# Patient Record
Sex: FEMALE | Race: BLACK OR AFRICAN AMERICAN | NOT HISPANIC OR LATINO
[De-identification: names, ages, dates, MRNs, and addresses within clinical notes are randomized per-mention and may not be internally consistent; named-entity substitution may affect disease eponyms.]

---

## 2017-01-19 ENCOUNTER — APPOINTMENT (OUTPATIENT)
Dept: GASTROENTEROLOGY | Facility: HOSPITAL | Age: 72
End: 2017-01-19

## 2017-01-24 ENCOUNTER — RESULT REVIEW (OUTPATIENT)
Age: 72
End: 2017-01-24

## 2017-02-16 ENCOUNTER — APPOINTMENT (OUTPATIENT)
Dept: GASTROENTEROLOGY | Facility: HOSPITAL | Age: 72
End: 2017-02-16

## 2018-04-20 ENCOUNTER — INPATIENT (INPATIENT)
Facility: HOSPITAL | Age: 73
LOS: 2 days | Discharge: HOME | DRG: 389 | End: 2018-04-23
Attending: SURGERY | Admitting: SURGERY
Payer: COMMERCIAL

## 2018-04-20 VITALS
RESPIRATION RATE: 18 BRPM | DIASTOLIC BLOOD PRESSURE: 69 MMHG | OXYGEN SATURATION: 96 % | HEIGHT: 62 IN | HEART RATE: 99 BPM | TEMPERATURE: 99 F | SYSTOLIC BLOOD PRESSURE: 110 MMHG | WEIGHT: 153 LBS

## 2018-04-20 DIAGNOSIS — K56.609 UNSPECIFIED INTESTINAL OBSTRUCTION, UNSPECIFIED AS TO PARTIAL VERSUS COMPLETE OBSTRUCTION: ICD-10-CM

## 2018-04-20 LAB
ALBUMIN SERPL ELPH-MCNC: 3.9 G/DL — SIGNIFICANT CHANGE UP (ref 3.5–5)
ALP SERPL-CCNC: 86 U/L — SIGNIFICANT CHANGE UP (ref 40–120)
ALT FLD-CCNC: 24 U/L DA — SIGNIFICANT CHANGE UP (ref 10–60)
ANION GAP SERPL CALC-SCNC: 10 MMOL/L — SIGNIFICANT CHANGE UP (ref 5–17)
APPEARANCE UR: CLEAR — SIGNIFICANT CHANGE UP
APTT BLD: 30.1 SEC — SIGNIFICANT CHANGE UP (ref 27.5–37.4)
AST SERPL-CCNC: 32 U/L — SIGNIFICANT CHANGE UP (ref 10–40)
BACTERIA # UR AUTO: ABNORMAL /HPF
BASOPHILS # BLD AUTO: 0.1 K/UL — SIGNIFICANT CHANGE UP (ref 0–0.2)
BASOPHILS NFR BLD AUTO: 1.9 % — SIGNIFICANT CHANGE UP (ref 0–2)
BILIRUB SERPL-MCNC: 0.6 MG/DL — SIGNIFICANT CHANGE UP (ref 0.2–1.2)
BILIRUB UR-MCNC: NEGATIVE — SIGNIFICANT CHANGE UP
BUN SERPL-MCNC: 37 MG/DL — HIGH (ref 7–18)
CALCIUM SERPL-MCNC: 8.9 MG/DL — SIGNIFICANT CHANGE UP (ref 8.4–10.5)
CHLORIDE SERPL-SCNC: 98 MMOL/L — SIGNIFICANT CHANGE UP (ref 96–108)
CK MB BLD-MCNC: 0.7 % — SIGNIFICANT CHANGE UP (ref 0–3.5)
CK MB CFR SERPL CALC: 1.7 NG/ML — SIGNIFICANT CHANGE UP (ref 0–3.6)
CK SERPL-CCNC: 253 U/L — HIGH (ref 21–215)
CO2 SERPL-SCNC: 29 MMOL/L — SIGNIFICANT CHANGE UP (ref 22–31)
COLOR SPEC: YELLOW — SIGNIFICANT CHANGE UP
CREAT SERPL-MCNC: 2.71 MG/DL — HIGH (ref 0.5–1.3)
DIFF PNL FLD: NEGATIVE — SIGNIFICANT CHANGE UP
EOSINOPHIL # BLD AUTO: 0.1 K/UL — SIGNIFICANT CHANGE UP (ref 0–0.5)
EOSINOPHIL NFR BLD AUTO: 0.9 % — SIGNIFICANT CHANGE UP (ref 0–6)
EPI CELLS # UR: SIGNIFICANT CHANGE UP /HPF
GLUCOSE SERPL-MCNC: 108 MG/DL — HIGH (ref 70–99)
GLUCOSE UR QL: NEGATIVE — SIGNIFICANT CHANGE UP
HCT VFR BLD CALC: 40.5 % — SIGNIFICANT CHANGE UP (ref 34.5–45)
HGB BLD-MCNC: 13.1 G/DL — SIGNIFICANT CHANGE UP (ref 11.5–15.5)
INR BLD: 1.05 RATIO — SIGNIFICANT CHANGE UP (ref 0.88–1.16)
KETONES UR-MCNC: ABNORMAL
LACTATE SERPL-SCNC: 0.7 MMOL/L — SIGNIFICANT CHANGE UP (ref 0.7–2)
LEUKOCYTE ESTERASE UR-ACNC: ABNORMAL
LIDOCAIN IGE QN: 177 U/L — SIGNIFICANT CHANGE UP (ref 73–393)
LYMPHOCYTES # BLD AUTO: 1.5 K/UL — SIGNIFICANT CHANGE UP (ref 1–3.3)
LYMPHOCYTES # BLD AUTO: 22.5 % — SIGNIFICANT CHANGE UP (ref 13–44)
MAGNESIUM SERPL-MCNC: 2.5 MG/DL — SIGNIFICANT CHANGE UP (ref 1.6–2.6)
MCHC RBC-ENTMCNC: 30.2 PG — SIGNIFICANT CHANGE UP (ref 27–34)
MCHC RBC-ENTMCNC: 32.4 GM/DL — SIGNIFICANT CHANGE UP (ref 32–36)
MCV RBC AUTO: 93.2 FL — SIGNIFICANT CHANGE UP (ref 80–100)
MONOCYTES # BLD AUTO: 1 K/UL — HIGH (ref 0–0.9)
MONOCYTES NFR BLD AUTO: 14.2 % — HIGH (ref 2–14)
NEUTROPHILS # BLD AUTO: 4.1 K/UL — SIGNIFICANT CHANGE UP (ref 1.8–7.4)
NEUTROPHILS NFR BLD AUTO: 60.6 % — SIGNIFICANT CHANGE UP (ref 43–77)
NITRITE UR-MCNC: NEGATIVE — SIGNIFICANT CHANGE UP
PH UR: 6 — SIGNIFICANT CHANGE UP (ref 5–8)
PLATELET # BLD AUTO: 254 K/UL — SIGNIFICANT CHANGE UP (ref 150–400)
POTASSIUM SERPL-MCNC: 4.4 MMOL/L — SIGNIFICANT CHANGE UP (ref 3.5–5.3)
POTASSIUM SERPL-SCNC: 4.4 MMOL/L — SIGNIFICANT CHANGE UP (ref 3.5–5.3)
PROT SERPL-MCNC: 8.5 G/DL — HIGH (ref 6–8.3)
PROT UR-MCNC: 15
PROTHROM AB SERPL-ACNC: 11.5 SEC — SIGNIFICANT CHANGE UP (ref 9.8–12.7)
RBC # BLD: 4.34 M/UL — SIGNIFICANT CHANGE UP (ref 3.8–5.2)
RBC # FLD: 13.4 % — SIGNIFICANT CHANGE UP (ref 10.3–14.5)
RBC CASTS # UR COMP ASSIST: SIGNIFICANT CHANGE UP /HPF (ref 0–2)
SODIUM SERPL-SCNC: 137 MMOL/L — SIGNIFICANT CHANGE UP (ref 135–145)
SP GR SPEC: 1.01 — SIGNIFICANT CHANGE UP (ref 1.01–1.02)
TROPONIN I SERPL-MCNC: <0.015 NG/ML — SIGNIFICANT CHANGE UP (ref 0–0.04)
UROBILINOGEN FLD QL: NEGATIVE — SIGNIFICANT CHANGE UP
WBC # BLD: 6.8 K/UL — SIGNIFICANT CHANGE UP (ref 3.8–10.5)
WBC # FLD AUTO: 6.8 K/UL — SIGNIFICANT CHANGE UP (ref 3.8–10.5)
WBC UR QL: SIGNIFICANT CHANGE UP /HPF (ref 0–5)

## 2018-04-20 PROCEDURE — 99222 1ST HOSP IP/OBS MODERATE 55: CPT | Mod: AI

## 2018-04-20 PROCEDURE — 71045 X-RAY EXAM CHEST 1 VIEW: CPT | Mod: 26

## 2018-04-20 PROCEDURE — 99285 EMERGENCY DEPT VISIT HI MDM: CPT

## 2018-04-20 PROCEDURE — 74176 CT ABD & PELVIS W/O CONTRAST: CPT | Mod: 26

## 2018-04-20 RX ORDER — FAMOTIDINE 10 MG/ML
20 INJECTION INTRAVENOUS ONCE
Qty: 0 | Refills: 0 | Status: COMPLETED | OUTPATIENT
Start: 2018-04-20 | End: 2018-04-20

## 2018-04-20 RX ORDER — FAMOTIDINE 10 MG/ML
20 INJECTION INTRAVENOUS ONCE
Qty: 0 | Refills: 0 | Status: DISCONTINUED | OUTPATIENT
Start: 2018-04-20 | End: 2018-04-20

## 2018-04-20 RX ORDER — DILTIAZEM HCL 120 MG
180 CAPSULE, EXT RELEASE 24 HR ORAL DAILY
Qty: 0 | Refills: 0 | Status: DISCONTINUED | OUTPATIENT
Start: 2018-04-20 | End: 2018-04-20

## 2018-04-20 RX ORDER — PANTOPRAZOLE SODIUM 20 MG/1
40 TABLET, DELAYED RELEASE ORAL DAILY
Qty: 0 | Refills: 0 | Status: DISCONTINUED | OUTPATIENT
Start: 2018-04-20 | End: 2018-04-23

## 2018-04-20 RX ORDER — INSULIN LISPRO 100/ML
VIAL (ML) SUBCUTANEOUS
Qty: 0 | Refills: 0 | Status: DISCONTINUED | OUTPATIENT
Start: 2018-04-20 | End: 2018-04-23

## 2018-04-20 RX ORDER — GLUCAGON INJECTION, SOLUTION 0.5 MG/.1ML
1 INJECTION, SOLUTION SUBCUTANEOUS ONCE
Qty: 0 | Refills: 0 | Status: DISCONTINUED | OUTPATIENT
Start: 2018-04-20 | End: 2018-04-23

## 2018-04-20 RX ORDER — HEPARIN SODIUM 5000 [USP'U]/ML
5000 INJECTION INTRAVENOUS; SUBCUTANEOUS EVERY 8 HOURS
Qty: 0 | Refills: 0 | Status: DISCONTINUED | OUTPATIENT
Start: 2018-04-20 | End: 2018-04-23

## 2018-04-20 RX ORDER — METOCLOPRAMIDE HCL 10 MG
10 TABLET ORAL ONCE
Qty: 0 | Refills: 0 | Status: DISCONTINUED | OUTPATIENT
Start: 2018-04-20 | End: 2018-04-20

## 2018-04-20 RX ORDER — CLOPIDOGREL BISULFATE 75 MG/1
75 TABLET, FILM COATED ORAL DAILY
Qty: 0 | Refills: 0 | Status: DISCONTINUED | OUTPATIENT
Start: 2018-04-20 | End: 2018-04-23

## 2018-04-20 RX ORDER — DEXTROSE 50 % IN WATER 50 %
25 SYRINGE (ML) INTRAVENOUS ONCE
Qty: 0 | Refills: 0 | Status: DISCONTINUED | OUTPATIENT
Start: 2018-04-20 | End: 2018-04-23

## 2018-04-20 RX ORDER — DEXTROSE 50 % IN WATER 50 %
1 SYRINGE (ML) INTRAVENOUS ONCE
Qty: 0 | Refills: 0 | Status: DISCONTINUED | OUTPATIENT
Start: 2018-04-20 | End: 2018-04-23

## 2018-04-20 RX ORDER — SODIUM CHLORIDE 9 MG/ML
1000 INJECTION, SOLUTION INTRAVENOUS
Qty: 0 | Refills: 0 | Status: DISCONTINUED | OUTPATIENT
Start: 2018-04-20 | End: 2018-04-23

## 2018-04-20 RX ORDER — SODIUM CHLORIDE 9 MG/ML
1000 INJECTION INTRAMUSCULAR; INTRAVENOUS; SUBCUTANEOUS
Qty: 0 | Refills: 0 | Status: DISCONTINUED | OUTPATIENT
Start: 2018-04-20 | End: 2018-04-21

## 2018-04-20 RX ORDER — DEXTROSE 50 % IN WATER 50 %
12.5 SYRINGE (ML) INTRAVENOUS ONCE
Qty: 0 | Refills: 0 | Status: DISCONTINUED | OUTPATIENT
Start: 2018-04-20 | End: 2018-04-23

## 2018-04-20 RX ORDER — ATORVASTATIN CALCIUM 80 MG/1
80 TABLET, FILM COATED ORAL AT BEDTIME
Qty: 0 | Refills: 0 | Status: DISCONTINUED | OUTPATIENT
Start: 2018-04-20 | End: 2018-04-20

## 2018-04-20 RX ORDER — ASPIRIN/CALCIUM CARB/MAGNESIUM 324 MG
81 TABLET ORAL DAILY
Qty: 0 | Refills: 0 | Status: DISCONTINUED | OUTPATIENT
Start: 2018-04-20 | End: 2018-04-23

## 2018-04-20 RX ORDER — MORPHINE SULFATE 50 MG/1
4 CAPSULE, EXTENDED RELEASE ORAL ONCE
Qty: 0 | Refills: 0 | Status: DISCONTINUED | OUTPATIENT
Start: 2018-04-20 | End: 2018-04-20

## 2018-04-20 RX ORDER — ONDANSETRON 8 MG/1
4 TABLET, FILM COATED ORAL ONCE
Qty: 0 | Refills: 0 | Status: COMPLETED | OUTPATIENT
Start: 2018-04-20 | End: 2018-04-20

## 2018-04-20 RX ORDER — METOCLOPRAMIDE HCL 10 MG
10 TABLET ORAL ONCE
Qty: 0 | Refills: 0 | Status: COMPLETED | OUTPATIENT
Start: 2018-04-20 | End: 2018-04-20

## 2018-04-20 RX ORDER — FLUOXETINE HCL 10 MG
40 CAPSULE ORAL DAILY
Qty: 0 | Refills: 0 | Status: DISCONTINUED | OUTPATIENT
Start: 2018-04-20 | End: 2018-04-23

## 2018-04-20 RX ORDER — SODIUM CHLORIDE 9 MG/ML
3 INJECTION INTRAMUSCULAR; INTRAVENOUS; SUBCUTANEOUS ONCE
Qty: 0 | Refills: 0 | Status: COMPLETED | OUTPATIENT
Start: 2018-04-20 | End: 2018-04-20

## 2018-04-20 RX ORDER — TRAZODONE HCL 50 MG
50 TABLET ORAL DAILY
Qty: 0 | Refills: 0 | Status: DISCONTINUED | OUTPATIENT
Start: 2018-04-20 | End: 2018-04-23

## 2018-04-20 RX ADMIN — Medication 10 MILLIGRAM(S): at 15:39

## 2018-04-20 RX ADMIN — MORPHINE SULFATE 4 MILLIGRAM(S): 50 CAPSULE, EXTENDED RELEASE ORAL at 15:40

## 2018-04-20 RX ADMIN — SODIUM CHLORIDE 125 MILLILITER(S): 9 INJECTION INTRAMUSCULAR; INTRAVENOUS; SUBCUTANEOUS at 12:16

## 2018-04-20 RX ADMIN — SODIUM CHLORIDE 125 MILLILITER(S): 9 INJECTION INTRAMUSCULAR; INTRAVENOUS; SUBCUTANEOUS at 22:47

## 2018-04-20 RX ADMIN — MORPHINE SULFATE 4 MILLIGRAM(S): 50 CAPSULE, EXTENDED RELEASE ORAL at 16:56

## 2018-04-20 RX ADMIN — SODIUM CHLORIDE 3 MILLILITER(S): 9 INJECTION INTRAMUSCULAR; INTRAVENOUS; SUBCUTANEOUS at 12:17

## 2018-04-20 RX ADMIN — FAMOTIDINE 104 MILLIGRAM(S): 10 INJECTION INTRAVENOUS at 12:17

## 2018-04-20 RX ADMIN — ONDANSETRON 4 MILLIGRAM(S): 8 TABLET, FILM COATED ORAL at 12:17

## 2018-04-20 RX ADMIN — HEPARIN SODIUM 5000 UNIT(S): 5000 INJECTION INTRAVENOUS; SUBCUTANEOUS at 22:47

## 2018-04-20 NOTE — H&P ADULT - ASSESSMENT
psbo with transition point LLQ, but with stool in colon and recent BM.  dm  htn  pud        Admit to surgical floors  ivf  i&o's  ngt to lcws  f/u axr in am  f/u labs am  GI/ dvt prophylaxis

## 2018-04-20 NOTE — H&P ADULT - HISTORY OF PRESENT ILLNESS
· HPI Objective Statement: 73 y/o F pt w/ PMhx of renal insuffiencey, DM, MI, PUD, anemia, depression, GERD, OA, HLD, diabetic neuropathy, coronary stent, CAD and PSHx of cataract extraction, stented coronary artery, bowel obstruction, hip replacement c/o emesis, abd pain, decreased PO intake x 5 days.Pt rates pain as 8/10 when present, but improved presently.. Last bowel movement today.  Pt reports a bowel obstruction in the remote past caused by a metal object lodged in the intra-abdomen and caused her to unable to defecate or urinate. Denies bloody stools, fever. Allergic to penicillin (rash).	  · Presenting Symptoms: DECREASED EATING/DRINKING, NAUSEA, VOMITING, cough	  · Negative Findings: no blood in stool, no fever	  · Location: abdomen	  · Timing: sudden onset	  · Duration: day(s)  5 days	    PAST MEDICAL/SURGICAL/FAMILY/SOCIAL HISTORY:    Past Medical History:  Anemia  denies txn, diagnosed 1 year ago with GI w/u showing a stable peptic ulcer  CAD   Coronary Stent  x 2 (1998, 2011)  Depression   Diabetes Mellitus Type II  x 14 years  Diabetic Nephropathy  was told she had "one weak kidney on ultrasound"  Diabetic Neuropathy    GERD (Gastroesophageal Reflux Disease)    MI in 2011  HTN    Hypercholesterolemia    OA (Osteoarthritis) of Knee  bilateral  PUD  Renal insufficiency    Sleep apnea, unspecified type.     Past Surgical History:  Bowel obstruction  surgical intervention  S/P cataract extraction    S/P hip replacement  right  Stented coronary artery.

## 2018-04-20 NOTE — ED PROVIDER NOTE - MEDICAL DECISION MAKING DETAILS
Pt w/ emesis and diffuse abd pain. Concern w total SOB or partial SBO. Will get CT scan and labs. Reassess.

## 2018-04-20 NOTE — H&P ADULT - ATTENDING COMMENTS
Agree w above  SBO, with plan to manage nonoperatively  Abd softly distended, nontender following NG placement  Medicine consult for management of multiple medical issues

## 2018-04-20 NOTE — H&P ADULT - NSHPPHYSICALEXAM_GEN_ALL_CORE
A&Ox3 nad  Vital Signs Last 24 Hrs  T(C): 37.2 (20 Apr 2018 15:21), Max: 37.2 (20 Apr 2018 15:21)  T(F): 99 (20 Apr 2018 15:21), Max: 99 (20 Apr 2018 15:21)  HR: 102 (20 Apr 2018 15:30) (98 - 102)  BP: 110/61 (20 Apr 2018 15:30) (101/71 - 110/69)  BP(mean): --  RR: 20 (20 Apr 2018 15:30) (18 - 20)  SpO2: 99% (20 Apr 2018 15:21) (96% - 99%)    Abd; soft, lower abdominal tenderness with deep palpation L>R  voluntary guarding, no rebound tenderness. no distinct masses present. no hernias palpable. healed midline scar.  lungs: cta bilat  EXT: nop calf swellng or erythema    ngt with 550cc bilious material, sumping well.

## 2018-04-20 NOTE — ED PROVIDER NOTE - PSH
Bowel obstruction  surgical intervention  S/P cataract extraction    S/P hip replacement  right  Stented coronary artery

## 2018-04-20 NOTE — H&P ADULT - NSHPLABSRESULTS_GEN_ALL_CORE
13.1   6.8   )-----------( 254      ( 20 Apr 2018 12:19 )             40.5     04-20    137  |  98  |  37<H>  ----------------------------<  108<H>  4.4   |  29  |  2.71<H>    Ca    8.9      20 Apr 2018 12:19  Mg     2.5     04-20    TPro  8.5<H>  /  Alb  3.9  /  TBili  0.6  /  DBili  x   /  AST  32  /  ALT  24  /  AlkPhos  86  04-20    < from: CT Abdomen and Pelvis w/ Oral Cont (04.20.18 @ 15:17) >    Impression:  Mechanical distal small bowel obstruction as described. Consider serial   plain films to monitor progression of contrast.  Cholelithiasis.  Diverticulosis coli, no diverticulitis.  Additional findings as discussed.    < end of copied text >

## 2018-04-20 NOTE — ED PROVIDER NOTE - OBJECTIVE STATEMENT
73 y/o F pt w/ PMhx of renal insuffiencey, DM, MI, PUD, anemia, depression, GERD, OA, HLD, diabetic neuropathy, coronary stent, CAD and PSHx of cataract extraction, stented coronary artery, bowel obstruction, hip replacement c/o emesis, abd pain, decreased PO intake x 5 days.  Rates pain as 8/10. Last bowel movement today. Pt reports a bowel obstruction in the remote past caused by a metal object lodged in the intra-abdomen and caused her to unable to defecate or urinate. Denies bloody stools, fever. Also reports productive cough x 2 weeks. Allergic to penicillin (rash).

## 2018-04-20 NOTE — ED PROVIDER NOTE - GASTROINTESTINAL, MLM
Abdomen soft, no guarding. Normal bowel sounds. Diffuse tenderness w/ distension. No CVA tenderness.

## 2018-04-20 NOTE — ED PROVIDER NOTE - PMH
Anemia  denies txn, diagnosed 1 year ago with GI w/u showing a stable peptic ulcer  CAD (Coronary Artery Disease)    Coronary Stent  x 2 (1998, 2011)  Depression  denies hospitalization/ therapy  Diabetes Mellitus Type II  x 14 years  Diabetic Nephropathy  was told she had "one weak kidney on ultrasound"  Diabetic Neuropathy    GERD (Gastroesophageal Reflux Disease)    Heart Attack  7/2011  HTN (Hypertension)    Hypercholesterolemia    OA (Osteoarthritis) of Knee  bilateral  PUD (Peptic Ulcer Disease)  denies bleed/ txn  Renal insufficiency    Sleep apnea, unspecified type

## 2018-04-21 DIAGNOSIS — K27.9 PEPTIC ULCER, SITE UNSPECIFIED, UNSPECIFIED AS ACUTE OR CHRONIC, WITHOUT HEMORRHAGE OR PERFORATION: ICD-10-CM

## 2018-04-21 DIAGNOSIS — K56.609 UNSPECIFIED INTESTINAL OBSTRUCTION, UNSPECIFIED AS TO PARTIAL VERSUS COMPLETE OBSTRUCTION: ICD-10-CM

## 2018-04-21 DIAGNOSIS — I10 ESSENTIAL (PRIMARY) HYPERTENSION: ICD-10-CM

## 2018-04-21 DIAGNOSIS — D64.9 ANEMIA, UNSPECIFIED: ICD-10-CM

## 2018-04-21 DIAGNOSIS — N28.9 DISORDER OF KIDNEY AND URETER, UNSPECIFIED: ICD-10-CM

## 2018-04-21 DIAGNOSIS — E11.40 TYPE 2 DIABETES MELLITUS WITH DIABETIC NEUROPATHY, UNSPECIFIED: ICD-10-CM

## 2018-04-21 DIAGNOSIS — I25.10 ATHEROSCLEROTIC HEART DISEASE OF NATIVE CORONARY ARTERY WITHOUT ANGINA PECTORIS: ICD-10-CM

## 2018-04-21 DIAGNOSIS — Z29.9 ENCOUNTER FOR PROPHYLACTIC MEASURES, UNSPECIFIED: ICD-10-CM

## 2018-04-21 LAB
ALBUMIN SERPL ELPH-MCNC: 3.1 G/DL — LOW (ref 3.5–5)
ALP SERPL-CCNC: 68 U/L — SIGNIFICANT CHANGE UP (ref 40–120)
ALT FLD-CCNC: 19 U/L DA — SIGNIFICANT CHANGE UP (ref 10–60)
ANION GAP SERPL CALC-SCNC: 9 MMOL/L — SIGNIFICANT CHANGE UP (ref 5–17)
AST SERPL-CCNC: 34 U/L — SIGNIFICANT CHANGE UP (ref 10–40)
BILIRUB SERPL-MCNC: 0.6 MG/DL — SIGNIFICANT CHANGE UP (ref 0.2–1.2)
BUN SERPL-MCNC: 24 MG/DL — HIGH (ref 7–18)
CALCIUM SERPL-MCNC: 7.8 MG/DL — LOW (ref 8.4–10.5)
CHLORIDE SERPL-SCNC: 110 MMOL/L — HIGH (ref 96–108)
CO2 SERPL-SCNC: 23 MMOL/L — SIGNIFICANT CHANGE UP (ref 22–31)
CREAT SERPL-MCNC: 1.48 MG/DL — HIGH (ref 0.5–1.3)
GLUCOSE SERPL-MCNC: 53 MG/DL — LOW (ref 70–99)
HBA1C BLD-MCNC: 10.9 % — HIGH (ref 4–5.6)
HCT VFR BLD CALC: 32.9 % — LOW (ref 34.5–45)
HCT VFR BLD CALC: 35.2 % — SIGNIFICANT CHANGE UP (ref 34.5–45)
HGB BLD-MCNC: 10.3 G/DL — LOW (ref 11.5–15.5)
HGB BLD-MCNC: 10.8 G/DL — LOW (ref 11.5–15.5)
LACTATE SERPL-SCNC: 1.3 MMOL/L — SIGNIFICANT CHANGE UP (ref 0.7–2)
MCHC RBC-ENTMCNC: 29.1 PG — SIGNIFICANT CHANGE UP (ref 27–34)
MCHC RBC-ENTMCNC: 29.9 PG — SIGNIFICANT CHANGE UP (ref 27–34)
MCHC RBC-ENTMCNC: 30.7 GM/DL — LOW (ref 32–36)
MCHC RBC-ENTMCNC: 31.3 GM/DL — LOW (ref 32–36)
MCV RBC AUTO: 94.8 FL — SIGNIFICANT CHANGE UP (ref 80–100)
MCV RBC AUTO: 95.4 FL — SIGNIFICANT CHANGE UP (ref 80–100)
PLATELET # BLD AUTO: 212 K/UL — SIGNIFICANT CHANGE UP (ref 150–400)
PLATELET # BLD AUTO: 219 K/UL — SIGNIFICANT CHANGE UP (ref 150–400)
POTASSIUM SERPL-MCNC: 4.1 MMOL/L — SIGNIFICANT CHANGE UP (ref 3.5–5.3)
POTASSIUM SERPL-SCNC: 4.1 MMOL/L — SIGNIFICANT CHANGE UP (ref 3.5–5.3)
PROT SERPL-MCNC: 6.9 G/DL — SIGNIFICANT CHANGE UP (ref 6–8.3)
RBC # BLD: 3.45 M/UL — LOW (ref 3.8–5.2)
RBC # BLD: 3.71 M/UL — LOW (ref 3.8–5.2)
RBC # FLD: 13.7 % — SIGNIFICANT CHANGE UP (ref 10.3–14.5)
RBC # FLD: 14.1 % — SIGNIFICANT CHANGE UP (ref 10.3–14.5)
SODIUM SERPL-SCNC: 142 MMOL/L — SIGNIFICANT CHANGE UP (ref 135–145)
WBC # BLD: 5.2 K/UL — SIGNIFICANT CHANGE UP (ref 3.8–10.5)
WBC # BLD: 5.6 K/UL — SIGNIFICANT CHANGE UP (ref 3.8–10.5)
WBC # FLD AUTO: 5.2 K/UL — SIGNIFICANT CHANGE UP (ref 3.8–10.5)
WBC # FLD AUTO: 5.6 K/UL — SIGNIFICANT CHANGE UP (ref 3.8–10.5)

## 2018-04-21 PROCEDURE — 99231 SBSQ HOSP IP/OBS SF/LOW 25: CPT

## 2018-04-21 PROCEDURE — 74018 RADEX ABDOMEN 1 VIEW: CPT | Mod: 26

## 2018-04-21 RX ORDER — CIPROFLOXACIN LACTATE 400MG/40ML
VIAL (ML) INTRAVENOUS
Qty: 0 | Refills: 0 | Status: DISCONTINUED | OUTPATIENT
Start: 2018-04-22 | End: 2018-04-23

## 2018-04-21 RX ORDER — DEXTROSE 50 % IN WATER 50 %
25 SYRINGE (ML) INTRAVENOUS ONCE
Qty: 0 | Refills: 0 | Status: COMPLETED | OUTPATIENT
Start: 2018-04-21 | End: 2018-04-21

## 2018-04-21 RX ORDER — ACETAMINOPHEN 500 MG
1000 TABLET ORAL ONCE
Qty: 0 | Refills: 0 | Status: COMPLETED | OUTPATIENT
Start: 2018-04-21 | End: 2018-04-21

## 2018-04-21 RX ORDER — CIPROFLOXACIN LACTATE 400MG/40ML
400 VIAL (ML) INTRAVENOUS ONCE
Qty: 0 | Refills: 0 | Status: COMPLETED | OUTPATIENT
Start: 2018-04-21 | End: 2018-04-22

## 2018-04-21 RX ORDER — ACETAMINOPHEN 500 MG
650 TABLET ORAL EVERY 6 HOURS
Qty: 0 | Refills: 0 | Status: DISCONTINUED | OUTPATIENT
Start: 2018-04-21 | End: 2018-04-23

## 2018-04-21 RX ORDER — SODIUM CHLORIDE 9 MG/ML
1000 INJECTION, SOLUTION INTRAVENOUS
Qty: 0 | Refills: 0 | Status: COMPLETED | OUTPATIENT
Start: 2018-04-21 | End: 2018-04-21

## 2018-04-21 RX ORDER — SODIUM CHLORIDE 9 MG/ML
1000 INJECTION, SOLUTION INTRAVENOUS
Qty: 0 | Refills: 0 | Status: DISCONTINUED | OUTPATIENT
Start: 2018-04-21 | End: 2018-04-23

## 2018-04-21 RX ORDER — CIPROFLOXACIN LACTATE 400MG/40ML
400 VIAL (ML) INTRAVENOUS EVERY 12 HOURS
Qty: 0 | Refills: 0 | Status: DISCONTINUED | OUTPATIENT
Start: 2018-04-22 | End: 2018-04-23

## 2018-04-21 RX ADMIN — SODIUM CHLORIDE 50 MILLILITER(S): 9 INJECTION, SOLUTION INTRAVENOUS at 08:39

## 2018-04-21 RX ADMIN — PANTOPRAZOLE SODIUM 40 MILLIGRAM(S): 20 TABLET, DELAYED RELEASE ORAL at 12:44

## 2018-04-21 RX ADMIN — SODIUM CHLORIDE 100 MILLILITER(S): 9 INJECTION, SOLUTION INTRAVENOUS at 22:37

## 2018-04-21 RX ADMIN — Medication 25 GRAM(S): at 08:39

## 2018-04-21 RX ADMIN — Medication 15 MILLIGRAM(S): at 21:25

## 2018-04-21 RX ADMIN — SODIUM CHLORIDE 50 MILLILITER(S): 9 INJECTION, SOLUTION INTRAVENOUS at 10:26

## 2018-04-21 RX ADMIN — SODIUM CHLORIDE 125 MILLILITER(S): 9 INJECTION INTRAMUSCULAR; INTRAVENOUS; SUBCUTANEOUS at 06:03

## 2018-04-21 RX ADMIN — HEPARIN SODIUM 5000 UNIT(S): 5000 INJECTION INTRAVENOUS; SUBCUTANEOUS at 21:25

## 2018-04-21 RX ADMIN — HEPARIN SODIUM 5000 UNIT(S): 5000 INJECTION INTRAVENOUS; SUBCUTANEOUS at 14:15

## 2018-04-21 RX ADMIN — Medication 15 MILLIGRAM(S): at 14:15

## 2018-04-21 RX ADMIN — Medication 650 MILLIGRAM(S): at 14:15

## 2018-04-21 RX ADMIN — HEPARIN SODIUM 5000 UNIT(S): 5000 INJECTION INTRAVENOUS; SUBCUTANEOUS at 06:00

## 2018-04-21 RX ADMIN — Medication 400 MILLIGRAM(S): at 00:35

## 2018-04-21 RX ADMIN — Medication 1000 MILLIGRAM(S): at 01:13

## 2018-04-21 NOTE — PROGRESS NOTE ADULT - ASSESSMENT
72 female admitted with SBO that is improving with medical management  - F/u AM labs and AXR  - Likely to discontinue NGT today  - DVT prophylaxis  - Ambulate 72 female admitted with SBO that is improving with medical management  - F/u AM labs and AXR  -  NGT to suction  -  DVT prophylaxis  - Ambulate

## 2018-04-21 NOTE — CONSULT NOTE ADULT - PROBLEM SELECTOR RECOMMENDATION 8
- Improve VTE score:  [] Previous VTE                                                3  [] Thrombophilia                                             2  [] Lower limb paralysis                                   2    [] Current Cancer                                             2   [x] Immobilization > 24 hrs                              1  [] ICU/CCU stay > 24 hours                             1  [x] Age > 60                                                         1    continue lovenox for DVT and protonix for GI ppx - Improve VTE score:  [] Previous VTE                                                3  [] Thrombophilia                                             2  [] Lower limb paralysis                                   2    [] Current Cancer                                             2   [x] Immobilization > 24 hrs                              1  [] ICU/CCU stay > 24 hours                             1  [x] Age > 60                                                         1    continue sc heparin for DVT and protonix for GI ppx

## 2018-04-21 NOTE — PROGRESS NOTE ADULT - SUBJECTIVE AND OBJECTIVE BOX
Patient seen and examined at bedside. Patient states she is feeling well. Denies any abdominal pain, nausea or vomiting. Had a bowel movement yesterday and is passing flatus. Remains afebrile and hemodynamically stable. No acute events overnight.    Vital Signs Last 24 Hrs  T(C): 37.3 (2018 06:58), Max: 37.7 (2018 00:02)  T(F): 99.2 (2018 06:58), Max: 99.8 (2018 00:02)  HR: 93 (2018 06:58) (92 - 106)  BP: 103/58 (2018 06:58) (91/59 - 117/64)  BP(mean): --  RR: 18 (2018 06:58) (18 - 20)  SpO2: 96% (2018 06:58) (94% - 99%)      Pain: [ ] YES [X ] NO  Pain (0-10):              Pain Control Adequate: [X ] YES [ ] NO  SOB: [ ]YES [X ] NO  Chest Discomfort: [ ] YES [X ] NO    Nausea: [ ] YES [ X] NO           Vomiting: [ ] YES [X ] NO  Flatus: [X ] YES [ ] NO             Bowel Movement: [X ] YES [ ] NO     Void: [X ]YES [ ]No      NGT: minimal    Physical exam  General Appearance: Appears well, NAD  Neck: Supple  Chest: Equal expansion bilaterally, equal breath sounds  CV: Pulse regular presently  Abdomen: Soft, NT, ND, + BD  Extremities: Grossly symmetric      MEDICATIONS  (STANDING):  aspirin enteric coated 81 milliGRAM(s) Oral daily  busPIRone 15 milliGRAM(s) Oral three times a day  clopidogrel Tablet 75 milliGRAM(s) Oral daily  dextrose 5%. 1000 milliLiter(s) (50 mL/Hr) IV Continuous <Continuous>  dextrose 50% Injectable 12.5 Gram(s) IV Push once  dextrose 50% Injectable 25 Gram(s) IV Push once  dextrose 50% Injectable 25 Gram(s) IV Push once  FLUoxetine 40 milliGRAM(s) Oral daily  heparin  Injectable 5000 Unit(s) SubCutaneous every 8 hours  insulin lispro (HumaLOG) corrective regimen sliding scale   SubCutaneous three times a day before meals  pantoprazole  Injectable 40 milliGRAM(s) IV Push daily  sodium chloride 0.9%. 1000 milliLiter(s) (125 mL/Hr) IV Continuous <Continuous>  traZODone 50 milliGRAM(s) Oral daily    MEDICATIONS  (PRN):  dextrose Gel 1 Dose(s) Oral once PRN Blood Glucose LESS THAN 70 milliGRAM(s)/deciliter  glucagon  Injectable 1 milliGRAM(s) IntraMuscular once PRN Glucose LESS THAN 70 milligrams/deciliter      LABS:                        13.1   6.8   )-----------( 254      ( 2018 12:19 )             40.5     04-    137  |  98  |  37<H>  ----------------------------<  108<H>  4.4   |  29  |  2.71<H>    Ca    8.9      2018 12:19  Mg     2.5         TPro  8.5<H>  /  Alb  3.9  /  TBili  0.6  /  DBili  x   /  AST  32  /  ALT  24  /  AlkPhos  86  -20    PT/INR - ( 2018 12:19 )   PT: 11.5 sec;   INR: 1.05 ratio         PTT - ( 2018 12:19 )  PTT:30.1 sec  Urinalysis Basic - ( 2018 17:37 )    Color: Yellow / Appearance: Clear / S.015 / pH: x  Gluc: x / Ketone: Trace  / Bili: Negative / Urobili: Negative   Blood: x / Protein: 15 / Nitrite: Negative   Leuk Esterase: Trace / RBC: 0-2 /HPF / WBC 3-5 /HPF   Sq Epi: x / Non Sq Epi: Few /HPF / Bacteria: Few /HPF        RADIOLOGY & ADDITIONAL STUDIES:

## 2018-04-21 NOTE — CONSULT NOTE ADULT - ASSESSMENT
Patient is a 72 year old female from home medical history significant for bowel obstruction, recent cardiac catheterisation that showed 3 patent stents (8/23/16), remote history of MI (1983),  HTN, HLD, DM, renal insufficieny, anemia and GERD/PUD. She has been admitted to [facility] earlier in 2016 for SOB when she required cardiac cath. During the same time patient had ECHO and NST which were negative for acute disease. This time patient presents with emesis, abd pain and decreased PO intake found to have SBO, admitted to surgery service. Patient improved significantly with NGT and did not require surgery. Medicine consulted for medical management of SBO. Patient is a 72 year old female from home medical history significant for bowel obstruction, cardiac catheterisation that showed 3 patent stents (8/23/16), remote history of MI (1983),  HTN, HLD, DM, renal insufficieny, anemia and GERD/PUD. She has been admitted to [facility] earlier in 2016 for SOB when she required cardiac cath. During the same time patient had ECHO and NST which were negative for acute disease. This time patient presents with emesis, abd pain and decreased PO intake found to have SBO, admitted to surgery service. Patient improved significantly with NGT and did not require surgery. Medicine consulted for medical management of SBO.   Patient states that she is 'miserable with the NGT' that is causing headache but otherwise is comfortable. She had mild epigastric pain that self resolved. Throbbing pain spontaneous in origin that she usually gets. No tightness, diaphoresis, palpitations or jaw/shoulder pain.  Patient able to pas gas. No abdominal pain, nausea, vomiting or urinary complaints. Patient reports right hip replacement for pain that now she is experiencing in the left hip. She wouldn't want another hip replacement hence asked for pain medications.   Former smoker, quit 2 years ago.   Lives with son but doesn't have help at home. Says she often eats at outside and gets indigestion. Would want a HHA or assistance.

## 2018-04-21 NOTE — PATIENT PROFILE ADULT. - PRO PAIN LIFE ADAPT
inability or reluctance to perform ADLs/inability to enjoy life/inability to sleep/decreased appetite

## 2018-04-21 NOTE — CONSULT NOTE ADULT - PROBLEM SELECTOR RECOMMENDATION 3
- if patient is cleared to swallow pills please resume high intensity statin (patient was on atorvastatin 80 OD). Continue aspirin and plavix.

## 2018-04-21 NOTE — CONSULT NOTE ADULT - ATTENDING COMMENTS
Agree with all the above   patient seen and examined at bedside. she is a 72 year old female from home with PMHs of CAD s/p 3 stents, remote history of MI (1983),  HTN, HLD, DM, renal insufficieny, anemia and GERD/PUD. presented with emesis, abdominal  pain and decreased PO intake. Found to have SBO.  Medical consul was called for management of other comorbidities.   During examination patient is comfortably laying in bed  with NG tube, only had complains of headache.   Labs and VS reviewed     ROS and PE as above  Vital Signs Last 24 Hrs  T(C): 37.3 (21 Apr 2018 06:58), Max: 37.7 (21 Apr 2018 00:02)  T(F): 99.2 (21 Apr 2018 06:58), Max: 99.8 (21 Apr 2018 00:02)  HR: 93 (21 Apr 2018 06:58) (92 - 106)  BP: 103/58 (21 Apr 2018 06:58) (91/59 - 117/64)  BP(mean): --  RR: 18 (21 Apr 2018 06:58) (18 - 20)  SpO2: 96% (21 Apr 2018 06:58) (94% - 99%)    1. SBO: management as per surgical team  2. DENIS on CKD: improving on IV fluid   Avoid nephrotoxic drugs     3. Uncontrolled DM: HbA1c 10.6  On Lantus and Humalog at home   Since patient currently NPO and had an episode of hypoglycemia, hold Lantus for now   C/w NS and D5% and Humalog per sliding scale.   4. CAD: Aspirin and plavix on hold     The rest as above   Will follow

## 2018-04-21 NOTE — CONSULT NOTE ADULT - PROBLEM SELECTOR RECOMMENDATION 2
- patient had DENIS on CKD on admission   - Cr baseline is 1.4  - BUN/Cr ratio <20, renal vs post-renal etiology though cannot rule out pre-renal since urine studies not done yet to calculate FeNa.   - Cr is trending down  - specific gravity of urine is high normal, mild dehydration  likely from SBO   - recommend gentle hydration if patient unable to tolerate po diet. - patient had DENIS on CKD on admission   - Cr baseline is 1.4  - BUN/Cr ratio <20, renal vs post-renal etiology though cannot rule out pre-renal since urine studies not done yet to calculate FeNa.   - Cr is trending down  - specific gravity of urine is high normal, mild dehydration  likely from SBO   - recommend to continue iv hydration if patient unable to tolerate po diet.

## 2018-04-21 NOTE — PATIENT PROFILE ADULT. - VISION (WITH CORRECTIVE LENSES IF THE PATIENT USUALLY WEARS THEM):
B/L Cataract Sx 2013/Normal vision: sees adequately in most situations; can see medication labels, newsprint

## 2018-04-21 NOTE — CONSULT NOTE ADULT - PROBLEM SELECTOR RECOMMENDATION 9
- secondary to adhesions from previous surgeries (patient had bowel obstruction requiring surgical intervention in ---)  - Per surgery may remove NGT   - patient passing flatus and having BMs  - may start diet   - close monitoring for signs of perforation   - patient is tachycardic and there is mild elevation of temperature. Lactate is normal. Please send blood cultures if patient spikes fever >100.3.   - eventual colonoscopy to r/o other etiologies including IBD, colonic diverticulosis or ileus.   - GI evaluation  - serial abdominal X rays - secondary to adhesions from previous surgeries (patient had bowel obstruction requiring surgical intervention   - Per surgery may remove NGT later today or tomorrow; resume diet per surgery   - patient passing flatus and having BMs  - close monitoring for signs of perforation   - patient is tachycardic and there is mild elevation of temperature. Lactate is normal. Please send blood cultures if patient spikes fever >100.3. X ray of the sinus since patient had NGT and now reports headache.   - eventual colonoscopy to r/o other etiologies including IBD, colonic diverticulosis or ileus.   - serial abdominal X rays  - continue Iv fluids, monitor for electrolyte imbalances, Finger sticks and replace as indicated   - consider GI evaluation

## 2018-04-21 NOTE — CONSULT NOTE ADULT - SUBJECTIVE AND OBJECTIVE BOX
Patient is a 72 year old female from home medical history significant for bowel obstruction, recent cardiac catheterisation that showed 3 patent stents (16), remote history of MI (),  HTN, HLD, DM, renal insufficieny, anemia and GERD/PUD. She has been admitted to [facility] earlier in 2016 for SOB when she required cardiac cath. During the same time patient had ECHO and NST which were negative for acute disease. This time patient presents with emesis, abd pain and decreased PO intake found to have SBO, admitted to surgery service. Patient improved significantly with NGT and did not require surgery. Medicine consulted for medical management of SBO.         PMH/ PSH: Sleep apnea, unspecified type  Renal insufficiency  Heart Attack  PUD (Peptic Ulcer Disease)  Depression  Pudendal Ulcer  Anemia  GERD (Gastroesophageal Reflux Disease)  OA (Osteoarthritis) of Knee  Hypercholesterolemia  Diabetic Nephropathy  Diabetic Neuropathy  Diabetes Mellitus Type II  HTN (Hypertension)  Coronary Stent  CAD (Coronary Artery Disease)  S/P cataract extraction  Stented coronary artery  Bowel obstruction  S/P hip replacement  ,   Allergy: penicillin (Swelling)  ,   Social history: ,   Family History: Family history of diabetes mellitus  Family history of essential hypertension    Anesthesia reaction (prior) -    ECHO/ stress test: .   Colonoscopy/EGD- .   Code status:     Meds at home:  Meds in hospital:  aspirin enteric coated 81 milliGRAM(s) Oral daily  busPIRone 15 milliGRAM(s) Oral three times a day  clopidogrel Tablet 75 milliGRAM(s) Oral daily  dextrose 5%. 1000 milliLiter(s) IV Continuous <Continuous>  dextrose 5%. 1000 milliLiter(s) IV Continuous <Continuous>  dextrose 50% Injectable 12.5 Gram(s) IV Push once  dextrose 50% Injectable 25 Gram(s) IV Push once  dextrose 50% Injectable 25 Gram(s) IV Push once  dextrose Gel 1 Dose(s) Oral once PRN  FLUoxetine 40 milliGRAM(s) Oral daily  glucagon  Injectable 1 milliGRAM(s) IntraMuscular once PRN  heparin  Injectable 5000 Unit(s) SubCutaneous every 8 hours  insulin lispro (HumaLOG) corrective regimen sliding scale   SubCutaneous three times a day before meals  pantoprazole  Injectable 40 milliGRAM(s) IV Push daily  sodium chloride 0.9%. 1000 milliLiter(s) IV Continuous <Continuous>  traZODone 50 milliGRAM(s) Oral daily      VITALS:  T(F): 99.2 (18 @ 06:58), Max: 99.8 (18 @ 00:02)  HR: 93 (18 @ 06:58)  BP: 103/58 (18 @ 06:58)  RR: 18 (18 @ 06:58)  SpO2: 96% (18 @ 06:58)  Wt(kg): --    Height (cm): 157.48 ( @ 10:45)  Weight (kg): 69.4 ( @ 10:45)  BMI (kg/m2): 28 ( @ 10:45)  BSA (m2): 1.71 ( @ 10:45)    LABS:                        10.3   5.2   )-----------( 212      ( 2018 07:31 )             32.9         142  |  110<H>  |  24<H>  ----------------------------<  53<L>  4.1   |  23  |  1.48<H>    Ca    7.8<L>      2018 07:31  Mg     2.5         TPro  6.9  /  Alb  3.1<L>  /  TBili  0.6  /  DBili  x   /  AST  34  /  ALT  19  /  AlkPhos  68      PT/INR - ( 2018 12:19 )   PT: 11.5 sec;   INR: 1.05 ratio         PTT - ( 2018 12:19 )  PTT:30.1 sec  Lactate, Blood: 1.3 mmol/L ( @ 07:31)  Lactate, Blood: 0.7 mmol/L ( @ 20:07)  Lipase, Serum: 177 U/L ( @ 12:19)  Creatine Kinase, Serum: 253 U/L ( @ 12:19)    CAPILLARY BLOOD GLUCOSE    Urinalysis Basic - ( 2018 17:37 )    Color: Yellow / Appearance: Clear / S.015 / pH: x  Gluc: x / Ketone: Trace  / Bili: Negative / Urobili: Negative   Blood: x / Protein: 15 / Nitrite: Negative   Leuk Esterase: Trace / RBC: 0-2 /HPF / WBC 3-5 /HPF   Sq Epi: x / Non Sq Epi: Few /HPF / Bacteria: Few /HPF          REVIEW OF SYSTEMS:  CONSTITUTIONAL: No fever, weight loss, or fatigue  EYES: No eye pain, visual disturbances, or discharge  ENMT:  No difficulty hearing, tinnitus, vertigo; No sinus or throat pain  NECK: No pain or stiffness  BREASTS: No pain, masses, or nipple discharge  RESPIRATORY: No cough, wheezing, chills or hemoptysis; No shortness of breath  CARDIOVASCULAR: No chest pain, palpitations, dizziness, or leg swelling  GASTROINTESTINAL: No abdominal or epigastric pain. No nausea, vomiting, or hematemesis; No diarrhea or constipation. No melena or hematochezia.  GENITOURINARY: No dysuria, frequency, hematuria, or incontinence  NEUROLOGICAL: No headaches, memory loss, loss of strength, numbness, or tremors  SKIN: No itching, burning, rashes, or lesions   LYMPH NODES: No enlarged glands  ENDOCRINE: No heat or cold intolerance; No hair loss  MUSCULOSKELETAL: No joint pain or swelling; No muscle, back, or extremity pain  PSYCHIATRIC: No depression, anxiety, mood swings, or difficulty sleeping  HEME/LYMPH: No easy bruising, or bleeding gums  ALLERY AND IMMUNOLOGIC: No hives or eczema    RADIOLOGY & ADDITIONAL TESTS:    1. CXR:   2. USG liver:  3. CT abdomen:     Imaging Personally Reviewed:  [+ ] YES  [ ] NO    Consultant(s) Notes Reviewed:  [+ ] YES  [ ] NO    PHYSICAL EXAM:  GENERAL: NAD, well-groomed, well-developed  HEAD:  Atraumatic, Normocephalic  EYES: EOMI, PERRLA, conjunctiva and sclera clear  ENMT: No tonsillar erythema, exudates, or enlargement; Moist mucous membranes, Good dentition, No lesions  NECK: Supple, No JVD, Normal thyroid  NERVOUS SYSTEM:  Alert & Oriented X3, Good concentration; Motor Strength 5/5 B/L upper and lower extremities; DTRs 2+ intact and symmetric  CHEST/LUNG: Clear to percussion bilaterally; No rales, rhonchi, wheezing, or rubs  HEART: Regular rate and rhythm; No murmurs, rubs, or gallops  ABDOMEN: Soft, Nontender, Nondistended; Bowel sounds present  EXTREMITIES:  2+ Peripheral Pulses, No clubbing, cyanosis, or edema  LYMPH: No lymphadenopathy noted  SKIN: No rashes or lesions    Care Discussed with Consultants/Other Providers [ ] YES  [ ] NO Patient is a 72 year old pleasant lady, from home medical history significant for bowel obstruction sec to "a little metal object that got loose", requiring surgery about 5 yeara ago, recent cardiac catheterisation that showed 3 patent stents (16), remote history of MI (),  HTN, HLD, DM, renal insufficieny, anemia and GERD/PUD. She has been admitted to [facility] earlier in 2016 for SOB when she required cardiac cath. During the same time patient had ECHO and NST which were negative for acute disease. This time patient presents with emesis, abd pain and decreased PO intake found to have SBO, admitted to surgery service. Patient improved significantly with NGT and did not require surgery. Medicine consulted for medical management of SBO.         PMH/ PSH: Sleep apnea, unspecified type  Renal insufficiency  Heart Attack  PUD (Peptic Ulcer Disease)  Depression  Pudendal Ulcer  Anemia  GERD (Gastroesophageal Reflux Disease)  OA (Osteoarthritis) of Knee  Hypercholesterolemia  Diabetic Nephropathy  Diabetic Neuropathy  Diabetes Mellitus Type II  HTN (Hypertension)  Coronary Stent  CAD (Coronary Artery Disease)  S/P cataract extraction  Stented coronary artery  Bowel obstruction  S/P hip replacement  ,   Allergy: penicillin (Swelling)  ,   Social history: ,   Family History: Family history of diabetes mellitus  Family history of essential hypertension    Anesthesia reaction (prior) -    ECHO/ stress test: .   Colonoscopy/EGD- .   Code status:     Meds at home:  Meds in hospital:  aspirin enteric coated 81 milliGRAM(s) Oral daily  busPIRone 15 milliGRAM(s) Oral three times a day  clopidogrel Tablet 75 milliGRAM(s) Oral daily  dextrose 5%. 1000 milliLiter(s) IV Continuous <Continuous>  dextrose 5%. 1000 milliLiter(s) IV Continuous <Continuous>  dextrose 50% Injectable 12.5 Gram(s) IV Push once  dextrose 50% Injectable 25 Gram(s) IV Push once  dextrose 50% Injectable 25 Gram(s) IV Push once  dextrose Gel 1 Dose(s) Oral once PRN  FLUoxetine 40 milliGRAM(s) Oral daily  glucagon  Injectable 1 milliGRAM(s) IntraMuscular once PRN  heparin  Injectable 5000 Unit(s) SubCutaneous every 8 hours  insulin lispro (HumaLOG) corrective regimen sliding scale   SubCutaneous three times a day before meals  pantoprazole  Injectable 40 milliGRAM(s) IV Push daily  sodium chloride 0.9%. 1000 milliLiter(s) IV Continuous <Continuous>  traZODone 50 milliGRAM(s) Oral daily      VITALS:  T(F): 99.2 (18 @ 06:58), Max: 99.8 (18 @ 00:02)  HR: 93 (18 @ 06:58)  BP: 103/58 (18 @ 06:58)  RR: 18 (18 @ 06:58)  SpO2: 96% (18 @ 06:58)  Wt(kg): --    Height (cm): 157.48 ( @ 10:45)  Weight (kg): 69.4 ( @ 10:45)  BMI (kg/m2): 28 ( @ 10:45)  BSA (m2): 1.71 ( @ 10:45)    LABS:                        10.3   5.2   )-----------( 212      ( 2018 07:31 )             32.9         142  |  110<H>  |  24<H>  ----------------------------<  53<L>  4.1   |  23  |  1.48<H>    Ca    7.8<L>      2018 07:31  Mg     2.5         TPro  6.9  /  Alb  3.1<L>  /  TBili  0.6  /  DBili  x   /  AST  34  /  ALT  19  /  AlkPhos  68      PT/INR - ( 2018 12:19 )   PT: 11.5 sec;   INR: 1.05 ratio         PTT - ( 2018 12:19 )  PTT:30.1 sec  Lactate, Blood: 1.3 mmol/L ( @ 07:31)  Lactate, Blood: 0.7 mmol/L ( @ 20:07)  Lipase, Serum: 177 U/L ( @ 12:19)  Creatine Kinase, Serum: 253 U/L ( @ 12:19)    CAPILLARY BLOOD GLUCOSE    Urinalysis Basic - ( 2018 17:37 )    Color: Yellow / Appearance: Clear / S.015 / pH: x  Gluc: x / Ketone: Trace  / Bili: Negative / Urobili: Negative   Blood: x / Protein: 15 / Nitrite: Negative   Leuk Esterase: Trace / RBC: 0-2 /HPF / WBC 3-5 /HPF   Sq Epi: x / Non Sq Epi: Few /HPF / Bacteria: Few /HPF          REVIEW OF SYSTEMS:  CONSTITUTIONAL: patient has headache   EYES: No eye pain, visual disturbances, or discharge  ENMT:  discomfort from NGT   NECK: No pain or stiffness  RESPIRATORY: No cough, wheezing, chills or hemoptysis; No shortness of breath  CARDIOVASCULAR: epigastric chest pain yesterday.  No palpitations, dizziness, or leg swelling  GASTROINTESTINAL: + occasional epigastric pain. No nausea, vomiting, or hematemesis;   GENITOURINARY: No dysuria, frequency, hematuria, or incontinence  NEUROLOGICAL: + headaches, no memory loss, loss of strength, numbness, or tremors  SKIN: No itching, burning, rashes, or lesions   LYMPH NODES: No enlarged glands  ENDOCRINE: No heat or cold intolerance; No hair loss  MUSCULOSKELETAL: + Left hip pain  PSYCHIATRIC: + anxiety, no mood swings, or difficulty sleeping  HEME/LYMPH: No easy bruising, or bleeding gums  ALLERY AND IMMUNOLOGIC: No hives or eczema    RADIOLOGY & ADDITIONAL TESTS:    1. CXR: No active pleural-parenchymal process. Stable exam.    2. CT abdomen:  Mechanical distal small bowel obstruction as described. Consider serial   plain films to monitor progression of contrast.  Cholelithiasis.  Diverticulosis coli, no diverticulitis.      Imaging Personally Reviewed:  [+ ] YES  [ ] NO    Consultant(s) Notes Reviewed:  [+ ] YES  [ ] NO    PHYSICAL EXAM:  GENERAL: NAD, well-groomed, well-developed  HEAD:  Atraumatic, Normocephalic  EYES: PERRL, conjunctiva and sclera clear  ENMT: Patient has NGT with secretions. No tonsillar erythema, exudates, or enlargement; Moist mucous membranes,   NECK: Supple, No JVD, Normal thyroid  NERVOUS SYSTEM:  Alert & Oriented X3, Good concentration;   CHEST/LUNG: Clear to percussion bilaterally; No rales, rhonchi, wheezing, or rubs  HEART: +tachycardia.  Regular rhythm; No murmurs, rubs, or gallops  ABDOMEN: Soft, Nontender, Nondistended; Feeble Bowel sounds present x 4   EXTREMITIES:  2+ Peripheral Pulses, No clubbing, cyanosis, or edema  LYMPH: No lymphadenopathy noted  SKIN: No rashes or lesions    Care Discussed with Consultants/Other Providers [ ] YES  [ ] NO

## 2018-04-21 NOTE — CONSULT NOTE ADULT - PROBLEM SELECTOR RECOMMENDATION 6
- patient on glimepiride at home had hypoglycemia today with FS of 54 only   - dextrose push x 1 ordered, continue HSS for now   - while patient is NPO please continue d5+NS, add K in the fluids if low   - A1c is testing - patient on glimepiride at home had hypoglycemia today with FS of 54 only   - dextrose push x 1 ordered, continue HSS for now   - while patient is NPO please continue d5+NS, add K in the fluids if low   - A1c is 10.9 - 2/2 poorly controlled DM with A1c of 10.9   - patient on glimepiride at home had hypoglycemia today with FS of 54 only   - dextrose push x 1 ordered, continue HSS for now   - while patient is NPO please continue d5+NS, add K in the fluids if low   - patient might need lantus once on a po diet for better control of DM

## 2018-04-22 LAB
-  AMIKACIN: SIGNIFICANT CHANGE UP
-  AMOXICILLIN/CLAVULANIC ACID: SIGNIFICANT CHANGE UP
-  AMPICILLIN/SULBACTAM: SIGNIFICANT CHANGE UP
-  AMPICILLIN: SIGNIFICANT CHANGE UP
-  AZTREONAM: SIGNIFICANT CHANGE UP
-  CEFAZOLIN: SIGNIFICANT CHANGE UP
-  CEFEPIME: SIGNIFICANT CHANGE UP
-  CEFOXITIN: SIGNIFICANT CHANGE UP
-  CEFTRIAXONE: SIGNIFICANT CHANGE UP
-  CIPROFLOXACIN: SIGNIFICANT CHANGE UP
-  ERTAPENEM: SIGNIFICANT CHANGE UP
-  GENTAMICIN: SIGNIFICANT CHANGE UP
-  IMIPENEM: SIGNIFICANT CHANGE UP
-  LEVOFLOXACIN: SIGNIFICANT CHANGE UP
-  MEROPENEM: SIGNIFICANT CHANGE UP
-  NITROFURANTOIN: SIGNIFICANT CHANGE UP
-  PIPERACILLIN/TAZOBACTAM: SIGNIFICANT CHANGE UP
-  TIGECYCLINE: SIGNIFICANT CHANGE UP
-  TOBRAMYCIN: SIGNIFICANT CHANGE UP
-  TRIMETHOPRIM/SULFAMETHOXAZOLE: SIGNIFICANT CHANGE UP
CULTURE RESULTS: SIGNIFICANT CHANGE UP
HCT VFR BLD CALC: 36.4 % — SIGNIFICANT CHANGE UP (ref 34.5–45)
HGB BLD-MCNC: 11.5 G/DL — SIGNIFICANT CHANGE UP (ref 11.5–15.5)
MCHC RBC-ENTMCNC: 29.8 PG — SIGNIFICANT CHANGE UP (ref 27–34)
MCHC RBC-ENTMCNC: 31.5 GM/DL — LOW (ref 32–36)
MCV RBC AUTO: 94.4 FL — SIGNIFICANT CHANGE UP (ref 80–100)
METHOD TYPE: SIGNIFICANT CHANGE UP
ORGANISM # SPEC MICROSCOPIC CNT: SIGNIFICANT CHANGE UP
ORGANISM # SPEC MICROSCOPIC CNT: SIGNIFICANT CHANGE UP
PLATELET # BLD AUTO: 230 K/UL — SIGNIFICANT CHANGE UP (ref 150–400)
RBC # BLD: 3.86 M/UL — SIGNIFICANT CHANGE UP (ref 3.8–5.2)
RBC # FLD: 13.9 % — SIGNIFICANT CHANGE UP (ref 10.3–14.5)
SPECIMEN SOURCE: SIGNIFICANT CHANGE UP
WBC # BLD: 8.2 K/UL — SIGNIFICANT CHANGE UP (ref 3.8–10.5)
WBC # FLD AUTO: 8.2 K/UL — SIGNIFICANT CHANGE UP (ref 3.8–10.5)

## 2018-04-22 PROCEDURE — 99231 SBSQ HOSP IP/OBS SF/LOW 25: CPT

## 2018-04-22 PROCEDURE — 74018 RADEX ABDOMEN 1 VIEW: CPT | Mod: 26

## 2018-04-22 RX ADMIN — Medication 200 MILLIGRAM(S): at 01:18

## 2018-04-22 RX ADMIN — Medication 40 MILLIGRAM(S): at 12:10

## 2018-04-22 RX ADMIN — Medication 200 MILLIGRAM(S): at 05:37

## 2018-04-22 RX ADMIN — Medication 15 MILLIGRAM(S): at 13:26

## 2018-04-22 RX ADMIN — PANTOPRAZOLE SODIUM 40 MILLIGRAM(S): 20 TABLET, DELAYED RELEASE ORAL at 12:09

## 2018-04-22 RX ADMIN — Medication 200 MILLIGRAM(S): at 17:16

## 2018-04-22 RX ADMIN — Medication 15 MILLIGRAM(S): at 22:07

## 2018-04-22 RX ADMIN — HEPARIN SODIUM 5000 UNIT(S): 5000 INJECTION INTRAVENOUS; SUBCUTANEOUS at 05:37

## 2018-04-22 RX ADMIN — Medication 15 MILLIGRAM(S): at 05:37

## 2018-04-22 RX ADMIN — HEPARIN SODIUM 5000 UNIT(S): 5000 INJECTION INTRAVENOUS; SUBCUTANEOUS at 22:07

## 2018-04-22 RX ADMIN — Medication 81 MILLIGRAM(S): at 12:10

## 2018-04-22 RX ADMIN — Medication 50 MILLIGRAM(S): at 12:10

## 2018-04-22 RX ADMIN — CLOPIDOGREL BISULFATE 75 MILLIGRAM(S): 75 TABLET, FILM COATED ORAL at 12:10

## 2018-04-22 RX ADMIN — HEPARIN SODIUM 5000 UNIT(S): 5000 INJECTION INTRAVENOUS; SUBCUTANEOUS at 13:26

## 2018-04-22 RX ADMIN — Medication 650 MILLIGRAM(S): at 05:36

## 2018-04-22 NOTE — PROGRESS NOTE ADULT - ASSESSMENT
72 female admitted with SBO on  medical management, resolving clinically and on imagin  -  Advance diet as tolerated  -  DVT prophylaxis  -  Ambulate 72 female admitted with SBO on  medical management, resolving clinically and on imaging  UTI    -  Advance diet as tolerated  -  DVT prophylaxis  -  Ambulate  - cipro

## 2018-04-22 NOTE — PROGRESS NOTE ADULT - SUBJECTIVE AND OBJECTIVE BOX
Patient seen and examined at bedside. Patient states she is feeling well. Complains of mild upper abdominal pain, denies any nausea or vomiting. Had a bowel movement and is passing flatus. NGT was removed yesterday and patient is tolerating liquid diet. Had one episode of fever yesterday of 101.5 and low grade temp 100.8 this AM. Remains hemodynamically stable.       Vital Signs Last 24 Hrs  T(C): 38.2 (2018 05:33), Max: 38.6 (2018 13:32)  T(F): 100.8 (2018 05:33), Max: 101.5 (2018 13:32)  HR: 92 (2018 05:33) (82 - 107)  BP: 104/54 (2018 05:33) (104/54 - 137/76)  BP(mean): --  RR: 18 (2018 05:33) (17 - 18)  SpO2: 100% (2018 05:33) (95% - 100%)      Pain: [ ] YES [X ] NO  Pain (0-10):              Pain Control Adequate: [X ] YES [ ] NO  SOB: [ ]YES [ X] NO  Chest Discomfort: [ ] YES [X ] NO    Nausea: [ ] YES [X ] NO           Vomiting: [ ] YES [X ] NO  Flatus: [ ] YES [X ] NO             Bowel Movement: [ ] YES [X ] NO     Void: [ X]YES [ ]No      General Appearance: Appears well, NAD  Neck: Supple  Chest: Equal expansion bilaterally, equal breath sounds  CV: Regular rate and rhythm   Abdomen: Soft, mildly tender in upper abdomen, + bowel sounds  Extremities: Grossly symmetric    I&O's Summary    2018 07:  -  2018 07:00  --------------------------------------------------------  IN: 0 mL / OUT: 500 mL / NET: -500 mL      I&O's Detail    2018 07:  -  2018 07:00  --------------------------------------------------------  IN:  Total IN: 0 mL    OUT:    Voided: 500 mL  Total OUT: 500 mL    Total NET: -500 mL          MEDICATIONS  (STANDING):  aspirin enteric coated 81 milliGRAM(s) Oral daily  busPIRone 15 milliGRAM(s) Oral three times a day  ciprofloxacin   IVPB 400 milliGRAM(s) IV Intermittent every 12 hours  ciprofloxacin   IVPB      clopidogrel Tablet 75 milliGRAM(s) Oral daily  dextrose 5% + sodium chloride 0.9%. 1000 milliLiter(s) (100 mL/Hr) IV Continuous <Continuous>  dextrose 5%. 1000 milliLiter(s) (50 mL/Hr) IV Continuous <Continuous>  dextrose 5%. 1000 milliLiter(s) (50 mL/Hr) IV Continuous <Continuous>  dextrose 50% Injectable 12.5 Gram(s) IV Push once  dextrose 50% Injectable 25 Gram(s) IV Push once  dextrose 50% Injectable 25 Gram(s) IV Push once  FLUoxetine 40 milliGRAM(s) Oral daily  heparin  Injectable 5000 Unit(s) SubCutaneous every 8 hours  insulin lispro (HumaLOG) corrective regimen sliding scale   SubCutaneous three times a day before meals  pantoprazole  Injectable 40 milliGRAM(s) IV Push daily  traZODone 50 milliGRAM(s) Oral daily    MEDICATIONS  (PRN):  acetaminophen   Tablet 650 milliGRAM(s) Oral every 6 hours PRN For Temp greater than 38 C (100.4 F)  dextrose Gel 1 Dose(s) Oral once PRN Blood Glucose LESS THAN 70 milliGRAM(s)/deciliter  glucagon  Injectable 1 milliGRAM(s) IntraMuscular once PRN Glucose LESS THAN 70 milligrams/deciliter      LABS:                        10.8   5.6   )-----------( 219      ( 2018 14:16 )             35.2     04-21    142  |  110<H>  |  24<H>  ----------------------------<  53<L>  4.1   |  23  |  1.48<H>    Ca    7.8<L>      2018 07:31  Mg     2.5     04-20    TPro  6.9  /  Alb  3.1<L>  /  TBili  0.6  /  DBili  x   /  AST  34  /  ALT  19  /  AlkPhos  68  04-21    PT/INR - ( 2018 12:19 )   PT: 11.5 sec;   INR: 1.05 ratio         PTT - ( 2018 12:19 )  PTT:30.1 sec  Urinalysis Basic - ( 2018 17:37 )    Color: Yellow / Appearance: Clear / S.015 / pH: x  Gluc: x / Ketone: Trace  / Bili: Negative / Urobili: Negative   Blood: x / Protein: 15 / Nitrite: Negative   Leuk Esterase: Trace / RBC: 0-2 /HPF / WBC 3-5 /HPF   Sq Epi: x / Non Sq Epi: Few /HPF / Bacteria: Few /HPF        RADIOLOGY & ADDITIONAL STUDIES: Patient seen and examined at bedside. Patient states she is feeling well. Complains of mild upper abdominal pain, denies any nausea or vomiting. Had a bowel movement and is passing flatus. NGT was removed yesterday and patient is tolerating liquid diet. Had one episode of fever yesterday of 101.5 and low grade temp 100.8 this AM. Remains hemodynamically stable.  UA positive for UTI - started on cipro.       Vital Signs Last 24 Hrs  T(C): 38.2 (2018 05:33), Max: 38.6 (2018 13:32)  T(F): 100.8 (2018 05:33), Max: 101.5 (2018 13:32)  HR: 92 (2018 05:33) (82 - 107)  BP: 104/54 (2018 05:33) (104/54 - 137/76)  BP(mean): --  RR: 18 (2018 05:33) (17 - 18)  SpO2: 100% (2018 05:33) (95% - 100%)      Pain: [ ] YES [X ] NO  Pain (0-10):              Pain Control Adequate: [X ] YES [ ] NO  SOB: [ ]YES [ X] NO  Chest Discomfort: [ ] YES [X ] NO    Nausea: [ ] YES [X ] NO           Vomiting: [ ] YES [X ] NO  Flatus: [ ] YES [X ] NO             Bowel Movement: [ ] YES [X ] NO     Void: [ X]YES [ ]No      General Appearance: Appears well, NAD  Neck: Supple  Chest: Equal expansion bilaterally, equal breath sounds  CV: Regular rate and rhythm   Abdomen: Soft, mildly tender in upper abdomen, + bowel sounds  Extremities: Grossly symmetric      I&O's Summary    2018 07:  -  2018 07:00  --------------------------------------------------------  IN: 0 mL / OUT: 500 mL / NET: -500 mL      I&O's Detail    2018 07:  -  2018 07:00  --------------------------------------------------------  IN:  Total IN: 0 mL    OUT:    Voided: 500 mL  Total OUT: 500 mL    Total NET: -500 mL          MEDICATIONS  (STANDING):  aspirin enteric coated 81 milliGRAM(s) Oral daily  busPIRone 15 milliGRAM(s) Oral three times a day  ciprofloxacin   IVPB 400 milliGRAM(s) IV Intermittent every 12 hours  ciprofloxacin   IVPB      clopidogrel Tablet 75 milliGRAM(s) Oral daily  dextrose 5% + sodium chloride 0.9%. 1000 milliLiter(s) (100 mL/Hr) IV Continuous <Continuous>  dextrose 5%. 1000 milliLiter(s) (50 mL/Hr) IV Continuous <Continuous>  dextrose 5%. 1000 milliLiter(s) (50 mL/Hr) IV Continuous <Continuous>  dextrose 50% Injectable 12.5 Gram(s) IV Push once  dextrose 50% Injectable 25 Gram(s) IV Push once  dextrose 50% Injectable 25 Gram(s) IV Push once  FLUoxetine 40 milliGRAM(s) Oral daily  heparin  Injectable 5000 Unit(s) SubCutaneous every 8 hours  insulin lispro (HumaLOG) corrective regimen sliding scale   SubCutaneous three times a day before meals  pantoprazole  Injectable 40 milliGRAM(s) IV Push daily  traZODone 50 milliGRAM(s) Oral daily    MEDICATIONS  (PRN):  acetaminophen   Tablet 650 milliGRAM(s) Oral every 6 hours PRN For Temp greater than 38 C (100.4 F)  dextrose Gel 1 Dose(s) Oral once PRN Blood Glucose LESS THAN 70 milliGRAM(s)/deciliter  glucagon  Injectable 1 milliGRAM(s) IntraMuscular once PRN Glucose LESS THAN 70 milligrams/deciliter      LABS:                        10.8   5.6   )-----------( 219      ( 2018 14:16 )             35.2     04-21    142  |  110<H>  |  24<H>  ----------------------------<  53<L>  4.1   |  23  |  1.48<H>    Ca    7.8<L>      2018 07:31  Mg     2.5     04-20    TPro  6.9  /  Alb  3.1<L>  /  TBili  0.6  /  DBili  x   /  AST  34  /  ALT  19  /  AlkPhos  68  04-21    PT/INR - ( 2018 12:19 )   PT: 11.5 sec;   INR: 1.05 ratio         PTT - ( 2018 12:19 )  PTT:30.1 sec  Urinalysis Basic - ( 2018 17:37 )    Color: Yellow / Appearance: Clear / S.015 / pH: x  Gluc: x / Ketone: Trace  / Bili: Negative / Urobili: Negative   Blood: x / Protein: 15 / Nitrite: Negative   Leuk Esterase: Trace / RBC: 0-2 /HPF / WBC 3-5 /HPF   Sq Epi: x / Non Sq Epi: Few /HPF / Bacteria: Few /HPF        RADIOLOGY & ADDITIONAL STUDIES:

## 2018-04-23 ENCOUNTER — TRANSCRIPTION ENCOUNTER (OUTPATIENT)
Age: 73
End: 2018-04-23

## 2018-04-23 VITALS
DIASTOLIC BLOOD PRESSURE: 67 MMHG | HEART RATE: 88 BPM | SYSTOLIC BLOOD PRESSURE: 125 MMHG | OXYGEN SATURATION: 100 % | RESPIRATION RATE: 17 BRPM | TEMPERATURE: 99 F

## 2018-04-23 LAB
-  AMIKACIN: SIGNIFICANT CHANGE UP
-  AMOXICILLIN/CLAVULANIC ACID: SIGNIFICANT CHANGE UP
-  AMPICILLIN/SULBACTAM: SIGNIFICANT CHANGE UP
-  AMPICILLIN: SIGNIFICANT CHANGE UP
-  AZTREONAM: SIGNIFICANT CHANGE UP
-  CEFAZOLIN: SIGNIFICANT CHANGE UP
-  CEFEPIME: SIGNIFICANT CHANGE UP
-  CEFOXITIN: SIGNIFICANT CHANGE UP
-  CEFTRIAXONE: SIGNIFICANT CHANGE UP
-  CIPROFLOXACIN: SIGNIFICANT CHANGE UP
-  ERTAPENEM: SIGNIFICANT CHANGE UP
-  GENTAMICIN: SIGNIFICANT CHANGE UP
-  IMIPENEM: SIGNIFICANT CHANGE UP
-  LEVOFLOXACIN: SIGNIFICANT CHANGE UP
-  MEROPENEM: SIGNIFICANT CHANGE UP
-  NITROFURANTOIN: SIGNIFICANT CHANGE UP
-  PIPERACILLIN/TAZOBACTAM: SIGNIFICANT CHANGE UP
-  TIGECYCLINE: SIGNIFICANT CHANGE UP
-  TOBRAMYCIN: SIGNIFICANT CHANGE UP
-  TRIMETHOPRIM/SULFAMETHOXAZOLE: SIGNIFICANT CHANGE UP
CULTURE RESULTS: SIGNIFICANT CHANGE UP
HCT VFR BLD CALC: 33.4 % — LOW (ref 34.5–45)
HGB BLD-MCNC: 10.8 G/DL — LOW (ref 11.5–15.5)
MCHC RBC-ENTMCNC: 30.5 PG — SIGNIFICANT CHANGE UP (ref 27–34)
MCHC RBC-ENTMCNC: 32.4 GM/DL — SIGNIFICANT CHANGE UP (ref 32–36)
MCV RBC AUTO: 94.2 FL — SIGNIFICANT CHANGE UP (ref 80–100)
METHOD TYPE: SIGNIFICANT CHANGE UP
ORGANISM # SPEC MICROSCOPIC CNT: SIGNIFICANT CHANGE UP
ORGANISM # SPEC MICROSCOPIC CNT: SIGNIFICANT CHANGE UP
PLATELET # BLD AUTO: 224 K/UL — SIGNIFICANT CHANGE UP (ref 150–400)
RBC # BLD: 3.54 M/UL — LOW (ref 3.8–5.2)
RBC # FLD: 13.8 % — SIGNIFICANT CHANGE UP (ref 10.3–14.5)
SPECIMEN SOURCE: SIGNIFICANT CHANGE UP
WBC # BLD: 7.6 K/UL — SIGNIFICANT CHANGE UP (ref 3.8–10.5)
WBC # FLD AUTO: 7.6 K/UL — SIGNIFICANT CHANGE UP (ref 3.8–10.5)

## 2018-04-23 PROCEDURE — 85610 PROTHROMBIN TIME: CPT

## 2018-04-23 PROCEDURE — 99238 HOSP IP/OBS DSCHRG MGMT 30/<: CPT

## 2018-04-23 PROCEDURE — 84484 ASSAY OF TROPONIN QUANT: CPT

## 2018-04-23 PROCEDURE — 87040 BLOOD CULTURE FOR BACTERIA: CPT

## 2018-04-23 PROCEDURE — 82962 GLUCOSE BLOOD TEST: CPT

## 2018-04-23 PROCEDURE — 83735 ASSAY OF MAGNESIUM: CPT

## 2018-04-23 PROCEDURE — 80053 COMPREHEN METABOLIC PANEL: CPT

## 2018-04-23 PROCEDURE — 99285 EMERGENCY DEPT VISIT HI MDM: CPT | Mod: 25

## 2018-04-23 PROCEDURE — 82553 CREATINE MB FRACTION: CPT

## 2018-04-23 PROCEDURE — 87186 SC STD MICRODIL/AGAR DIL: CPT

## 2018-04-23 PROCEDURE — 81001 URINALYSIS AUTO W/SCOPE: CPT

## 2018-04-23 PROCEDURE — 83036 HEMOGLOBIN GLYCOSYLATED A1C: CPT

## 2018-04-23 PROCEDURE — 93005 ELECTROCARDIOGRAM TRACING: CPT

## 2018-04-23 PROCEDURE — 85730 THROMBOPLASTIN TIME PARTIAL: CPT

## 2018-04-23 PROCEDURE — 74176 CT ABD & PELVIS W/O CONTRAST: CPT

## 2018-04-23 PROCEDURE — 96374 THER/PROPH/DIAG INJ IV PUSH: CPT

## 2018-04-23 PROCEDURE — 82550 ASSAY OF CK (CPK): CPT

## 2018-04-23 PROCEDURE — 74018 RADEX ABDOMEN 1 VIEW: CPT

## 2018-04-23 PROCEDURE — 83605 ASSAY OF LACTIC ACID: CPT

## 2018-04-23 PROCEDURE — 96375 TX/PRO/DX INJ NEW DRUG ADDON: CPT | Mod: 76

## 2018-04-23 PROCEDURE — 85027 COMPLETE CBC AUTOMATED: CPT

## 2018-04-23 PROCEDURE — 87086 URINE CULTURE/COLONY COUNT: CPT

## 2018-04-23 PROCEDURE — 71045 X-RAY EXAM CHEST 1 VIEW: CPT

## 2018-04-23 PROCEDURE — 83690 ASSAY OF LIPASE: CPT

## 2018-04-23 RX ORDER — CIPROFLOXACIN LACTATE 400MG/40ML
1 VIAL (ML) INTRAVENOUS
Qty: 10 | Refills: 0
Start: 2018-04-23 | End: 2018-04-27

## 2018-04-23 RX ADMIN — Medication 200 MILLIGRAM(S): at 05:23

## 2018-04-23 RX ADMIN — Medication 2: at 07:57

## 2018-04-23 RX ADMIN — Medication 15 MILLIGRAM(S): at 05:23

## 2018-04-23 RX ADMIN — HEPARIN SODIUM 5000 UNIT(S): 5000 INJECTION INTRAVENOUS; SUBCUTANEOUS at 05:24

## 2018-04-23 NOTE — DISCHARGE NOTE ADULT - PATIENT PORTAL LINK FT
You can access the [email or web address] Patient Portal, offered by [facility], by registering with the following website: [email or web address]

## 2018-04-23 NOTE — DISCHARGE NOTE ADULT - SECONDARY DIAGNOSIS.
CAD (Coronary Artery Disease) Diabetes mellitus type II, controlled GERD (gastroesophageal reflux disease) HTN (Hypertension)

## 2018-04-23 NOTE — PROGRESS NOTE ADULT - SUBJECTIVE AND OBJECTIVE BOX
/68 // HR 92 // RR 17 // O2 99 // T 98.8 (Tmax @ 5am yesterday 100.8)  Gen:  Cor:  Pulm:  Abd:    A+P:  73 yo with resolving SBO (tolerating diet, having BM, passing flatus) and UTI on Ciprofloxacin (E.Coli+; Sensitive)  1) Continue diet  2) Continue Abx 3 days of treatment (started 4/22... last dose 25) Patient seen and evaluated at bedside.  No nausea or vomiting.  Last BM yesterday.  Passing flatus.  Denies abdominal pain.  Tolerating diet.    /68 // HR 92 // RR 17 // O2 99 // T 98.8 (Tmax @ 5am yesterday 100.8)  Gen: NAD  Cor: RRR  Pulm: Nonlabored  Abd: soft, nontender, nondistended    A+P:  73 yo with resolving SBO (tolerating diet, having BM, passing flatus) and UTI on Ciprofloxacin (E.Coli+; Sensitive)  1) Continue diet  2) Continue Abx 3 days of treatment (started 4/22 at 01:18... last dose 4/25)  3) Likely D/C today Patient seen and evaluated at bedside.  No nausea or vomiting.  Last BM yesterday.  Passing flatus.  Denies abdominal pain.  Tolerating diet. On abx for UTI.    /68 // HR 92 // RR 17 // O2 99 // T 98.8 (Tmax @ 5am yesterday 100.8)  Gen: NAD  Cor: RRR  Pulm: Nonlabored  Abd: soft, nontender, nondistended    A+P:  71 yo with resolving SBO (tolerating diet, having BM, passing flatus) and UTI on Ciprofloxacin (E.Coli+; Sensitive)  1) Continue diet  2) Continue Abx 3 days of treatment (started 4/22 at 01:18... last dose 4/25)  3) Likely D/C today

## 2018-04-23 NOTE — DISCHARGE NOTE ADULT - VISION (WITH CORRECTIVE LENSES IF THE PATIENT USUALLY WEARS THEM):
Normal vision: sees adequately in most situations; can see medication labels, newsprint/B/L Cataract Sx 2013

## 2018-04-23 NOTE — DISCHARGE NOTE ADULT - MEDICATION SUMMARY - MEDICATIONS TO TAKE
I will START or STAY ON the medications listed below when I get home from the hospital:    aspirin 81 mg oral delayed release tablet  -- 1 tab(s) by mouth once a day  -- Indication: For CAD (Coronary Artery Disease)    diltiazem 180 mg/24 hours oral capsule, extended release  -- 1 cap(s) by mouth once a day  -- Indication: For HTN (Hypertension)    FLUoxetine 40 mg oral capsule  -- 1 cap(s) by mouth once a day  -- Indication: For Depression     traZODone 50 mg oral tablet  -- 1 tab(s) by mouth once a day  -- Indication: For Depression     insulin glargine  -- 10 unit(s) subcutaneous once a day (at bedtime)  -- Indication: For DMII    glimepiride 4 mg oral tablet  -- 1 tab(s) by mouth once a day  -- Indication: For DMII     atorvastatin 80 mg oral tablet  -- 1 tab(s) by mouth once a day (at bedtime)  -- Indication: For HLD     Plavix 75 mg oral tablet  -- 1 tab(s) by mouth once a day  -- Indication: For CAD (Coronary Artery Disease)    busPIRone 15 mg oral tablet  -- 1 tab(s) by mouth 3 times a day  -- Indication: For Depression     docusate sodium 100 mg oral capsule  -- 1 cap(s) by mouth 2 times a day  -- Indication: For Constipation     polyethylene glycol 3350 oral powder for reconstitution  -- 17 gram(s) by mouth once a day  -- Indication: For Constipation     pantoprazole 40 mg oral delayed release tablet  -- 1 tab(s) by mouth once a day (before a meal)  -- Indication: For gerd     Cipro 500 mg oral tablet  -- 1 tab(s) by mouth every 12 hours   -- Avoid prolonged or excessive exposure to direct and/or artificial sunlight while taking this medication.  Check with your doctor before becoming pregnant.  Do not take dairy products, antacids, or iron preparations within one hour of this medication.  Finish all this medication unless otherwise directed by prescriber.  Medication should be taken with plenty of water.    -- Indication: For UTI

## 2018-04-23 NOTE — DISCHARGE NOTE ADULT - PLAN OF CARE
resolved diet as tolerated   follow up with Dr. Hanna as needed continue current regimen and follow up with PCp

## 2018-04-23 NOTE — DISCHARGE NOTE ADULT - CARE PLAN
Principal Discharge DX:	SBO (small bowel obstruction)  Goal:	resolved  Assessment and plan of treatment:	diet as tolerated   follow up with Dr. Hanna as needed  Secondary Diagnosis:	CAD (Coronary Artery Disease)  Goal:	continue current regimen and follow up with PCp  Secondary Diagnosis:	Diabetes mellitus type II, controlled  Goal:	continue current regimen and follow up with PCp  Secondary Diagnosis:	GERD (gastroesophageal reflux disease)  Goal:	continue current regimen and follow up with PCp  Secondary Diagnosis:	HTN (Hypertension)  Goal:	continue current regimen and follow up with PCp

## 2018-04-23 NOTE — PROGRESS NOTE ADULT - ATTENDING COMMENTS
Agree with above  Clinically looks well. Tolerated diet. Abd benign.  UTI on cipro, ecoli    Ok for dc home today  Cont abx x 3 days  Fu with PCP after discharge
Agree with above  SBO, being managed with nonoperative mgmt. Likely adhesive in nature.  Abd exam benign    Maintain NPO with NGT today  Likely can dc NGT tomorrow  Med consult re medical mgmt of comorbidities including DM and CAD
Agree with above  Clinically looks well   UA positive for UTI - started on cipro (sensitivites pending)  Abd exam benign  Tolerated clear liquid diet    - advance diet to low residue  - continue cipro - fu cultures  - possible dc home tomorrow

## 2018-04-23 NOTE — DISCHARGE NOTE ADULT - HOSPITAL COURSE
71 y/o F pt w/ PMhx of renal insuffiencey, DM, MI, PUD, anemia, depression, GERD, OA, HLD, diabetic neuropathy, coronary stent, CAD and PSHx of cataract extraction, stented coronary artery, bowel obstruction, hip replacement c/o emesis, abd pain, decreased PO intake x 5 days.Pt rates pain as 8/10 when present, but improved presently.. Last bowel movement today.   Pt reports a bowel obstruction in the remote past caused by a metal object lodged in the intra-abdomen and caused her to unable to defecate or urinate. Denies bloody stools, fever.  Patient clinically improved. Had return of flatus and BM. Patient diet was advanced and tolerated. Patient found to have UTI. Patient for d/c home on oral antibiotics 71 y/o F pt w/ PMhx of renal insuffiencey, DM, MI, PUD, anemia, depression, GERD, OA, HLD, diabetic neuropathy, coronary stent, CAD and PSHx of cataract extraction, stented coronary artery, bowel obstruction, hip replacement c/o emesis, abd pain, decreased PO intake x 5 days.Pt rates pain as 8/10 when present, but improved presently.. Last bowel movement today.   Pt reports a bowel obstruction in the remote past caused by a metal object lodged in the intra-abdomen and caused her to unable to defecate or urinate. Denies bloody stools, fever. Diagnosed with SBO.  Patient clinically improved. Had return of flatus and BM. Patient diet was advanced and tolerated. Patient found to have UTI. Patient for d/c home on oral antibiotics

## 2018-04-27 LAB
CULTURE RESULTS: SIGNIFICANT CHANGE UP
SPECIMEN SOURCE: SIGNIFICANT CHANGE UP

## 2018-08-31 ENCOUNTER — APPOINTMENT (OUTPATIENT)
Dept: ORTHOPEDIC SURGERY | Facility: CLINIC | Age: 73
End: 2018-08-31

## 2018-09-07 ENCOUNTER — APPOINTMENT (OUTPATIENT)
Dept: ORTHOPEDIC SURGERY | Facility: CLINIC | Age: 73
End: 2018-09-07

## 2019-04-26 ENCOUNTER — APPOINTMENT (OUTPATIENT)
Dept: ORTHOPEDIC SURGERY | Facility: CLINIC | Age: 74
End: 2019-04-26

## 2019-09-03 ENCOUNTER — APPOINTMENT (OUTPATIENT)
Dept: VASCULAR SURGERY | Facility: CLINIC | Age: 74
End: 2019-09-03

## 2019-10-01 ENCOUNTER — APPOINTMENT (OUTPATIENT)
Dept: VASCULAR SURGERY | Facility: CLINIC | Age: 74
End: 2019-10-01
Payer: MEDICARE

## 2019-10-01 DIAGNOSIS — I65.29 OCCLUSION AND STENOSIS OF UNSPECIFIED CAROTID ARTERY: ICD-10-CM

## 2019-10-01 PROCEDURE — 99204 OFFICE O/P NEW MOD 45 MIN: CPT

## 2019-10-01 PROCEDURE — 93880 EXTRACRANIAL BILAT STUDY: CPT

## 2019-10-07 NOTE — PROCEDURE
[FreeTextEntry1] : Carotid duplex: <50% bilaterally. Slightly abnormal waveform noted in R vert. artery suggestive of prox. stenosis. R subclavian artery velocity measures 281 cm/s.

## 2019-10-07 NOTE — ASSESSMENT
[FreeTextEntry1] : 72 yo F with PMHx of HTN, T2DM, HLD, CAD, s/p PCI/stents, CKD  who was referred by her primary care provider to be evaluated for carotid artery disease.\par Patient with no prior history of CVA/TIA, no neurologic deficits.\par Physical exam id unremarkable, no carotid bruit was appreciated.\par Carotid duplex was performed in the office demonstrating  <50% bilaterally. Slightly abnormal waveform noted in R vert. artery suggestive of prox. stenosis. R subclavian artery velocity measures 281 cm/s.\par Patient was recommended to stay on ASA and statin.\par F/u in 6 months.\par \par \par I personally discussed this patient with the Physician Assistant at the time of the visit. I agree with the assessment and plan as written\par

## 2019-10-07 NOTE — REVIEW OF SYSTEMS
[Negative] : Heme/Lymph [Limb Pain] : no limb pain [Limb Swelling] : no limb swelling [Dizziness] : no dizziness [Fainting] : no fainting

## 2019-10-07 NOTE — PHYSICAL EXAM
[Normal Breath Sounds] : Normal breath sounds [Normal Heart Sounds] : normal heart sounds [2+] : left 2+ [No Rash or Lesion] : No rash or lesion [Alert] : alert [Calm] : calm [JVD] : no jugular venous distention  [Carotid Bruits] : no carotid bruits [Right Carotid Bruit] : no bruit heard over the right carotid [Left Carotid Bruit] : no bruit heard over the left carotid [Ankle Swelling (On Exam)] : not present [Varicose Veins Of Lower Extremities] : not present [Abdomen Tenderness] : ~T ~M No abdominal tenderness [Abdominal Bruit] : no abdominal bruit  [de-identified] : GREY, NAD, pleasant [de-identified] : NC/AT [de-identified] : supple [de-identified] : BS+, NT/ND [de-identified] : + FROM all 4 extremities [de-identified] : grossly intact

## 2019-10-07 NOTE — HISTORY OF PRESENT ILLNESS
[FreeTextEntry1] : 74 yo F with PMHx of HTN, T2DM, HLD, CAD, s/p PCI/stents, CKD  who was referred by her primary care provider to be evaluated for carotid artery disease. Patient states that she had multiple tests, not sure exactly what they are. She denies dizziness, lightheadedness, hx of TIA/CVA. No neurologic deficits, no vision changes. She ambulates without difficulties, denies claudication, LE edema, skin changes, hx of DVT.

## 2019-12-04 ENCOUNTER — APPOINTMENT (OUTPATIENT)
Dept: ORTHOPEDIC SURGERY | Facility: CLINIC | Age: 74
End: 2019-12-04
Payer: MEDICARE

## 2019-12-04 VITALS
SYSTOLIC BLOOD PRESSURE: 125 MMHG | DIASTOLIC BLOOD PRESSURE: 72 MMHG | WEIGHT: 144 LBS | HEIGHT: 61 IN | HEART RATE: 87 BPM | BODY MASS INDEX: 27.19 KG/M2

## 2019-12-04 DIAGNOSIS — M17.0 BILATERAL PRIMARY OSTEOARTHRITIS OF KNEE: ICD-10-CM

## 2019-12-04 PROCEDURE — 20611 DRAIN/INJ JOINT/BURSA W/US: CPT | Mod: LT

## 2019-12-04 PROCEDURE — 99204 OFFICE O/P NEW MOD 45 MIN: CPT | Mod: 25

## 2019-12-04 PROCEDURE — 73564 X-RAY EXAM KNEE 4 OR MORE: CPT | Mod: LT

## 2019-12-04 RX ORDER — DICLOFENAC SODIUM 20 MG/G
2 SOLUTION TOPICAL
Qty: 1 | Refills: 0 | Status: ACTIVE | COMMUNITY
Start: 2019-12-04 | End: 1900-01-01

## 2019-12-05 PROBLEM — M17.0 PRIMARY OSTEOARTHRITIS OF BOTH KNEES: Status: ACTIVE | Noted: 2018-08-29

## 2019-12-05 NOTE — HISTORY OF PRESENT ILLNESS
[de-identified] : CC BILATERAL knee\par \par HP DANETTE , 74 year old F RHD presents with chronic onset of 3 years of Activity Related pain in the medial LEFT>RIGHT knee without injury. The pain is worse, and rated a 9# out of 10, described as Sharp, without radiation. Rest makes the pain better and sit-stand/stairs makes the pain worse. The patient reports associated symptoms of Stiffness. The patient Reports pain at night affecting sleep, and denies similar pain previously.\par \par The patient has tried the following treatments:\par Activity modification	+\par Ice/Compression  	               + \par Braces    		               - \par Nsaids    		                +\par Physical Therapy  	               -\par Cortisone Injection	               + 3 years ago 60% relief for 5 months\par Visco Injection		-\par Arthroscopy		-\par \par Review of Systems is positive for the above musculoskeletal symptoms and is otherwise non-contributory for general, constitutional, psychiatric, neurologic, HEENT, cardiac, respiratory, gastrointestinal, reproductive, lymphatic, and dermatologic complaints.\par \par Consult by DEEPAK Acosta\par \par

## 2019-12-05 NOTE — DISCUSSION/SUMMARY
[de-identified] : Bilateral knee osteoarthritis, greater in medial compartment with Left more symptomatic than Right\par \par The patient and I discussed the causes and progression of degenerative joint disease of the knee. Models, diagrams and drawings were used in the discussion. Treatment can include conservative non-operative management and surgical options. Conservative management includes weight loss, activity modification, physical therapy to improve motion and strength in the muscles around the knee and the body's core, PO and topical NSAIDs, corticosteroid and/or viscosupplementation intra-articular injections. If the patient fails to improve with non-operative management, surgical management is possible. Depending upon the patient's age, BMI, activity level, degree and location of arthrosis different surgical options are possible including arthroscopic debridement with chondroplasty, high-tibial osteotomy, unicondylar/partial arthroplasty, and total joint arthroplasty.\par \par Physical therapy was prescribed for knee ROM exercises, strengthening exercises, deep tissue massage, core strengthening, hip abductor strengthening, VMO strengthening, modalities PRN, and home exercises.\par \par Procedure Note:\par \par Verbal consent was obtained for an arthrocentesis and intra-articular ketorolac injection of the RIGHT and LEFT knee, after the risks and benefits were discussed with the patient. Potential adverse effects were discussed including but not limited to bleeding, skin/joint infection, local skin reactions including bleaching, bruising, stiffness, soreness, vasovagal episodes, transient hyperglycemia, avascular necrosis, pseudo-septic type reactions, post injection joint pain, allergic reaction to product or anesthetic and other rare but potential adverse effects along with benefits including decreased pain and improved stability prior to obtaining verbal informed consent. It was also discussed that for some patients the treatment is ineffective and there are no guarantees that the patient will experience improvement as the result of the injection. In rare occasions the injection can cause worsening of pain.\par \par The use of a Sonosite 15-6 MHz linear transducer with live ultrasound guidance of the knee was necessary given the patient's BMI and local body habitus overlying and obscuring the accurate identification of normal body bony anatomy used to identify the injection site and the depth of soft tissue envelope necessitating a longer than normal needle to reach the joint space, and to confirm the location of the needle tip and intra-articular delivery of the medication. Without the use of live ultrasound guidance the injection would have been more difficult and place the patient's neurovascular structures at risk from the longer needle needed to traverse the soft tissue envelope.\par \par A 6 inch bolster was placed underneath the RIGHT knee to place the knee in a slightly flexed position. The superolateral injection site was identified using the ultrasound probe to identify the suprapatellar space and superior boarder of the patella first longitudinally and then transversely. The injection site was marked and prepped with a ChloraPrep swab and anesthetized with ethylchloride skin anesthesia. Using sterile technique a 20g 3 1/2 in spinal needle with 4 cc total of 1cc 1% lidocaine without epinephrine, 1cc 0.25% Marcaine without epinephrine and 2cc of 60mg/mL Ketorolac was passed through the injection site towards the suprapatellar space under live ultrasound guidance and noted to penetrate the joint capsule. The medication was injected without resistance under live ultrasound visualization and noted to flow into the suprapatellar joint space. The injection site was sterilely dressed, there was minimal blood loss.\par \par A 6 inch bolster was placed underneath the LEFT knee to place the knee in a slightly flexed position. The superolateral injection site was identified using the ultrasound probe to identify the suprapatellar space and superior boarder of the patella first longitudinally and then transversely. The injection site was marked and prepped with a ChloraPrep swab and anesthetized with ethylchloride skin anesthesia. Using sterile technique a 20g 3 1/2 in spinal needle with 4 cc total of 1cc 1% lidocaine without epinephrine, 1cc 0.25% Marcaine without epinephrine and 2cc of 60mg/mL Ketorolac was passed through the injection site towards the suprapatellar space under live ultrasound guidance and noted to penetrate the joint capsule. The medication was injected without resistance under live ultrasound visualization and noted to flow into the suprapatellar joint space. The injection site was sterilely dressed, there was minimal blood loss.\par \par The patient tolerated this procedure without any complications done by myself. Images were recorded and saved.\par \par The patient was prescribed Diclofenac topical liquid/creme non-steroidal anti-inflammatory medication. 1-2 pumps twice daily and apply to area with pain. There is low systemic absorption of the medication but risks while reduced remain were discussed and include but not limited to renal damage and GI ulceration and bleeding. They were warned to stop the medication if worsening buring skin or gastric pain or dizziness or other side effects. Also to immediately stop the medication and seek appropriate medical attention if any severe stomach ache, gastritis, black/red vomit, black/red stools or any other medical concern.\par \par patient wants to hold on visco injection\par \par The patient verifies their understanding the the visit, diagnosis and plan. They agree with the treatment plan and will contact the office with any questions or problems.\par Follow up\par PRN\par

## 2020-02-14 ENCOUNTER — EMERGENCY (EMERGENCY)
Facility: HOSPITAL | Age: 75
LOS: 1 days | Discharge: HOME | End: 2020-02-14
Attending: EMERGENCY MEDICINE
Payer: COMMERCIAL

## 2020-02-14 VITALS
SYSTOLIC BLOOD PRESSURE: 130 MMHG | HEIGHT: 61 IN | DIASTOLIC BLOOD PRESSURE: 76 MMHG | OXYGEN SATURATION: 100 % | WEIGHT: 143.96 LBS | RESPIRATION RATE: 20 BRPM | HEART RATE: 100 BPM | TEMPERATURE: 98 F

## 2020-02-14 PROCEDURE — 71046 X-RAY EXAM CHEST 2 VIEWS: CPT | Mod: 26

## 2020-02-14 PROCEDURE — 82962 GLUCOSE BLOOD TEST: CPT

## 2020-02-14 PROCEDURE — 99283 EMERGENCY DEPT VISIT LOW MDM: CPT

## 2020-02-14 PROCEDURE — 99283 EMERGENCY DEPT VISIT LOW MDM: CPT | Mod: 25

## 2020-02-14 PROCEDURE — 10060 I&D ABSCESS SIMPLE/SINGLE: CPT | Mod: RT

## 2020-02-14 PROCEDURE — 10060 I&D ABSCESS SIMPLE/SINGLE: CPT

## 2020-02-14 PROCEDURE — 71046 X-RAY EXAM CHEST 2 VIEWS: CPT

## 2020-02-14 NOTE — ED PROVIDER NOTE - OBJECTIVE STATEMENT
73 y/o F with a significant PMHx of anemia, CAD, stents, DM, HTN, HLD, OA, PUD, presents to the ER for left middle finger pain x5 days. Patient noticed swelling and went to doctor on Tuesday. Patient states she was given bactrim and did not take it consistently because of rashes. Patient states she went to doctor today and was told to go to the ER for further evaluation and drainage. Patient denies any numbness, weakness, fever, chills, use of blood thinners, or any other complaints.

## 2020-02-14 NOTE — ED PROCEDURE NOTE - PROCEDURE ADDITIONAL DETAILS
Advised patient of need of deeper I&D for additional infection along finger. Patient declined and requested outpatient hand follow up. Will DC with hand follow up.

## 2020-02-14 NOTE — ED PROVIDER NOTE - PATIENT PORTAL LINK FT
You can access the FollowMyHealth Patient Portal offered by [facility] by registering at the following website: [email or web address]. By joining [employer]’s FollowMyHealth portal, you will also be able to view your health information using other applications (apps) compatible with our system.

## 2020-02-14 NOTE — ED ADULT NURSE NOTE - OBJECTIVE STATEMENT
States she pulled  a part of skin from left middle finger near nail a week ago since then with pain ,swelling to left middle finger . States she pulled  a part of skin from left middle finger near nail a week ago since then with pain ,swelling to left middle finger .I&D of left middle finger done by Samantha SANTANA ,DSD applied by Samantha SANTANA .

## 2020-02-14 NOTE — ED PROVIDER NOTE - PROGRESS NOTE DETAILS
Infection drained. Patient declined additional I&D of deeper tissue. She requested outpatient follow up with hand surgery. Patient expressed understanding that she could have severe complications if we were unable to completely drain her infection. She understood and states she will follow up ASAP with hand surgery. CXR shows PNA. Will DC with additional Abx.

## 2020-02-14 NOTE — ED PROVIDER NOTE - UPPER EXTREMITY EXAM, LEFT
TENDERNESS/with palpation of fluctuance,  swollen from PIP joint distally, good peripheral pulses otherwise

## 2020-02-14 NOTE — ED ADULT TRIAGE NOTE - CHIEF COMPLAINT QUOTE
Patient states she peeled the skin from around affected finger more than a week ago and swelling and pain started

## 2020-09-15 ENCOUNTER — EMERGENCY (EMERGENCY)
Facility: HOSPITAL | Age: 75
LOS: 1 days | Discharge: HOME | End: 2020-09-15
Attending: STUDENT IN AN ORGANIZED HEALTH CARE EDUCATION/TRAINING PROGRAM
Payer: COMMERCIAL

## 2020-09-15 VITALS
WEIGHT: 138.89 LBS | HEIGHT: 61 IN | HEART RATE: 89 BPM | OXYGEN SATURATION: 93 % | RESPIRATION RATE: 20 BRPM | TEMPERATURE: 99 F | SYSTOLIC BLOOD PRESSURE: 159 MMHG | DIASTOLIC BLOOD PRESSURE: 72 MMHG

## 2020-09-15 VITALS
OXYGEN SATURATION: 99 % | HEART RATE: 68 BPM | TEMPERATURE: 98 F | RESPIRATION RATE: 20 BRPM | DIASTOLIC BLOOD PRESSURE: 87 MMHG | SYSTOLIC BLOOD PRESSURE: 152 MMHG

## 2020-09-15 LAB
ALBUMIN SERPL ELPH-MCNC: 3.6 G/DL — SIGNIFICANT CHANGE UP (ref 3.5–5)
ALP SERPL-CCNC: 69 U/L — SIGNIFICANT CHANGE UP (ref 40–120)
ALT FLD-CCNC: 20 U/L DA — SIGNIFICANT CHANGE UP (ref 10–60)
ANION GAP SERPL CALC-SCNC: 1 MMOL/L — LOW (ref 5–17)
ANION GAP SERPL CALC-SCNC: 4 MMOL/L — LOW (ref 5–17)
APPEARANCE UR: CLEAR — SIGNIFICANT CHANGE UP
AST SERPL-CCNC: 39 U/L — SIGNIFICANT CHANGE UP (ref 10–40)
BACTERIA # UR AUTO: ABNORMAL /HPF
BASOPHILS # BLD AUTO: 0.06 K/UL — SIGNIFICANT CHANGE UP (ref 0–0.2)
BASOPHILS NFR BLD AUTO: 0.9 % — SIGNIFICANT CHANGE UP (ref 0–2)
BILIRUB SERPL-MCNC: 0.4 MG/DL — SIGNIFICANT CHANGE UP (ref 0.2–1.2)
BILIRUB UR-MCNC: NEGATIVE — SIGNIFICANT CHANGE UP
BUN SERPL-MCNC: 10 MG/DL — SIGNIFICANT CHANGE UP (ref 7–18)
BUN SERPL-MCNC: 11 MG/DL — SIGNIFICANT CHANGE UP (ref 7–18)
CALCIUM SERPL-MCNC: 8.8 MG/DL — SIGNIFICANT CHANGE UP (ref 8.4–10.5)
CALCIUM SERPL-MCNC: 9.1 MG/DL — SIGNIFICANT CHANGE UP (ref 8.4–10.5)
CHLORIDE SERPL-SCNC: 108 MMOL/L — SIGNIFICANT CHANGE UP (ref 96–108)
CHLORIDE SERPL-SCNC: 109 MMOL/L — HIGH (ref 96–108)
CO2 SERPL-SCNC: 29 MMOL/L — SIGNIFICANT CHANGE UP (ref 22–31)
CO2 SERPL-SCNC: 31 MMOL/L — SIGNIFICANT CHANGE UP (ref 22–31)
COLOR SPEC: YELLOW — SIGNIFICANT CHANGE UP
CREAT SERPL-MCNC: 1.34 MG/DL — HIGH (ref 0.5–1.3)
CREAT SERPL-MCNC: 1.5 MG/DL — HIGH (ref 0.5–1.3)
D DIMER BLD IA.RAPID-MCNC: 426 NG/ML DDU — HIGH
DIFF PNL FLD: NEGATIVE — SIGNIFICANT CHANGE UP
EOSINOPHIL # BLD AUTO: 0.32 K/UL — SIGNIFICANT CHANGE UP (ref 0–0.5)
EOSINOPHIL NFR BLD AUTO: 4.9 % — SIGNIFICANT CHANGE UP (ref 0–6)
EPI CELLS # UR: SIGNIFICANT CHANGE UP /HPF
GLUCOSE SERPL-MCNC: 104 MG/DL — HIGH (ref 70–99)
GLUCOSE SERPL-MCNC: 80 MG/DL — SIGNIFICANT CHANGE UP (ref 70–99)
GLUCOSE UR QL: NEGATIVE — SIGNIFICANT CHANGE UP
HCT VFR BLD CALC: 34.9 % — SIGNIFICANT CHANGE UP (ref 34.5–45)
HGB BLD-MCNC: 11.7 G/DL — SIGNIFICANT CHANGE UP (ref 11.5–15.5)
IMM GRANULOCYTES NFR BLD AUTO: 0.2 % — SIGNIFICANT CHANGE UP (ref 0–1.5)
KETONES UR-MCNC: NEGATIVE — SIGNIFICANT CHANGE UP
LEUKOCYTE ESTERASE UR-ACNC: ABNORMAL
LIDOCAIN IGE QN: 274 U/L — SIGNIFICANT CHANGE UP (ref 73–393)
LYMPHOCYTES # BLD AUTO: 2.25 K/UL — SIGNIFICANT CHANGE UP (ref 1–3.3)
LYMPHOCYTES # BLD AUTO: 34.5 % — SIGNIFICANT CHANGE UP (ref 13–44)
MAGNESIUM SERPL-MCNC: 2.8 MG/DL — HIGH (ref 1.6–2.6)
MCHC RBC-ENTMCNC: 31.4 PG — SIGNIFICANT CHANGE UP (ref 27–34)
MCHC RBC-ENTMCNC: 33.5 GM/DL — SIGNIFICANT CHANGE UP (ref 32–36)
MCV RBC AUTO: 93.6 FL — SIGNIFICANT CHANGE UP (ref 80–100)
MONOCYTES # BLD AUTO: 0.64 K/UL — SIGNIFICANT CHANGE UP (ref 0–0.9)
MONOCYTES NFR BLD AUTO: 9.8 % — SIGNIFICANT CHANGE UP (ref 2–14)
NEUTROPHILS # BLD AUTO: 3.24 K/UL — SIGNIFICANT CHANGE UP (ref 1.8–7.4)
NEUTROPHILS NFR BLD AUTO: 49.7 % — SIGNIFICANT CHANGE UP (ref 43–77)
NITRITE UR-MCNC: NEGATIVE — SIGNIFICANT CHANGE UP
NRBC # BLD: 0 /100 WBCS — SIGNIFICANT CHANGE UP (ref 0–0)
NT-PROBNP SERPL-SCNC: 109 PG/ML — SIGNIFICANT CHANGE UP (ref 0–125)
PH UR: 7 — SIGNIFICANT CHANGE UP (ref 5–8)
PLATELET # BLD AUTO: 236 K/UL — SIGNIFICANT CHANGE UP (ref 150–400)
POTASSIUM SERPL-MCNC: 4.6 MMOL/L — SIGNIFICANT CHANGE UP (ref 3.5–5.3)
POTASSIUM SERPL-MCNC: 6 MMOL/L — HIGH (ref 3.5–5.3)
POTASSIUM SERPL-SCNC: 4.6 MMOL/L — SIGNIFICANT CHANGE UP (ref 3.5–5.3)
POTASSIUM SERPL-SCNC: 6 MMOL/L — HIGH (ref 3.5–5.3)
PROT SERPL-MCNC: 7.8 G/DL — SIGNIFICANT CHANGE UP (ref 6–8.3)
PROT UR-MCNC: NEGATIVE — SIGNIFICANT CHANGE UP
RBC # BLD: 3.73 M/UL — LOW (ref 3.8–5.2)
RBC # FLD: 14.6 % — HIGH (ref 10.3–14.5)
RBC CASTS # UR COMP ASSIST: SIGNIFICANT CHANGE UP /HPF (ref 0–2)
SODIUM SERPL-SCNC: 141 MMOL/L — SIGNIFICANT CHANGE UP (ref 135–145)
SODIUM SERPL-SCNC: 141 MMOL/L — SIGNIFICANT CHANGE UP (ref 135–145)
SP GR SPEC: 1 — LOW (ref 1.01–1.02)
TROPONIN I SERPL-MCNC: <0.015 NG/ML — SIGNIFICANT CHANGE UP (ref 0–0.04)
UROBILINOGEN FLD QL: NEGATIVE — SIGNIFICANT CHANGE UP
WBC # BLD: 6.52 K/UL — SIGNIFICANT CHANGE UP (ref 3.8–10.5)
WBC # FLD AUTO: 6.52 K/UL — SIGNIFICANT CHANGE UP (ref 3.8–10.5)
WBC UR QL: SIGNIFICANT CHANGE UP /HPF (ref 0–5)

## 2020-09-15 PROCEDURE — 80053 COMPREHEN METABOLIC PANEL: CPT

## 2020-09-15 PROCEDURE — 85379 FIBRIN DEGRADATION QUANT: CPT

## 2020-09-15 PROCEDURE — 99284 EMERGENCY DEPT VISIT MOD MDM: CPT

## 2020-09-15 PROCEDURE — 71046 X-RAY EXAM CHEST 2 VIEWS: CPT | Mod: 26

## 2020-09-15 PROCEDURE — 71275 CT ANGIOGRAPHY CHEST: CPT | Mod: 26

## 2020-09-15 PROCEDURE — 81001 URINALYSIS AUTO W/SCOPE: CPT

## 2020-09-15 PROCEDURE — 83880 ASSAY OF NATRIURETIC PEPTIDE: CPT

## 2020-09-15 PROCEDURE — 83735 ASSAY OF MAGNESIUM: CPT

## 2020-09-15 PROCEDURE — 71275 CT ANGIOGRAPHY CHEST: CPT

## 2020-09-15 PROCEDURE — 74177 CT ABD & PELVIS W/CONTRAST: CPT

## 2020-09-15 PROCEDURE — 85025 COMPLETE CBC W/AUTO DIFF WBC: CPT

## 2020-09-15 PROCEDURE — 99284 EMERGENCY DEPT VISIT MOD MDM: CPT | Mod: 25

## 2020-09-15 PROCEDURE — 71046 X-RAY EXAM CHEST 2 VIEWS: CPT

## 2020-09-15 PROCEDURE — 84484 ASSAY OF TROPONIN QUANT: CPT

## 2020-09-15 PROCEDURE — 87086 URINE CULTURE/COLONY COUNT: CPT

## 2020-09-15 PROCEDURE — 83690 ASSAY OF LIPASE: CPT

## 2020-09-15 PROCEDURE — 93005 ELECTROCARDIOGRAM TRACING: CPT

## 2020-09-15 PROCEDURE — 36415 COLL VENOUS BLD VENIPUNCTURE: CPT

## 2020-09-15 PROCEDURE — 80048 BASIC METABOLIC PNL TOTAL CA: CPT

## 2020-09-15 PROCEDURE — 74177 CT ABD & PELVIS W/CONTRAST: CPT | Mod: 26

## 2020-09-15 RX ORDER — IBUPROFEN 200 MG
400 TABLET ORAL ONCE
Refills: 0 | Status: COMPLETED | OUTPATIENT
Start: 2020-09-15 | End: 2020-09-15

## 2020-09-15 RX ORDER — SODIUM CHLORIDE 9 MG/ML
500 INJECTION INTRAMUSCULAR; INTRAVENOUS; SUBCUTANEOUS ONCE
Refills: 0 | Status: COMPLETED | OUTPATIENT
Start: 2020-09-15 | End: 2020-09-15

## 2020-09-15 RX ADMIN — Medication 400 MILLIGRAM(S): at 18:49

## 2020-09-15 RX ADMIN — SODIUM CHLORIDE 500 MILLILITER(S): 9 INJECTION INTRAMUSCULAR; INTRAVENOUS; SUBCUTANEOUS at 12:35

## 2020-09-15 NOTE — ED PROVIDER NOTE - NSFOLLOWUPINSTRUCTIONS_ED_ALL_ED_FT
You were seen in the emergency department for shortness of breath.     Please follow-up with your primary care doctor in the next 24-48 hours.     Please follow-up with your nephrologist as previously scheduled.     Please follow-up with a cardiologist in the next 1-2 weeks.     If you have any worsening symptoms, severe chest pain, shortness of breath, nausea or vomiting, please return to the emergency department.

## 2020-09-15 NOTE — ED ADULT NURSE NOTE - NS TRANSFER PATIENT BELONGINGS
To see uro tomorrow. Had question about US. Had CT last yr which showed nl pelvis., still needs US   Clothing

## 2020-09-15 NOTE — ED PROVIDER NOTE - PATIENT PORTAL LINK FT
You can access the FollowMyHealth Patient Portal offered by [facility] by registering at the following website: [email or web address]. By joining [employer]’s FollowMyHealth portal, you will also be able to view your health information using other applications (apps) compatible with our system.

## 2020-09-15 NOTE — ED PROVIDER NOTE - CLINICAL SUMMARY MEDICAL DECISION MAKING FREE TEXT BOX
74M presenting with worsening sob. symptoms reportedly began after episode of pneumonia. no recent weight loss and non-toxic appearing. concern for ACS, PE, CHF. will get labs, ekg, cxr. will reassess.

## 2020-09-15 NOTE — ED PROVIDER NOTE - OBJECTIVE STATEMENT
74M, pmh of anemia, CAD, stents, DM, HTN, HLD, OA, PUD, presenting with two weeks of worsening shortness of breath. patient reports worsening shortness of breath since February when she had pneumonia but it became significantly worse over the past two weeks. now reports she can no longer walk down the block without becoming short of breath but has no chest pain. approximately two weeks ago she had abdominal pain and vomiting for several days. still has some abdominal pain and urinary frequency but no pain with urination. denies fever, chest pain, diarrhea, pain or swelling of lower extremities.

## 2020-09-15 NOTE — ED PROVIDER NOTE - PROGRESS NOTE DETAILS
d-dimer elevated. will get CTA to r/o PE. Lee Hill patient updated on lab and CT results. has follow-up with nephrology already scheduled. will follow-up with pcp. signed out to Dr. Finnegan pending repeat BMP. latisha Hill Finnegan:  Pt received from Dr. Hill in signout--I followed up on repeat serum chemistry per signout plan and it is now no longer hemolyzed and is unremarkable.  Will discharge per signout plan.

## 2020-09-16 LAB
CULTURE RESULTS: SIGNIFICANT CHANGE UP
SPECIMEN SOURCE: SIGNIFICANT CHANGE UP

## 2020-10-16 ENCOUNTER — RX RENEWAL (OUTPATIENT)
Age: 75
End: 2020-10-16

## 2020-11-03 ENCOUNTER — APPOINTMENT (OUTPATIENT)
Dept: VASCULAR SURGERY | Facility: CLINIC | Age: 75
End: 2020-11-03

## 2020-11-10 ENCOUNTER — RX RENEWAL (OUTPATIENT)
Age: 75
End: 2020-11-10

## 2021-05-11 ENCOUNTER — APPOINTMENT (OUTPATIENT)
Dept: VASCULAR SURGERY | Facility: CLINIC | Age: 76
End: 2021-05-11
Payer: MEDICARE

## 2021-05-11 PROCEDURE — 99213 OFFICE O/P EST LOW 20 MIN: CPT

## 2021-05-11 PROCEDURE — 99072 ADDL SUPL MATRL&STAF TM PHE: CPT

## 2021-05-11 PROCEDURE — 93880 EXTRACRANIAL BILAT STUDY: CPT

## 2021-05-12 NOTE — PROCEDURE
[FreeTextEntry1] : Carotid duplex: <50% bilaterally, diminished velocity in R vertebral artery, duplex stable from previous visit

## 2021-05-12 NOTE — HISTORY OF PRESENT ILLNESS
[FreeTextEntry1] : 75yoF w/PMHx of HTN, T2DM, HLD, CAD, s/p PCI/stents, CKD, returns for surveillance of her mild ICA stenosis and R SCA stenosis based on elevated SCA velocities and abnormal R vertebral artery waveforms, but pt reports no unilateral RUE symptoms such as fatigue/pain/weakness. Pt denies dizziness, lightheadedness, hx of TIA/CVA. No neurologic deficits, no vision changes.

## 2021-05-12 NOTE — ASSESSMENT
[FreeTextEntry1] : 75yoF w/PMHx of HTN, T2DM, HLD, CAD, s/p PCI/stents, CKD, returns for surveillance of her mild ICA stenosis and R SCA stenosis based on elevated SCA velocities and abnormal R vertebral artery waveforms. Pt denies dizziness, lightheadedness, hx of TIA/CVA. No neurologic deficits, no vision changes.\par \par Pt still asymptomatic, duplex performed today to assess for any progression of her ICA/SCA stenosis.  Duplex today reveals <50% bilaterally, diminished velocity in R vertebral artery, duplex stable from previous visit. Recommend pt continue all medication and return in 1y for surveillance duplex, or sooner if symptoms related to her ICA/SCAs develop.\par \par I, Dr. Forrester, personally performed the evaluation and management (E/M) services for this established patient who presents today with (a) new problem(s)/exacerbation of (an) existing condition(s).  That E/M includes conducting the examination, assessing all new/exacerbated conditions, and establishing a new plan of care.  Today, ACP, Hai Sauceda, was here to observe my evaluation and management services for this new problem/exacerbated condition to be followed going forward.

## 2021-05-12 NOTE — PHYSICAL EXAM
[Normal Breath Sounds] : Normal breath sounds [Normal Heart Sounds] : normal heart sounds [2+] : left 2+ [No Rash or Lesion] : No rash or lesion [Alert] : alert [Calm] : calm [JVD] : no jugular venous distention  [Carotid Bruits] : no carotid bruits [Right Carotid Bruit] : no bruit heard over the right carotid [Left Carotid Bruit] : no bruit heard over the left carotid [Ankle Swelling (On Exam)] : not present [Varicose Veins Of Lower Extremities] : not present [Abdomen Tenderness] : ~T ~M No abdominal tenderness [Abdominal Bruit] : no abdominal bruit  [de-identified] : GREY, NAD, pleasant [de-identified] : NC/AT [de-identified] : supple [FreeTextEntry1] : equal radial/brachial pulses b/l [de-identified] : BS+, NT/ND [de-identified] : + FROM all 4 extremities [de-identified] : grossly intact

## 2021-05-12 NOTE — ADDENDUM
[FreeTextEntry1] : This note was written by Hai Finley, acting as a scribe for Dr. Rose Forrester.  I, Dr. Rose Forrester, have read and attest that all the information, medical decision-making, and discharge instructions within are true and accurate.\par \par I, Dr. Rose Forrester, personally performed the evaluation and management (E/M) services for this established patient who presents today with (an) existing condition(s).  That E/M includes conducting the examination, assessing all conditions, and (re)establishing/reinforcing a plan of care.  Today, my ACP, Hai Finley, was here to observe my evaluation and management services for this condition to be followed going forward.\par

## 2021-07-15 NOTE — ED PROVIDER NOTE - CPE EDP RESP NORM
Impression: Dermatochalasis of left upper eyelid: H02.834. Left. Condition: self limited, minor problem. Symptoms: will continue to monitor. Vision: vision not threatened.  Plan: see plan #1 normal...

## 2021-12-14 ENCOUNTER — RESULT REVIEW (OUTPATIENT)
Age: 76
End: 2021-12-14

## 2023-08-28 ENCOUNTER — INPATIENT (INPATIENT)
Facility: HOSPITAL | Age: 78
LOS: 3 days | Discharge: HOME | DRG: 639 | End: 2023-09-01
Attending: STUDENT IN AN ORGANIZED HEALTH CARE EDUCATION/TRAINING PROGRAM | Admitting: STUDENT IN AN ORGANIZED HEALTH CARE EDUCATION/TRAINING PROGRAM
Payer: COMMERCIAL

## 2023-08-28 VITALS
RESPIRATION RATE: 17 BRPM | SYSTOLIC BLOOD PRESSURE: 179 MMHG | DIASTOLIC BLOOD PRESSURE: 102 MMHG | WEIGHT: 139.99 LBS | HEART RATE: 115 BPM | HEIGHT: 67 IN | OXYGEN SATURATION: 99 % | TEMPERATURE: 99 F

## 2023-08-28 DIAGNOSIS — R55 SYNCOPE AND COLLAPSE: ICD-10-CM

## 2023-08-28 LAB
ALBUMIN SERPL ELPH-MCNC: 3.9 G/DL — SIGNIFICANT CHANGE UP (ref 3.5–5)
ALP SERPL-CCNC: 57 U/L — SIGNIFICANT CHANGE UP (ref 40–120)
ALT FLD-CCNC: 24 U/L DA — SIGNIFICANT CHANGE UP (ref 10–60)
ANION GAP SERPL CALC-SCNC: 4 MMOL/L — LOW (ref 5–17)
APPEARANCE UR: CLEAR — SIGNIFICANT CHANGE UP
APTT BLD: 35.3 SEC — SIGNIFICANT CHANGE UP (ref 24.5–35.6)
AST SERPL-CCNC: 75 U/L — HIGH (ref 10–40)
BACTERIA # UR AUTO: ABNORMAL /HPF
BASOPHILS # BLD AUTO: 0.06 K/UL — SIGNIFICANT CHANGE UP (ref 0–0.2)
BASOPHILS NFR BLD AUTO: 0.6 % — SIGNIFICANT CHANGE UP (ref 0–2)
BILIRUB SERPL-MCNC: 0.4 MG/DL — SIGNIFICANT CHANGE UP (ref 0.2–1.2)
BILIRUB UR-MCNC: NEGATIVE — SIGNIFICANT CHANGE UP
BUN SERPL-MCNC: 25 MG/DL — HIGH (ref 7–18)
CALCIUM SERPL-MCNC: 10 MG/DL — SIGNIFICANT CHANGE UP (ref 8.4–10.5)
CHLORIDE SERPL-SCNC: 107 MMOL/L — SIGNIFICANT CHANGE UP (ref 96–108)
CK SERPL-CCNC: 747 U/L — HIGH (ref 21–215)
CO2 SERPL-SCNC: 25 MMOL/L — SIGNIFICANT CHANGE UP (ref 22–31)
COLOR SPEC: YELLOW — SIGNIFICANT CHANGE UP
CREAT SERPL-MCNC: 1.78 MG/DL — HIGH (ref 0.5–1.3)
DIFF PNL FLD: NEGATIVE — SIGNIFICANT CHANGE UP
EGFR: 29 ML/MIN/1.73M2 — LOW
EOSINOPHIL # BLD AUTO: 0.11 K/UL — SIGNIFICANT CHANGE UP (ref 0–0.5)
EOSINOPHIL NFR BLD AUTO: 1 % — SIGNIFICANT CHANGE UP (ref 0–6)
EPI CELLS # UR: PRESENT
GLUCOSE SERPL-MCNC: 52 MG/DL — CRITICAL LOW (ref 70–99)
GLUCOSE UR QL: NEGATIVE MG/DL — SIGNIFICANT CHANGE UP
HCT VFR BLD CALC: 37.2 % — SIGNIFICANT CHANGE UP (ref 34.5–45)
HGB BLD-MCNC: 12.1 G/DL — SIGNIFICANT CHANGE UP (ref 11.5–15.5)
IMM GRANULOCYTES NFR BLD AUTO: 0.4 % — SIGNIFICANT CHANGE UP (ref 0–0.9)
INR BLD: 0.94 RATIO — SIGNIFICANT CHANGE UP (ref 0.85–1.18)
KETONES UR-MCNC: NEGATIVE MG/DL — SIGNIFICANT CHANGE UP
LACTATE SERPL-SCNC: 0.9 MMOL/L — SIGNIFICANT CHANGE UP (ref 0.7–2)
LEUKOCYTE ESTERASE UR-ACNC: NEGATIVE — SIGNIFICANT CHANGE UP
LYMPHOCYTES # BLD AUTO: 2.15 K/UL — SIGNIFICANT CHANGE UP (ref 1–3.3)
LYMPHOCYTES # BLD AUTO: 20 % — SIGNIFICANT CHANGE UP (ref 13–44)
MCHC RBC-ENTMCNC: 31.8 PG — SIGNIFICANT CHANGE UP (ref 27–34)
MCHC RBC-ENTMCNC: 32.5 GM/DL — SIGNIFICANT CHANGE UP (ref 32–36)
MCV RBC AUTO: 97.6 FL — SIGNIFICANT CHANGE UP (ref 80–100)
MONOCYTES # BLD AUTO: 0.75 K/UL — SIGNIFICANT CHANGE UP (ref 0–0.9)
MONOCYTES NFR BLD AUTO: 7 % — SIGNIFICANT CHANGE UP (ref 2–14)
NEUTROPHILS # BLD AUTO: 7.63 K/UL — HIGH (ref 1.8–7.4)
NEUTROPHILS NFR BLD AUTO: 71 % — SIGNIFICANT CHANGE UP (ref 43–77)
NITRITE UR-MCNC: NEGATIVE — SIGNIFICANT CHANGE UP
NRBC # BLD: 0 /100 WBCS — SIGNIFICANT CHANGE UP (ref 0–0)
PH UR: 5.5 — SIGNIFICANT CHANGE UP (ref 5–8)
PLATELET # BLD AUTO: 257 K/UL — SIGNIFICANT CHANGE UP (ref 150–400)
POTASSIUM SERPL-MCNC: 5.3 MMOL/L — SIGNIFICANT CHANGE UP (ref 3.5–5.3)
POTASSIUM SERPL-SCNC: 5.3 MMOL/L — SIGNIFICANT CHANGE UP (ref 3.5–5.3)
PROT SERPL-MCNC: 9.2 G/DL — HIGH (ref 6–8.3)
PROT UR-MCNC: 100 MG/DL
PROTHROM AB SERPL-ACNC: 10.7 SEC — SIGNIFICANT CHANGE UP (ref 9.5–13)
RAPID RVP RESULT: SIGNIFICANT CHANGE UP
RBC # BLD: 3.81 M/UL — SIGNIFICANT CHANGE UP (ref 3.8–5.2)
RBC # FLD: 15.6 % — HIGH (ref 10.3–14.5)
RBC CASTS # UR COMP ASSIST: 2 /HPF — SIGNIFICANT CHANGE UP (ref 0–4)
SARS-COV-2 RNA SPEC QL NAA+PROBE: SIGNIFICANT CHANGE UP
SODIUM SERPL-SCNC: 136 MMOL/L — SIGNIFICANT CHANGE UP (ref 135–145)
SP GR SPEC: 1.01 — SIGNIFICANT CHANGE UP (ref 1–1.03)
TROPONIN I, HIGH SENSITIVITY RESULT: 36.5 NG/L — SIGNIFICANT CHANGE UP
UROBILINOGEN FLD QL: 0.2 MG/DL — SIGNIFICANT CHANGE UP (ref 0.2–1)
WBC # BLD: 10.74 K/UL — HIGH (ref 3.8–10.5)
WBC # FLD AUTO: 10.74 K/UL — HIGH (ref 3.8–10.5)
WBC UR QL: 2 /HPF — SIGNIFICANT CHANGE UP (ref 0–5)

## 2023-08-28 PROCEDURE — 93010 ELECTROCARDIOGRAM REPORT: CPT

## 2023-08-28 PROCEDURE — 72170 X-RAY EXAM OF PELVIS: CPT | Mod: 26

## 2023-08-28 PROCEDURE — 71045 X-RAY EXAM CHEST 1 VIEW: CPT | Mod: 26

## 2023-08-28 PROCEDURE — 72125 CT NECK SPINE W/O DYE: CPT | Mod: 26,MA

## 2023-08-28 PROCEDURE — 99285 EMERGENCY DEPT VISIT HI MDM: CPT

## 2023-08-28 PROCEDURE — 99222 1ST HOSP IP/OBS MODERATE 55: CPT

## 2023-08-28 PROCEDURE — 70450 CT HEAD/BRAIN W/O DYE: CPT | Mod: 26,MA

## 2023-08-28 PROCEDURE — 74176 CT ABD & PELVIS W/O CONTRAST: CPT | Mod: 26,MA

## 2023-08-28 PROCEDURE — 71250 CT THORAX DX C-: CPT | Mod: 26,MA

## 2023-08-28 RX ORDER — HEPARIN SODIUM 5000 [USP'U]/ML
5000 INJECTION INTRAVENOUS; SUBCUTANEOUS EVERY 12 HOURS
Refills: 0 | Status: DISCONTINUED | OUTPATIENT
Start: 2023-08-28 | End: 2023-09-01

## 2023-08-28 RX ORDER — VALSARTAN 80 MG/1
160 TABLET ORAL DAILY
Refills: 0 | Status: DISCONTINUED | OUTPATIENT
Start: 2023-08-28 | End: 2023-08-28

## 2023-08-28 RX ORDER — HYDRALAZINE HCL 50 MG
10 TABLET ORAL ONCE
Refills: 0 | Status: COMPLETED | OUTPATIENT
Start: 2023-08-28 | End: 2023-08-28

## 2023-08-28 RX ORDER — HYDRALAZINE HCL 50 MG
10 TABLET ORAL THREE TIMES A DAY
Refills: 0 | Status: DISCONTINUED | OUTPATIENT
Start: 2023-08-28 | End: 2023-09-01

## 2023-08-28 RX ORDER — TRAZODONE HCL 50 MG
1 TABLET ORAL
Refills: 0 | DISCHARGE

## 2023-08-28 RX ORDER — LIDOCAINE 4 G/100G
1 CREAM TOPICAL ONCE
Refills: 0 | Status: COMPLETED | OUTPATIENT
Start: 2023-08-28 | End: 2023-08-28

## 2023-08-28 RX ORDER — ISOSORBIDE MONONITRATE 60 MG/1
30 TABLET, EXTENDED RELEASE ORAL DAILY
Refills: 0 | Status: DISCONTINUED | OUTPATIENT
Start: 2023-08-28 | End: 2023-09-01

## 2023-08-28 RX ORDER — ACETAMINOPHEN 500 MG
650 TABLET ORAL EVERY 6 HOURS
Refills: 0 | Status: DISCONTINUED | OUTPATIENT
Start: 2023-08-28 | End: 2023-09-01

## 2023-08-28 RX ORDER — ATORVASTATIN CALCIUM 80 MG/1
40 TABLET, FILM COATED ORAL AT BEDTIME
Refills: 0 | Status: DISCONTINUED | OUTPATIENT
Start: 2023-08-28 | End: 2023-09-01

## 2023-08-28 RX ORDER — ACETAMINOPHEN 500 MG
1000 TABLET ORAL ONCE
Refills: 0 | Status: COMPLETED | OUTPATIENT
Start: 2023-08-28 | End: 2023-08-28

## 2023-08-28 RX ORDER — METOCLOPRAMIDE HCL 10 MG
10 TABLET ORAL ONCE
Refills: 0 | Status: COMPLETED | OUTPATIENT
Start: 2023-08-28 | End: 2023-08-28

## 2023-08-28 RX ORDER — TRAZODONE HCL 50 MG
50 TABLET ORAL DAILY
Refills: 0 | Status: DISCONTINUED | OUTPATIENT
Start: 2023-08-28 | End: 2023-08-30

## 2023-08-28 RX ORDER — SODIUM CHLORIDE 9 MG/ML
1000 INJECTION INTRAMUSCULAR; INTRAVENOUS; SUBCUTANEOUS ONCE
Refills: 0 | Status: COMPLETED | OUTPATIENT
Start: 2023-08-28 | End: 2023-08-28

## 2023-08-28 RX ORDER — ALLOPURINOL 300 MG
200 TABLET ORAL DAILY
Refills: 0 | Status: DISCONTINUED | OUTPATIENT
Start: 2023-08-28 | End: 2023-08-31

## 2023-08-28 RX ADMIN — SODIUM CHLORIDE 1000 MILLILITER(S): 9 INJECTION INTRAMUSCULAR; INTRAVENOUS; SUBCUTANEOUS at 18:32

## 2023-08-28 RX ADMIN — Medication 1000 MILLIGRAM(S): at 18:32

## 2023-08-28 RX ADMIN — Medication 400 MILLIGRAM(S): at 17:27

## 2023-08-28 RX ADMIN — Medication 10 MILLIGRAM(S): at 19:48

## 2023-08-28 RX ADMIN — Medication 10 MILLIGRAM(S): at 23:14

## 2023-08-28 RX ADMIN — LIDOCAINE 1 PATCH: 4 CREAM TOPICAL at 19:48

## 2023-08-28 RX ADMIN — SODIUM CHLORIDE 1000 MILLILITER(S): 9 INJECTION INTRAMUSCULAR; INTRAVENOUS; SUBCUTANEOUS at 17:30

## 2023-08-28 NOTE — H&P ADULT - PROBLEM SELECTOR PLAN 1
- presents s/p syncopal episode at home 2 days ago, generalized body aches, and headache for the past 5 days. Pt. admits that her PO intake decreased.   - Vitals: BP 170s-158/80s > 130s after resuming her BP meds.  - Labs significant ofr a POCT 45 > , , Cr 1.78 (baseline 1.3-1.5).   - Trop neg, UA neg, RVP neg.   - CTH/C-spine/C/Ab/P wnl. CXR and Pelvis Xray wnl.   - Admitted to telemetry for syncope w/u.   - f/u TTE   - tele monitoring  - f/u orthostatic BPs   - cardio consult  - presents s/p questionable syncopal episode at home 2 days ago, generalized body aches, and headache for the past 5 days. Pt. admits that her PO intake decreased.   - Vitals: BP 170s-158/80s > 130s systolic after resuming her BP meds.  - Labs significant for a POCT 45 > , , Cr 1.78 (baseline 1.3-1.5).   - Trop neg, UA neg, RVP neg.   - CTH/C-spine/C/Ab/P wnl. CXR and Pelvis Xray wnl.   - Admitted to telemetry for syncope w/u.   - f/u TTE   - tele monitoring  - f/u orthostatic BPs

## 2023-08-28 NOTE — ED PROVIDER NOTE - NSICDXPASTMEDICALHX_GEN_ALL_CORE_FT
PAST MEDICAL HISTORY:  Anemia denies txn, diagnosed 1 year ago with GI w/u showing a stable peptic ulcer    CAD (Coronary Artery Disease)     Coronary Stent x 2 (1998, 2011)    Depression denies hospitalization/ therapy    Diabetes Mellitus Type II x 14 years    Diabetic Nephropathy was told she had "one weak kidney on ultrasound"    Diabetic Neuropathy     GERD (Gastroesophageal Reflux Disease)     Heart Attack 7/2011    HTN (Hypertension)     Hypercholesterolemia     OA (Osteoarthritis) of Knee bilateral    PUD (Peptic Ulcer Disease) denies bleed/ txn    Renal insufficiency     Sleep apnea, unspecified type

## 2023-08-28 NOTE — H&P ADULT - PROBLEM SELECTOR PLAN 4
- hx of HTN on chlorthalidone, ISMN, valsartan, hydralazine  - continued on ISMN and hydralazine, help ACE/thiazide in setting of DENIS   - f/u orthostatics

## 2023-08-28 NOTE — H&P ADULT - NSHPPHYSICALEXAM_GEN_ALL_CORE
T(C): 37.4 (08-29-23 @ 00:45), Max: 37.6 (08-28-23 @ 23:05)  HR: 99 (08-29-23 @ 00:45) (99 - 117)  BP: 135/70 (08-29-23 @ 00:45) (135/70 - 179/102)  RR: 18 (08-29-23 @ 00:45) (17 - 18)  SpO2: 98% (08-29-23 @ 00:45) (96% - 99%)    CONSTITUTIONAL: Well groomed, no apparent distress, pleasant elderly female   EYES: PERRLA and symmetric, EOMI, No conjunctival or scleral injection, non-icteric  ENMT: Oral mucosa with dry membranes.   RESP: No respiratory distress, no use of accessory muscles; CTA b/l, no WRR  CV: RRR, +S1S2, no MRG; no JVD; no peripheral edema  GI: Soft, NT, ND, no rebound, no guarding; no palpable masses; no hepatosplenomegaly; no hernia palpated  LYMPH: No cervical LAD or tenderness; no axillary LAD or tenderness; no inguinal LAD or tenderness  MSK:  Normal ROM without pain, no spinal tenderness, normal muscle strength/tone  SKIN: No rashes or ulcers noted; no subcutaneous nodules or induration palpable  NEURO: CN II-XII intact; normal reflexes in upper and lower extremities, sensation intact in upper and lower extremities b/l to light touch   PSYCH: Appropriate insight/judgment; A+O x 3, mood and affect appropriate, recent/remote memory intact

## 2023-08-28 NOTE — H&P ADULT - PROBLEM SELECTOR PLAN 8
- hx of lung disease (likely pulm fibrosis) on ofev  - not in formulary, pt not in exacerbation  - will continue to monitior

## 2023-08-28 NOTE — H&P ADULT - ATTENDING COMMENTS
74 year old woman with multiple medical problems including CAD s/p PCI, CKD, DM2, HTN, HLD brought to the ED on account of inability to get up from the floor.   She woke up to find herself on the floor 2 days ago and was on the floor for about a whole day unable to get up.  Associated viral like symptoms.    Vital Signs Last 24 Hrs  T(C): 37.4 (28 Aug 2023 19:36), Max: 37.4 (28 Aug 2023 19:36)  T(F): 99.3 (28 Aug 2023 19:36), Max: 99.3 (28 Aug 2023 19:36)  HR: 101 (28 Aug 2023 19:36) (101 - 117)  BP: 158/82 (28 Aug 2023 19:36) (158/82 - 179/102)  RR: 17 (28 Aug 2023 19:36) (17 - 17)  SpO2: 96% (28 Aug 2023 19:36) (96% - 99%)    Parameters below as of 28 Aug 2023 19:36  Patient On (Oxygen Delivery Method): room air    Labs reviewed  hypoglycemia wit glucose low 50s and 40s in the ED.  BUN/Cr - 25/ 1.78  High TP /Alb ratio - 9.2 /3.9  ALP - 75  CK - 747    CT imaging - head c-spine, chest, abdomen and pelvis  unremarkable findings    Impression   - Acute failure to thrive likely related to viral illness   - Hypoglycemia from anorexia  - DENIS   - Elevated CK  - Dehydration     A/P   - Admit to Medicine   - IVF hydration   - Hold DM medications for now  - Correct electrolyte deficits  - Repeat chemistry 74 year old woman with multiple medical problems including CAD s/p PCI, CKD, DM2, HTN, HLD brought to the ED on account of inability to get up from the floor.   She woke up to find herself on the floor 2 days ago and was on the floor for about a whole day unable to get up.  Associated viral like symptoms.    Vital Signs Last 24 Hrs  T(C): 37.4 (28 Aug 2023 19:36), Max: 37.4 (28 Aug 2023 19:36)  T(F): 99.3 (28 Aug 2023 19:36), Max: 99.3 (28 Aug 2023 19:36)  HR: 101 (28 Aug 2023 19:36) (101 - 117)  BP: 158/82 (28 Aug 2023 19:36) (158/82 - 179/102)  RR: 17 (28 Aug 2023 19:36) (17 - 17)  SpO2: 96% (28 Aug 2023 19:36) (96% - 99%)    Parameters below as of 28 Aug 2023 19:36  Patient On (Oxygen Delivery Method): room air    Labs reviewed  hypoglycemia wit glucose low 50s and 40s in the ED.  BUN/Cr - 25/ 1.78  High TP /Alb ratio - 9.2 /3.9  ALP - 75  CK - 747    CT imaging - head c-spine, chest, abdomen and pelvis  unremarkable findings    Impression   - Acute failure to thrive likely related to viral illness   - Hypoglycemia from anorexia  - DENIS   - Elevated CK  - Dehydration   - Physical debility    A/P   - Admit to Medicine   - IVF hydration   - Hold DM medications for now  - Correct electrolyte deficits  - Repeat chemistry  - PT consult 74 year old woman with multiple medical problems including CAD s/p PCI, CKD, DM2, HTN, HLD brought to the ED on account of inability to get up from the floor.   She woke up to find herself on the floor 2 days ago and was on the floor for about a whole day unable to get up.  Associated viral like symptoms.    Vital Signs Last 24 Hrs  T(C): 37.4 (28 Aug 2023 19:36), Max: 37.4 (28 Aug 2023 19:36)  T(F): 99.3 (28 Aug 2023 19:36), Max: 99.3 (28 Aug 2023 19:36)  HR: 101 (28 Aug 2023 19:36) (101 - 117)  BP: 158/82 (28 Aug 2023 19:36) (158/82 - 179/102)  RR: 17 (28 Aug 2023 19:36) (17 - 17)  SpO2: 96% (28 Aug 2023 19:36) (96% - 99%)    Parameters below as of 28 Aug 2023 19:36  Patient On (Oxygen Delivery Method): room air    Labs reviewed  hypoglycemia wit glucose low 50s and 40s in the ED.  BUN/Cr - 25/ 1.78  High TP /Alb ratio - 9.2 /3.9  ALP - 75  CK - 747    CT imaging - head c-spine, chest, abdomen and pelvis  unremarkable findings    Impression   - Acute failure to thrive likely related to viral illness   - Hypoglycemia from anorexia  - DENIS   - Elevated CK  - Dehydration   - Physical debility with generalized weakness    A/P   - Admit to Medicine   - IVF hydration   - Hold DM medications for now  - Correct electrolyte deficits  - Repeat chemistry  - PT consult

## 2023-08-28 NOTE — H&P ADULT - HISTORY OF PRESENT ILLNESS
77M PMhx HTN, CAD, Depression, Type 2 DM, anemia, lung disease, GERD presents s/p syncopal episode. at home. States that she has subjective fevers, cough, body aches, and headache for the past 5 days. She got her shingles and pneumonia vaccines 5 days ago.   Vitals: BP 170s-158/80s, 99.3 F, 96 RA  EKG: NSR   Labs: WC 10, POCT 45 > , , Cr 1.78 (baseline 1.3-1.5), trop neg, UA neg, RVP neg   CT H/C/Ab/P- No acute traumatic injury in the chest, abdomen or pelvis. CT Brain/C-spine- No acute intracranial hemorrhage, brain edema, or mass effect.No displaced calvarial fracture. No acute fracture or traumatic subluxation.No prevertebral soft tissue swelling.Degenerative changes.  CXR- no focal consolidations, effusions   Pelvis Xray grossly normal    P:    HTN diltiazem  Dep buspirone, fluoxetine, trazodone   CAD asa, statin 80, plavix  DM glimiperide, glargine 10 u bedtime   Lung disease  GERD PPI   Anemia  77M PMhx HTN, CAD, Depression, Type 2 DM, anemia, lung disease, GERD presents s/p syncopal episode at home 2 days ago, she went to bed Saturday night, and woke up on the floor the next morning (she didn't know how she got there). It took her some time to regain the strength to get up. Today, he weakness persisted, so she called her sister. States that she has subjective fevers, cough, body aches, and headache for the past 5 days. She got her shingles and pneumonia vaccines 5 days ago, and had b/l arm soreness after. Pt. admits that her PO intake decreased. No recent travel or sick contacts. Denies chest pain, sob, chills,   Vitals: BP 170s-158/80s, 99.3 F, 96 RA  EKG: NSR   Labs: WC 10, POCT 45 > , , Cr 1.78 (baseline 1.3-1.5), trop neg, UA neg, RVP neg   CT H/C/Ab/P- No acute traumatic injury in the chest, abdomen or pelvis. CT Brain/C-spine- No acute intracranial hemorrhage, brain edema, or mass effect.No displaced calvarial fracture. No acute fracture or traumatic subluxation.No prevertebral soft tissue swelling.Degenerative changes.  CXR- no focal consolidations, effusions   Pelvis Xray grossly normal    P:    HTN diltiazem  Dep buspirone, fluoxetine, trazodone   CAD asa, statin 80, plavix  DM glimiperide, glargine 10 u bedtime   Lung disease  GERD PPI   Anemia  77M PMhx HTN, CAD, Depression, Type 2 DM, CKD, pulmonary fibrosis presents s/p syncopal episode at home 2 days ago, she went to bed Saturday night, and woke up on the floor the next morning (she didn't know how she got there). It took her some time to regain the strength to get up. Today, he weakness persisted, so she called her sister. States that she has subjective fevers, cough, generalized body aches, and headache for the past 5 days. She got her shingles and pneumonia vaccines 5 days ago, and had b/l arm soreness after. Pt. admits that her PO intake decreased. No recent travel or sick contacts. Denies chest pain, sob, chills, palpitations, headache, n/v/d, abdominal pain.     Vitals: BP 170s-158/80s, 99.3 F, 96 RA  EKG: NSR   Labs: WC 10, POCT 45 > , , Cr 1.78 (baseline 1.3-1.5), trop neg, UA neg, RVP neg   CT H/C/Ab/P- No acute traumatic injury in the chest, abdomen or pelvis. CT Brain/C-spine- No acute intracranial hemorrhage, brain edema, or mass effect. No displaced calvarial fracture. No acute fracture or traumatic subluxation. No prevertebral soft tissue swelling. Degenerative changes.  CXR- no focal consolidations, effusions   Pelvis Xray grossly normal

## 2023-08-28 NOTE — H&P ADULT - ASSESSMENT
77M PMhx HTN, CAD, Depression, Type 2 DM, CKD, pulmonary fibrosis presents s/p syncopal episode at home 2 days ago, generalized body aches, and headache for the past 5 days. Pt. admits that her PO intake decreased. No recent travel or sick contacts. Vitals: BP 170s-158/80s > 130s after resuming her BP meds.  Labs significant ofr a POCT 45 > , , Cr 1.78 (baseline 1.3-1.5). Trop neg, UA neg, RVP neg. CTH/C-spine/C/Ab/P wnl. CXR and Pelvis Xray wnl. Admitted to telemetry for syncope w/u.      77M PMhx HTN, CAD, Depression, Type 2 DM, CKD, pulmonary fibrosis presents s/p syncopal episode at home 2 days ago, generalized body aches, and headache for the past 5 days. Pt. admits that her PO intake decreased. No recent travel or sick contacts. Vitals: BP 170s-158/80s > 130s after resuming her BP meds.  Labs significant ofr a POCT 45 > , , Cr 1.78 (baseline 1.3-1.5). Trop neg, UA neg, RVP neg. CTH/C-spine/C/Ab/P wnl. CXR and Pelvis Xray wnl. Admitted to telemetry for generalized weakness with questionable syncope.

## 2023-08-28 NOTE — ED PROVIDER NOTE - PROGRESS NOTE DETAILS
Lucks-DO: pt hypoglycemic on CMP, finger stick 45, pt mentating well, PO juice/food provided, pending repeat FS. Lucks-DO: FS stable, pt with DENIS, no significant traumatic injuries on exam. Will admit, discussed with hospitalist re: admission, pt and daughter agreeable with plan.

## 2023-08-28 NOTE — H&P ADULT - PROBLEM SELECTOR PLAN 5
- hx of DM on glimepiride and lantus at home  - c/w ISS, pt has episode of hypoglycemia to 45, corrected with dextrose   - f/u AM A1c

## 2023-08-28 NOTE — ED PROVIDER NOTE - OBJECTIVE STATEMENT
77 year old female with PMH HTN, HLD, anemia, "lung disease" presents with headache x 5 days. Patient reports receiving pneumonia and shingles vaccine 5 days ago, reports gradual onset bilateral headache over the past 5 days. Patient states she woke up on the ground 2 nights ago and was unable to get up, states she was on ground for ~24 hours. Patient reports chills, headache, subjective fevers, cough with productive clear sputum, rhinorrhea, and generalized weakness. Denies any chest pain, shortness of breath, abdominal pain, vomiting, diarrhea, bloody stools, black tarry stools, dysuria, headache, vision change, numbness, weakness, or rash. Patient states she takes aspirin, denies AC use. Patient ambulatory with cane at baseline. Denies any additional complaints.

## 2023-08-28 NOTE — ED PROVIDER NOTE - NSICDXPASTSURGICALHX_GEN_ALL_CORE_FT
PAST SURGICAL HISTORY:  Bowel obstruction surgical intervention    S/P cataract extraction     S/P hip replacement right    Stented coronary artery

## 2023-08-28 NOTE — H&P ADULT - NSHPREVIEWOFSYSTEMS_GEN_ALL_CORE
REVIEW OF SYSTEMS:    CONSTITUTIONAL: (+) weakness, no fevers or chills  EYES/ENT: No visual changes;  No vertigo or throat pain   NECK: No pain or stiffness  RESPIRATORY: No cough, wheezing, hemoptysis; No shortness of breath  CARDIOVASCULAR: No chest pain or palpitations  GASTROINTESTINAL: No abdominal or epigastric pain. No nausea, vomiting, or hematemesis; No diarrhea or constipation. No melena or hematochezia.  GENITOURINARY: No dysuria, frequency or hematuria  NEUROLOGICAL: No numbness or weakness  SKIN: No itching, burning, rashes, or lesions   All other review of systems is negative unless indicated above.

## 2023-08-28 NOTE — H&P ADULT - PROBLEM SELECTOR PLAN 2
- found to have DENIS on CKD Cr 1.78 (baseline 1.3-1.5)  - f/u urine lytes, renal US  - IVF and re-check in AM  - hold pt's ACE/thiazide

## 2023-08-28 NOTE — ED PROVIDER NOTE - PHYSICAL EXAMINATION
VITAL SIGNS: I have reviewed nursing notes and confirm.  CONSTITUTIONAL: non-toxic, well appearing, + airway intact, GCS 15  SKIN: no rash, no petechiae. no ecchymosis, no lacerations  EYES: PERRL, EOMI,  ENT: no hemotympanum, tongue midline,  NECK: Supple; no cervical-thoracic-lumbar spine tenderness, left paravertebral muscle tenderness throughout thoracic and lumbar spine  CARD: RRR, equal radial pulses bilaterally 2+  RESP: CTAB, no respiratory distress, no crepitus over chest wall  ABD: Soft, non-tender, non-distended  PELVIS: stable  EXT: Normal ROM x4. no bony tenderness, equal strength bilaterally  NEURO: Alert, oriented. CN2-12 intact, equal strength bilaterally, nl gait.  PSYCH: Cooperative, appropriate.

## 2023-08-28 NOTE — ED PROVIDER NOTE - CLINICAL SUMMARY MEDICAL DECISION MAKING FREE TEXT BOX
Brittany: 77 year old female with PMH HTN, HLD, anemia, "lung disease" presents with headache x 5 days. Patient reports receiving pneumonia and shingles vaccine 5 days ago, reports gradual onset bilateral headache over the past 5 days. Patient states she woke up on the ground 2 nights ago and was unable to get up, states she was on ground for ~24 hours. Patient reports chills, headache, subjective fevers, cough with productive clear sputum, rhinorrhea, and generalized weakness. Denies any chest pain, shortness of breath, abdominal pain, vomiting, diarrhea, bloody stools, black tarry stools, dysuria, headache, vision change, numbness, weakness, or rash. Patient states she takes aspirin, denies AC use. Patient ambulatory with cane at baseline. Physical exam per above. Patient febrile and tachycardic, unclear source. Will obtain labs, imaging, provide supportive treatment with dispo pending workup.

## 2023-08-29 DIAGNOSIS — F32.9 MAJOR DEPRESSIVE DISORDER, SINGLE EPISODE, UNSPECIFIED: ICD-10-CM

## 2023-08-29 DIAGNOSIS — R55 SYNCOPE AND COLLAPSE: ICD-10-CM

## 2023-08-29 DIAGNOSIS — R53.1 WEAKNESS: ICD-10-CM

## 2023-08-29 DIAGNOSIS — E11.9 TYPE 2 DIABETES MELLITUS WITHOUT COMPLICATIONS: ICD-10-CM

## 2023-08-29 DIAGNOSIS — I10 ESSENTIAL (PRIMARY) HYPERTENSION: ICD-10-CM

## 2023-08-29 DIAGNOSIS — Z02.9 ENCOUNTER FOR ADMINISTRATIVE EXAMINATIONS, UNSPECIFIED: ICD-10-CM

## 2023-08-29 DIAGNOSIS — E78.5 HYPERLIPIDEMIA, UNSPECIFIED: ICD-10-CM

## 2023-08-29 DIAGNOSIS — J84.10 PULMONARY FIBROSIS, UNSPECIFIED: ICD-10-CM

## 2023-08-29 DIAGNOSIS — E16.2 HYPOGLYCEMIA, UNSPECIFIED: ICD-10-CM

## 2023-08-29 DIAGNOSIS — Z29.9 ENCOUNTER FOR PROPHYLACTIC MEASURES, UNSPECIFIED: ICD-10-CM

## 2023-08-29 DIAGNOSIS — N17.9 ACUTE KIDNEY FAILURE, UNSPECIFIED: ICD-10-CM

## 2023-08-29 DIAGNOSIS — M10.9 GOUT, UNSPECIFIED: ICD-10-CM

## 2023-08-29 DIAGNOSIS — R56.9 UNSPECIFIED CONVULSIONS: ICD-10-CM

## 2023-08-29 DIAGNOSIS — I25.10 ATHEROSCLEROTIC HEART DISEASE OF NATIVE CORONARY ARTERY WITHOUT ANGINA PECTORIS: ICD-10-CM

## 2023-08-29 DIAGNOSIS — A49.9 BACTERIAL INFECTION, UNSPECIFIED: ICD-10-CM

## 2023-08-29 LAB
-  COAGULASE NEGATIVE STAPHYLOCOCCUS: SIGNIFICANT CHANGE UP
ANION GAP SERPL CALC-SCNC: 8 MMOL/L — SIGNIFICANT CHANGE UP (ref 5–17)
BASOPHILS # BLD AUTO: 0.08 K/UL — SIGNIFICANT CHANGE UP (ref 0–0.2)
BASOPHILS NFR BLD AUTO: 0.9 % — SIGNIFICANT CHANGE UP (ref 0–2)
BUN SERPL-MCNC: 24 MG/DL — HIGH (ref 7–18)
CALCIUM SERPL-MCNC: 9.3 MG/DL — SIGNIFICANT CHANGE UP (ref 8.4–10.5)
CHLORIDE SERPL-SCNC: 106 MMOL/L — SIGNIFICANT CHANGE UP (ref 96–108)
CK SERPL-CCNC: 635 U/L — HIGH (ref 21–215)
CO2 SERPL-SCNC: 26 MMOL/L — SIGNIFICANT CHANGE UP (ref 22–31)
CREAT ?TM UR-MCNC: 89 MG/DL — SIGNIFICANT CHANGE UP
CREAT SERPL-MCNC: 1.78 MG/DL — HIGH (ref 0.5–1.3)
CULTURE RESULTS: SIGNIFICANT CHANGE UP
EGFR: 29 ML/MIN/1.73M2 — LOW
EOSINOPHIL # BLD AUTO: 0.25 K/UL — SIGNIFICANT CHANGE UP (ref 0–0.5)
EOSINOPHIL NFR BLD AUTO: 2.8 % — SIGNIFICANT CHANGE UP (ref 0–6)
GLUCOSE BLDC GLUCOMTR-MCNC: 140 MG/DL — HIGH (ref 70–99)
GLUCOSE BLDC GLUCOMTR-MCNC: 146 MG/DL — HIGH (ref 70–99)
GLUCOSE BLDC GLUCOMTR-MCNC: 214 MG/DL — HIGH (ref 70–99)
GLUCOSE BLDC GLUCOMTR-MCNC: 233 MG/DL — HIGH (ref 70–99)
GLUCOSE BLDC GLUCOMTR-MCNC: 277 MG/DL — HIGH (ref 70–99)
GLUCOSE SERPL-MCNC: 122 MG/DL — HIGH (ref 70–99)
GRAM STN FLD: SIGNIFICANT CHANGE UP
HCT VFR BLD CALC: 34.1 % — LOW (ref 34.5–45)
HCV AB S/CO SERPL IA: 0.1 S/CO — SIGNIFICANT CHANGE UP (ref 0–0.99)
HCV AB SERPL-IMP: SIGNIFICANT CHANGE UP
HGB BLD-MCNC: 11 G/DL — LOW (ref 11.5–15.5)
IMM GRANULOCYTES NFR BLD AUTO: 0.2 % — SIGNIFICANT CHANGE UP (ref 0–0.9)
LYMPHOCYTES # BLD AUTO: 1.53 K/UL — SIGNIFICANT CHANGE UP (ref 1–3.3)
LYMPHOCYTES # BLD AUTO: 17 % — SIGNIFICANT CHANGE UP (ref 13–44)
MAGNESIUM SERPL-MCNC: 2.5 MG/DL — SIGNIFICANT CHANGE UP (ref 1.6–2.6)
MCHC RBC-ENTMCNC: 31.5 PG — SIGNIFICANT CHANGE UP (ref 27–34)
MCHC RBC-ENTMCNC: 32.3 GM/DL — SIGNIFICANT CHANGE UP (ref 32–36)
MCV RBC AUTO: 97.7 FL — SIGNIFICANT CHANGE UP (ref 80–100)
METHOD TYPE: SIGNIFICANT CHANGE UP
MONOCYTES # BLD AUTO: 0.76 K/UL — SIGNIFICANT CHANGE UP (ref 0–0.9)
MONOCYTES NFR BLD AUTO: 8.5 % — SIGNIFICANT CHANGE UP (ref 2–14)
NEUTROPHILS # BLD AUTO: 6.35 K/UL — SIGNIFICANT CHANGE UP (ref 1.8–7.4)
NEUTROPHILS NFR BLD AUTO: 70.6 % — SIGNIFICANT CHANGE UP (ref 43–77)
NRBC # BLD: 0 /100 WBCS — SIGNIFICANT CHANGE UP (ref 0–0)
OSMOLALITY UR: 450 MOS/KG — SIGNIFICANT CHANGE UP (ref 50–1200)
PHOSPHATE SERPL-MCNC: 3.2 MG/DL — SIGNIFICANT CHANGE UP (ref 2.5–4.5)
PLATELET # BLD AUTO: 238 K/UL — SIGNIFICANT CHANGE UP (ref 150–400)
POTASSIUM SERPL-MCNC: 4.2 MMOL/L — SIGNIFICANT CHANGE UP (ref 3.5–5.3)
POTASSIUM SERPL-SCNC: 4.2 MMOL/L — SIGNIFICANT CHANGE UP (ref 3.5–5.3)
RBC # BLD: 3.49 M/UL — LOW (ref 3.8–5.2)
RBC # FLD: 14.8 % — HIGH (ref 10.3–14.5)
SODIUM SERPL-SCNC: 140 MMOL/L — SIGNIFICANT CHANGE UP (ref 135–145)
SODIUM UR-SCNC: 84 MMOL/L — SIGNIFICANT CHANGE UP
SPECIMEN SOURCE: SIGNIFICANT CHANGE UP
SPECIMEN SOURCE: SIGNIFICANT CHANGE UP
WBC # BLD: 8.99 K/UL — SIGNIFICANT CHANGE UP (ref 3.8–10.5)
WBC # FLD AUTO: 8.99 K/UL — SIGNIFICANT CHANGE UP (ref 3.8–10.5)

## 2023-08-29 PROCEDURE — 99223 1ST HOSP IP/OBS HIGH 75: CPT

## 2023-08-29 PROCEDURE — 99233 SBSQ HOSP IP/OBS HIGH 50: CPT

## 2023-08-29 RX ORDER — INSULIN LISPRO 100/ML
VIAL (ML) SUBCUTANEOUS AT BEDTIME
Refills: 0 | Status: DISCONTINUED | OUTPATIENT
Start: 2023-08-29 | End: 2023-09-01

## 2023-08-29 RX ORDER — ASPIRIN/CALCIUM CARB/MAGNESIUM 324 MG
81 TABLET ORAL DAILY
Refills: 0 | Status: DISCONTINUED | OUTPATIENT
Start: 2023-08-29 | End: 2023-09-01

## 2023-08-29 RX ORDER — INSULIN LISPRO 100/ML
VIAL (ML) SUBCUTANEOUS
Refills: 0 | Status: DISCONTINUED | OUTPATIENT
Start: 2023-08-29 | End: 2023-08-29

## 2023-08-29 RX ORDER — SOD,AMMONIUM,POTASSIUM LACTATE
1 CREAM (GRAM) TOPICAL
Refills: 0 | Status: DISCONTINUED | OUTPATIENT
Start: 2023-08-29 | End: 2023-09-01

## 2023-08-29 RX ORDER — INSULIN LISPRO 100/ML
VIAL (ML) SUBCUTANEOUS
Refills: 0 | Status: DISCONTINUED | OUTPATIENT
Start: 2023-08-29 | End: 2023-09-01

## 2023-08-29 RX ORDER — INSULIN LISPRO 100/ML
VIAL (ML) SUBCUTANEOUS AT BEDTIME
Refills: 0 | Status: DISCONTINUED | OUTPATIENT
Start: 2023-08-29 | End: 2023-08-29

## 2023-08-29 RX ORDER — SODIUM CHLORIDE 9 MG/ML
1000 INJECTION, SOLUTION INTRAVENOUS
Refills: 0 | Status: DISCONTINUED | OUTPATIENT
Start: 2023-08-29 | End: 2023-08-30

## 2023-08-29 RX ADMIN — Medication 200 MILLIGRAM(S): at 12:59

## 2023-08-29 RX ADMIN — Medication 10 MILLIGRAM(S): at 19:13

## 2023-08-29 RX ADMIN — HEPARIN SODIUM 5000 UNIT(S): 5000 INJECTION INTRAVENOUS; SUBCUTANEOUS at 19:13

## 2023-08-29 RX ADMIN — Medication 3: at 13:00

## 2023-08-29 RX ADMIN — Medication 50 MILLIGRAM(S): at 14:24

## 2023-08-29 RX ADMIN — Medication 10 MILLIGRAM(S): at 21:23

## 2023-08-29 RX ADMIN — Medication 1 APPLICATION(S): at 21:24

## 2023-08-29 RX ADMIN — Medication 10 MILLIGRAM(S): at 06:37

## 2023-08-29 RX ADMIN — Medication 650 MILLIGRAM(S): at 09:27

## 2023-08-29 RX ADMIN — ISOSORBIDE MONONITRATE 30 MILLIGRAM(S): 60 TABLET, EXTENDED RELEASE ORAL at 12:58

## 2023-08-29 RX ADMIN — ATORVASTATIN CALCIUM 40 MILLIGRAM(S): 80 TABLET, FILM COATED ORAL at 21:23

## 2023-08-29 RX ADMIN — LIDOCAINE 1 PATCH: 4 CREAM TOPICAL at 19:07

## 2023-08-29 RX ADMIN — HEPARIN SODIUM 5000 UNIT(S): 5000 INJECTION INTRAVENOUS; SUBCUTANEOUS at 06:42

## 2023-08-29 NOTE — CONSULT NOTE ADULT - SUBJECTIVE AND OBJECTIVE BOX
ENDOCRINE INITIAL CONSULT NOTE:  Patient is a 77y old  Female who presents with a chief complaint of syncope (29 Aug 2023 13:34)    HPI:  77M PMhx HTN, CAD, Depression, Type 2 DM, CKD, pulmonary fibrosis presents s/p syncopal episode at home 2 days ago, she went to bed Saturday night, and woke up on the floor the next morning (she didn't know how she got there). It took her some time to regain the strength to get up. Today, he weakness persisted, so she called her sister. States that she has subjective fevers, cough, generalized body aches, and headache for the past 5 days. She got her shingles and pneumonia vaccines 5 days ago, and had b/l arm soreness after. Pt. admits that her PO intake decreased. No recent travel or sick contacts. Denies chest pain, sob, chills, palpitations, headache, n/v/d, abdominal pain.     Vitals: BP 170s-158/80s, 99.3 F, 96 RA  EKG: NSR   Labs: WC 10, POCT 45 > , , Cr 1.78 (baseline 1.3-1.5), trop neg, UA neg, RVP neg   CT H/C/Ab/P- No acute traumatic injury in the chest, abdomen or pelvis. CT Brain/C-spine- No acute intracranial hemorrhage, brain edema, or mass effect. No displaced calvarial fracture. No acute fracture or traumatic subluxation. No prevertebral soft tissue swelling. Degenerative changes.  CXR- no focal consolidations, effusions   Pelvis Xray grossly normal (28 Aug 2023 21:36)    77y Female    Diabetes History:  Diagnosis:  Home regimen:  Home fingersticks/ CGM:  Hypoglycemia events:  Retinopathy/most recent opthalmology:  Peripheral Neuropathy:  Nephropathy:  Cardiovascular disease:  Peripheral vascular disease:  Diet:  Recent A1C   PCP  Endocrinologist:    Review of systems:  Constitutional:  Constitutional: No fever, weight loss, fatigue, low energy, generalized weakness, poor appetite  Eyes: No redness, no dryness, no pain, no tearing, no gritty or stone feeling, no bulging  Cardiovascular/ Respiratory: No palpitations,, no chest pain, no shortness of breath, no exercise intolerance, no cough, no leg/ ankle swelling  Gastrointestinal: No trouble swallowing, no heart burn, no abdominal pain, no bloating, no nausea, no vomiting, no constipation, no diarrhea, no frequent bowel movements  Skin: No excessive hair growth, no hair loss, no acne, no excessive sweating, no rash, no easy bruising  Neurological: No headaches, no change in vision, no dizziness/ lightheadedness, no tremors, no numbness/ tingling in feet, no pain/ burning in feet, no trouble with balance, no muscular weakness.   Endocrine: Frequent urination, excessive urination, excessive thirst, symptoms     PAST MEDICAL & SURGICAL HISTORY:  CAD (Coronary Artery Disease)      Coronary Stent  x 2 (1998, 2011)      HTN (Hypertension)      Diabetes Mellitus Type II  x 14 years      Diabetic Neuropathy      Diabetic Nephropathy  was told she had "one weak kidney on ultrasound"      Hypercholesterolemia      OA (Osteoarthritis) of Knee  bilateral      GERD (Gastroesophageal Reflux Disease)      Anemia  denies txn, diagnosed 1 year ago with GI w/u showing a stable peptic ulcer      Depression  denies hospitalization/ therapy      PUD (Peptic Ulcer Disease)  denies bleed/ txn      Heart Attack  7/2011      Renal insufficiency      Sleep apnea, unspecified type      S/P hip replacement  right      Bowel obstruction  surgical intervention      Stented coronary artery      S/P cataract extraction          FAMILY HISTORY:  Family history of essential hypertension    Family history of diabetes mellitus        Social History:  Occupation:  Marital status/Lives with  Exercise:  Tobacco:  Alcohol:  illicit drug abuse:  Health insurance status:    MEDICATIONS  (STANDING):  allopurinol 200 milliGRAM(s) Oral daily  atorvastatin 40 milliGRAM(s) Oral at bedtime  heparin   Injectable 5000 Unit(s) SubCutaneous every 12 hours  hydrALAZINE 10 milliGRAM(s) Oral three times a day  insulin lispro (ADMELOG) corrective regimen sliding scale   SubCutaneous three times a day before meals  insulin lispro (ADMELOG) corrective regimen sliding scale   SubCutaneous at bedtime  isosorbide   mononitrate ER Tablet (IMDUR) 30 milliGRAM(s) Oral daily  lactated ringers. 1000 milliLiter(s) (100 mL/Hr) IV Continuous <Continuous>  traZODone 50 milliGRAM(s) Oral daily    MEDICATIONS  (PRN):  acetaminophen     Tablet .. 650 milliGRAM(s) Oral every 6 hours PRN Temp greater or equal to 38C (100.4F), Mild Pain (1 - 3)      Physical Examination  Vital Signs Last 24 Hrs  T(C): 37 (29 Aug 2023 13:23), Max: 37.6 (28 Aug 2023 23:05)  T(F): 98.6 (29 Aug 2023 13:23), Max: 99.7 (28 Aug 2023 23:05)  HR: 105 (29 Aug 2023 13:23) (99 - 117)  BP: 154/82 (29 Aug 2023 13:23) (134/74 - 162/93)  BP(mean): 107 (28 Aug 2023 23:05) (107 - 107)  RR: 18 (29 Aug 2023 13:23) (17 - 18)  SpO2: 97% (29 Aug 2023 13:23) (96% - 98%)    Parameters below as of 29 Aug 2023 13:23  Patient On (Oxygen Delivery Method): room air      Constitutional: No acute distress, ill- appearing, no anxious appearing, hyperkinetic, no diaphoretic  HEENT: Moist mucous membranes  Neck:  No JVD, bruits or thyromegaly, No thyroid nodules palpable, no LAD  Respiratory:  Respiratory effort normal, lungs clear to ausculation, without rales or rhonchi  Cardiovascular:  Regular heart rate, normal S1 and S2 sounds, without murmur, rub or gallop.  Gastrointestinal: Soft, non tender without hepatosplenomegaly and masses, no abdominal obesity  Extremities: Sensation intact to monofilament in feet, no cyanosis, clubbing or edema, positive pedal pulses  Neurological:  Oriented to person, place and time, No gross sensory or motor defects, visual fields intact to confrontation, normal deep tendon reflexes    Labs:                        11.0   8.99  )-----------( 238      ( 29 Aug 2023 06:10 )             34.1     08-29    140  |  106  |  24<H>  ----------------------------<  122<H>  4.2   |  26  |  1.78<H>    Ca    9.3      29 Aug 2023 06:10  Phos  3.2     08-29  Mg     2.5     08-29    TPro  9.2<H>  /  Alb  3.9  /  TBili  0.4  /  DBili  x   /  AST  75<H>  /  ALT  24  /  AlkPhos  57  08-28    CARDIAC MARKERS ( 29 Aug 2023 06:10 )  x     / x     / 635 U/L / x     / x      CARDIAC MARKERS ( 28 Aug 2023 15:00 )  x     / x     / 747 U/L / x     / x          PT/INR - ( 28 Aug 2023 16:00 )   PT: 10.7 sec;   INR: 0.94 ratio         PTT - ( 28 Aug 2023 16:00 )  PTT:35.3 sec  Urinalysis Basic - ( 29 Aug 2023 06:10 )    Color: x / Appearance: x / SG: x / pH: x  Gluc: 122 mg/dL / Ketone: x  / Bili: x / Urobili: x   Blood: x / Protein: x / Nitrite: x   Leuk Esterase: x / RBC: x / WBC x   Sq Epi: x / Non Sq Epi: x / Bacteria: x      CAPILLARY BLOOD GLUCOSE      POCT Blood Glucose.: 277 mg/dL (29 Aug 2023 12:57)  POCT Blood Glucose.: 233 mg/dL (29 Aug 2023 11:21)  POCT Blood Glucose.: 140 mg/dL (29 Aug 2023 07:47)  POCT Blood Glucose.: 90 mg/dL (28 Aug 2023 19:03)  POCT Blood Glucose.: 101 mg/dL (28 Aug 2023 18:35)  POCT Blood Glucose.: 81 mg/dL (28 Aug 2023 17:19)  POCT Blood Glucose.: 45 mg/dL (28 Aug 2023 16:22)      Radiology and diagnostic studies:      Assessment and Plan:  77M w PMhx HTN, CAD, Depression, Type 2 DM (on glimepiride and Lantus at home), CKD, pulmonary fibrosis p/w syncopal episode at home 2 days prior, generalized body aches, and headache for the past 5 days. Pt. admitted for generalized weakness and syncope workup. Pt was found to have hypoglycemia with blood sugar to 45, corrected with dextrose. Endo was consulted for hypoglycemia.     Pt with decreased oral intake for the past 5 days due to generalized weakness. Hypoglycemia likely due to decrease oral intake in the setting of generalized weakness and continuing insulin therapy despite decrease oral intake.   Blood glucose levels improved and measures in the 200s range today.    1) Type 2 diabetes:  2) Hypoglycemia      Recommendations:  Basal Insulin:   Hold off Lantus for now    Nutritional Insulin:  Hold off lispro for now    Correctional Insulin:  Normal Lispro ( Admelog) correctional scale with meals and bedtime    Oral Diabetes Medications:  Non in the hospital    follow up A1C level  Adjust insulin as needed   ENDOCRINE INITIAL CONSULT NOTE:  Patient is a 77y old  Female who presents with a chief complaint of syncope (29 Aug 2023 13:34)    HPI:  77M PMhx HTN, CAD, Depression, Type 2 DM, CKD, pulmonary fibrosis presents s/p syncopal episode at home 2 days ago, she went to bed Saturday night, and woke up on the floor the next morning (she didn't know how she got there). It took her some time to regain the strength to get up. Today, he weakness persisted, so she called her sister. States that she has subjective fevers, cough, generalized body aches, and headache for the past 5 days. She got her shingles and pneumonia vaccines 5 days ago, and had b/l arm soreness after. Pt. admits that her PO intake decreased. No recent travel or sick contacts. Denies chest pain, sob, chills, palpitations, headache, n/v/d, abdominal pain.     Vitals: BP 170s-158/80s, 99.3 F, 96 RA  EKG: NSR   Labs: WC 10, POCT 45 > , , Cr 1.78 (baseline 1.3-1.5), trop neg, UA neg, RVP neg   CT H/C/Ab/P- No acute traumatic injury in the chest, abdomen or pelvis. CT Brain/C-spine- No acute intracranial hemorrhage, brain edema, or mass effect. No displaced calvarial fracture. No acute fracture or traumatic subluxation. No prevertebral soft tissue swelling. Degenerative changes.  CXR- no focal consolidations, effusions   Pelvis Xray grossly normal (28 Aug 2023 21:36)    77y Female    Diabetes History:  Diagnosis: 20 yrs ago  Home regimen: glimepiride and lantus 92 U according to the pt  Home fingersticks/ CGM: has hypoglycemias with Blood glucose levels in the 50s with A1c 6.5  Hypoglycemia events: agrees  Retinopathy/most recent opthalmology: 2 months prior  Peripheral Neuropathy: denies  Nephropathy: pt has CKD stage 4  Cardiovascular disease: CAD  Peripheral vascular disease: denies  Diet: doesn't follow a diabetic diet  Recent A1C 6.5  PCP  Endocrinologist:    Review of systems:  Constitutional:  Constitutional: No fever, weight loss, fatigue, low energy, generalized weakness, poor appetite  Eyes: No redness, no dryness, no pain, no tearing, no gritty or stone feeling, no bulging  Cardiovascular/ Respiratory: No palpitations,, no chest pain, no shortness of breath, no exercise intolerance, no cough, no leg/ ankle swelling  Gastrointestinal: No trouble swallowing, no heart burn, no abdominal pain, no bloating, no nausea, no vomiting, no constipation, no diarrhea, no frequent bowel movements  Skin: No excessive hair growth, no hair loss, no acne, no excessive sweating, no rash, no easy bruising  Neurological: No headaches, no change in vision, no dizziness/ lightheadedness, no tremors, no numbness/ tingling in feet, no pain/ burning in feet, no trouble with balance, no muscular weakness.   Endocrine: Frequent urination, excessive urination, excessive thirst, symptoms     PAST MEDICAL & SURGICAL HISTORY:  CAD (Coronary Artery Disease)      Coronary Stent  x 2 (1998, 2011)      HTN (Hypertension)      Diabetes Mellitus Type II  x 14 years      Diabetic Neuropathy      Diabetic Nephropathy  was told she had "one weak kidney on ultrasound"      Hypercholesterolemia      OA (Osteoarthritis) of Knee  bilateral      GERD (Gastroesophageal Reflux Disease)      Anemia  denies txn, diagnosed 1 year ago with GI w/u showing a stable peptic ulcer      Depression  denies hospitalization/ therapy      PUD (Peptic Ulcer Disease)  denies bleed/ txn      Heart Attack  7/2011      Renal insufficiency      Sleep apnea, unspecified type      S/P hip replacement  right      Bowel obstruction  surgical intervention      Stented coronary artery      S/P cataract extraction          FAMILY HISTORY:  Family history of essential hypertension    Family history of diabetes mellitus        Social History:  Occupation:  Marital status/Lives with  Exercise:  Tobacco:  Alcohol:  illicit drug abuse:  Health insurance status:    MEDICATIONS  (STANDING):  allopurinol 200 milliGRAM(s) Oral daily  atorvastatin 40 milliGRAM(s) Oral at bedtime  heparin   Injectable 5000 Unit(s) SubCutaneous every 12 hours  hydrALAZINE 10 milliGRAM(s) Oral three times a day  insulin lispro (ADMELOG) corrective regimen sliding scale   SubCutaneous three times a day before meals  insulin lispro (ADMELOG) corrective regimen sliding scale   SubCutaneous at bedtime  isosorbide   mononitrate ER Tablet (IMDUR) 30 milliGRAM(s) Oral daily  lactated ringers. 1000 milliLiter(s) (100 mL/Hr) IV Continuous <Continuous>  traZODone 50 milliGRAM(s) Oral daily    MEDICATIONS  (PRN):  acetaminophen     Tablet .. 650 milliGRAM(s) Oral every 6 hours PRN Temp greater or equal to 38C (100.4F), Mild Pain (1 - 3)      Physical Examination  Vital Signs Last 24 Hrs  T(C): 37 (29 Aug 2023 13:23), Max: 37.6 (28 Aug 2023 23:05)  T(F): 98.6 (29 Aug 2023 13:23), Max: 99.7 (28 Aug 2023 23:05)  HR: 105 (29 Aug 2023 13:23) (99 - 117)  BP: 154/82 (29 Aug 2023 13:23) (134/74 - 162/93)  BP(mean): 107 (28 Aug 2023 23:05) (107 - 107)  RR: 18 (29 Aug 2023 13:23) (17 - 18)  SpO2: 97% (29 Aug 2023 13:23) (96% - 98%)    Parameters below as of 29 Aug 2023 13:23  Patient On (Oxygen Delivery Method): room air      GENERAL: NAD  HEENT: Normocephalic;  conjunctivae and sclerae clear; moist mucous membranes;   NECK : supple  CHEST/LUNG: Clear to ausculitation bilaterally with good air entry   HEART: S1 S2  regular; no murmurs, gallops or rubs  ABDOMEN: Soft, Nontender, Nondistended; Bowel sounds present  EXTREMITIES: no cyanosis; no edema; no calf tenderness  SKIN: warm and dry; no rash  NERVOUS SYSTEM:  Awake and alert; Oriented  to place, person and time ; no new deficits      Labs:                        11.0   8.99  )-----------( 238      ( 29 Aug 2023 06:10 )             34.1     08-29    140  |  106  |  24<H>  ----------------------------<  122<H>  4.2   |  26  |  1.78<H>    Ca    9.3      29 Aug 2023 06:10  Phos  3.2     08-29  Mg     2.5     08-29    TPro  9.2<H>  /  Alb  3.9  /  TBili  0.4  /  DBili  x   /  AST  75<H>  /  ALT  24  /  AlkPhos  57  08-28    CARDIAC MARKERS ( 29 Aug 2023 06:10 )  x     / x     / 635 U/L / x     / x      CARDIAC MARKERS ( 28 Aug 2023 15:00 )  x     / x     / 747 U/L / x     / x          PT/INR - ( 28 Aug 2023 16:00 )   PT: 10.7 sec;   INR: 0.94 ratio         PTT - ( 28 Aug 2023 16:00 )  PTT:35.3 sec  Urinalysis Basic - ( 29 Aug 2023 06:10 )    Color: x / Appearance: x / SG: x / pH: x  Gluc: 122 mg/dL / Ketone: x  / Bili: x / Urobili: x   Blood: x / Protein: x / Nitrite: x   Leuk Esterase: x / RBC: x / WBC x   Sq Epi: x / Non Sq Epi: x / Bacteria: x      CAPILLARY BLOOD GLUCOSE      POCT Blood Glucose.: 277 mg/dL (29 Aug 2023 12:57)  POCT Blood Glucose.: 233 mg/dL (29 Aug 2023 11:21)  POCT Blood Glucose.: 140 mg/dL (29 Aug 2023 07:47)  POCT Blood Glucose.: 90 mg/dL (28 Aug 2023 19:03)  POCT Blood Glucose.: 101 mg/dL (28 Aug 2023 18:35)  POCT Blood Glucose.: 81 mg/dL (28 Aug 2023 17:19)  POCT Blood Glucose.: 45 mg/dL (28 Aug 2023 16:22)      Radiology and diagnostic studies:      Assessment and Plan:  77M w PMhx HTN, CAD, Depression, Type 2 DM (on glimepiride and Lantus at home), CKD, pulmonary fibrosis p/w syncopal episode at home 2 days prior, generalized body aches, and headache for the past 5 days. Pt admitted for generalized weakness and syncope workup. Pt was found to have hypoglycemia with blood sugar to 45, corrected with dextrose. Endo was consulted for hypoglycemia.     Pt with decreased oral intake for the past 5 days due to generalized weakness. Pt states that she takes 92 U of lantus at bedtime glimepiride. She states that she has been having low fasting glucose levels to the 50s. She also had a hospitalization in 2018 where she also had an episode of hypoglycemia. Blood glucose levels improved and measures in the 200s range.     1) Type 2 diabetes:  2) Hypoglycemia      Recommendations:  Basal Insulin:   Hold off Lantus for now    Nutritional Insulin:  Hold off lispro for now    Correctional Insulin:  Change sliding scale to Moderate Lispro ( Admelog) correctional scale with meals and bedtime    Oral Diabetes Medications:  Non in the hospital    follow up A1C level  Adjust insulin as needed    Please, discontinue Glimepiride on discharge due to risk of hypoglycemia in the elderly population.      ENDOCRINE INITIAL CONSULT NOTE:  77 y old  Female who presents with a chief complaint of syncope (29 Aug 2023 13:34)    HPI:  77M PMhx HTN, CAD, Depression, Type 2 DM, CKD, pulmonary fibrosis presents s/p syncopal episode at home 2 days ago, she went to bed Saturday night, and woke up on the floor the next morning (she didn't know how she got there). It took her some time to regain the strength to get up. Today, he weakness persisted, so she called her sister. States that she has subjective fevers, cough, generalized body aches, and headache for the past 5 days. She got her shingles and pneumonia vaccines 5 days ago, and had b/l arm soreness after. Pt. admits that her PO intake decreased. No recent travel or sick contacts. Denies chest pain, sob, chills, palpitations, headache, n/v/d, abdominal pain.     Vitals: BP 170s-158/80s, 99.3 F, 96 RA  EKG: NSR   Labs: WC 10, POCT 45 > , , Cr 1.78 (baseline 1.3-1.5), trop neg, UA neg, RVP neg   CT H/C/Ab/P- No acute traumatic injury in the chest, abdomen or pelvis. CT Brain/C-spine- No acute intracranial hemorrhage, brain edema, or mass effect. No displaced calvarial fracture. No acute fracture or traumatic subluxation. No prevertebral soft tissue swelling. Degenerative changes.  CXR- no focal consolidations, effusions   Pelvis Xray grossly normal (28 Aug 2023 21:36)    Endocrinology is consulted for hypoglycemia in the setting of diabetes    Patient seen at the bedside alert and oriented providing history    Diabetes History:  Diagnosis: Diagnosed with type 2 DM 20 yrs ago  Home regimen: Glimepiride and lantus 92 U according to the pt ( Patient dialed the the demo insulin pen till end ( upto 60 units) and stated she injected this much  insulin  before coming to hospital  Home fingersticks: has fasting hypoglycemias with Blood glucose levels in the 50s even at home.  Hypoglycemia events: Yes frequent  Retinopathy/most recent opthalmology: 2 months prior, denies retinopathy  Peripheral Neuropathy: denies, Follows up with podiatrist  Nephropathy: pt has CKD stage 4  Cardiovascular disease: CAD  Peripheral vascular disease: denies  Diet: doesn't follow a diabetic diet  Recent A1C 6.5  PCP: See PCP for diabetes management  Endocrinologist: Has stopped going to endocrinologist for many years    Review of systems:  Constitutional: No fever, yes weight loss, no fatigue, low energy, generalized weakness, poor appetite  Cardiovascular/ Respiratory: No palpitations, no chest pain, no shortness of breath, no cough, no leg/ ankle swelling  Gastrointestinal: No abdominal pain, no bloating, no nausea, no vomiting, no constipation, no diarrhea, no frequent bowel movements  Neurological: No headaches, no change in vision, no dizziness/ lightheadedness, no tremors, yes numbness/ tingling in feet, yes pain/ burning in feet, no trouble with balance, no muscular weakness.   Endocrine: No Frequent urination, excessive urination, excessive thirst, symptoms     PAST MEDICAL & SURGICAL HISTORY:  CAD (Coronary Artery Disease)  Coronary Stent  x 2 (1998, 2011)  HTN (Hypertension)  Diabetes Mellitus Type II  Diabetic Neuropathy  Diabetic Nephropathy was told she had "one weak kidney on ultrasound"  Hypercholesterolemia  OA (Osteoarthritis) of Knee  bilateral  GERD (Gastroesophageal Reflux Disease)  Anemia  Depression  PUD (Peptic Ulcer Disease)  Heart Attack  Renal insufficienc  Sleep apnea, unspecified type  S/P hip replacement  right  Bowel obstruction  without surgical intervention  Stented coronary artery  S/P cataract extraction    Family History:  Family history of essential hypertension    Family history of diabetes mellitus in sister and father    Social History:  Tobacco: Former smoker   Alcohol: Quit drinking many years ago  illicit drug abuse: Denies    Medications (Standing):  allopurinol 200 milliGRAM(s) Oral daily  atorvastatin 40 milliGRAM(s) Oral at bedtime  heparin   Injectable 5000 Unit(s) SubCutaneous every 12 hours  hydrALAZINE 10 milliGRAM(s) Oral three times a day  insulin lispro (ADMELOG) corrective regimen sliding scale   SubCutaneous three times a day before meals  insulin lispro (ADMELOG) corrective regimen sliding scale   SubCutaneous at bedtime  isosorbide   mononitrate ER Tablet (IMDUR) 30 milliGRAM(s) Oral daily  lactated ringers. 1000 milliLiter(s) (100 mL/Hr) IV Continuous <Continuous>  traZODone 50 milliGRAM(s) Oral daily    Medications (PRN):  acetaminophen     Tablet .. 650 milliGRAM(s) Oral every 6 hours PRN Temp greater or equal to 38C (100.4F), Mild Pain (1 - 3)    Physical Examination  Vital Signs Last 24 Hrs  T(C): 37 (29 Aug 2023 13:23), Max: 37.6 (28 Aug 2023 23:05)  T(F): 98.6 (29 Aug 2023 13:23), Max: 99.7 (28 Aug 2023 23:05)  HR: 105 (29 Aug 2023 13:23) (99 - 117)  BP: 154/82 (29 Aug 2023 13:23) (134/74 - 162/93)  BP(mean): 107 (28 Aug 2023 23:05) (107 - 107)  RR: 18 (29 Aug 2023 13:23) (17 - 18)  SpO2: 97% (29 Aug 2023 13:23) (96% - 98%)    Parameters below as of 29 Aug 2023 13:23  Patient On (Oxygen Delivery Method): room air      GENERAL: NAD  HEENT: Normocephalic;  conjunctivae and sclerae clear; moist mucous membranes;   NECK : supple, no thyromegaly  CHEST/LUNG: Clear to auscultation bilaterally with good air entry   HEART: S1 S2  regular; no murmurs, gallops or rubs  ABDOMEN: Soft, Nontender, Nondistended; Bowel sounds present  EXTREMITIES: no cyanosis; no edema; no calf tenderness  SKIN: warm and dry; no rash  NERVOUS SYSTEM:  Awake and alert; Oriented  to place, person and time ; no new deficits, sensation in feet intact.       Labs:                        11.0   8.99  )-----------( 238      ( 29 Aug 2023 06:10 )             34.1     08-29  140  |  106  |  24<H>  ----------------------------<  122<H>  4.2   |  26  |  1.78<H>    Ca    9.3      29 Aug 2023 06:10  Phos  3.2     08-29  Mg     2.5     08-29  TPro  9.2<H>  /  Alb  3.9  /  TBili  0.4  /  DBili  x   /  AST  75<H>  /  ALT  24  /  AlkPhos  57  08-28    CARDIAC MARKERS ( 29 Aug 2023 06:10 )  x     / x     / 635 U/L / x     / x      CARDIAC MARKERS ( 28 Aug 2023 15:00 )  x     / x     / 747 U/L / x     / x      PT/INR - ( 28 Aug 2023 16:00 )   PT: 10.7 sec;   INR: 0.94 ratio    PTT - ( 28 Aug 2023 16:00 )  PTT:35.3 sec  Urinalysis Basic - ( 29 Aug 2023 06:10 )  Color: x / Appearance: x / SG: x / pH: x  Gluc: 122 mg/dL / Ketone: x  / Bili: x / Urobili: x   Blood: x / Protein: x / Nitrite: x   Leuk Esterase: x / RBC: x / WBC x   Sq Epi: x / Non Sq Epi: x / Bacteria: x      Labs:  Capillary Blood Glucose:  POCT Blood Glucose.: 277 mg/dL (29 Aug 2023 12:57)  POCT Blood Glucose.: 233 mg/dL (29 Aug 2023 11:21)  POCT Blood Glucose.: 140 mg/dL (29 Aug 2023 07:47)  POCT Blood Glucose.: 90 mg/dL (28 Aug 2023 19:03)  POCT Blood Glucose.: 101 mg/dL (28 Aug 2023 18:35)  POCT Blood Glucose.: 81 mg/dL (28 Aug 2023 17:19)  POCT Blood Glucose.: 45 mg/dL (28 Aug 2023 16:22)    Hemoglobin A1C, Whole Blood: 10.9 % (04.21.18 @ 09:25)    Assessment and Plan:  77M w PMhx HTN, CAD, Depression, Type 2 DM (on glimepiride and Lantus at home), CKD, pulmonary fibrosis p/w syncopal episode at home 2 days prior, generalized body aches, and headache for the past 5 days. Pt admitted for generalized weakness and syncope workup. Pt was found to have hypoglycemia with blood sugar to 45, corrected with dextrose. Endo was consulted for hypoglycemia.     1) Hypoglycemia due to sulfonylurea and insulin use  2) Type 2 diabetes:    Pt with decreased oral intake for the past 5 days due to generalized weakness. Pt states that she takes 92 U of lantus at bedtime and glimepiride twice a day. She states that she has been having low fasting glucose levels to the 50s. She also had a hospitalization in 2018 where she also had an episode of hypoglycemia. Currently the Blood glucose levels improved and measures in the 200s range.   Patient reports fair appetite    Inpatient Recommendations:  Basal Insulin:   Hold off Lantus for now    Nutritional Insulin:  Hold off lispro for now    Correctional Insulin:  Change sliding scale to Moderate Lispro ( Admelog) correctional scale with meals and bedtime    Oral Diabetes Medications:  None in the hospital    follow up A1C level    Please, discontinue Glimepiride on discharge due to risk of hypoglycemia in the elderly population.

## 2023-08-29 NOTE — PROGRESS NOTE ADULT - PROBLEM SELECTOR PLAN 8
- hx of lung disease (likely pulm fibrosis) on ofev  - not in formulary, pt not in exacerbation  - will continue to monitor - hx of gout on allopurinol  - c/w home med - hx of HLD on statin  - c.w home med

## 2023-08-29 NOTE — PROGRESS NOTE ADULT - PROBLEM SELECTOR PLAN 9
- hx of depression on trazodone  - c/w home med - hx of lung disease (likely pulm fibrosis) on ofev  - not in formulary, pt not in exacerbation  - will continue to monitor - hx of gout on allopurinol  - c/w home med

## 2023-08-29 NOTE — PROGRESS NOTE ADULT - SUBJECTIVE AND OBJECTIVE BOX
NP Note discussed with  primary attending    Patient is a 77y old  Female who presents with a chief complaint of syncope (28 Aug 2023 21:36)      INTERVAL HPI/OVERNIGHT EVENTS: no new complaints    MEDICATIONS  (STANDING):  allopurinol 200 milliGRAM(s) Oral daily  atorvastatin 40 milliGRAM(s) Oral at bedtime  heparin   Injectable 5000 Unit(s) SubCutaneous every 12 hours  hydrALAZINE 10 milliGRAM(s) Oral three times a day  insulin lispro (ADMELOG) corrective regimen sliding scale   SubCutaneous three times a day before meals  insulin lispro (ADMELOG) corrective regimen sliding scale   SubCutaneous at bedtime  isosorbide   mononitrate ER Tablet (IMDUR) 30 milliGRAM(s) Oral daily  lactated ringers. 1000 milliLiter(s) (100 mL/Hr) IV Continuous <Continuous>  traZODone 50 milliGRAM(s) Oral daily    MEDICATIONS  (PRN):  acetaminophen     Tablet .. 650 milliGRAM(s) Oral every 6 hours PRN Temp greater or equal to 38C (100.4F), Mild Pain (1 - 3)      __________________________________________________  REVIEW OF SYSTEMS:    CONSTITUTIONAL: No fever,   EYES: no acute visual disturbances  NECK: No pain or stiffness  RESPIRATORY: No cough; No shortness of breath  CARDIOVASCULAR: No chest pain, no palpitations  GASTROINTESTINAL: No pain. No nausea or vomiting; No diarrhea   NEUROLOGICAL: No headache or numbness, no tremors  MUSCULOSKELETAL: No joint pain, no muscle pain  GENITOURINARY: no dysuria, no frequency, no hesitancy  PSYCHIATRY: no depression , no anxiety  ALL OTHER  ROS negative        Vital Signs Last 24 Hrs  T(C): 37.6 (29 Aug 2023 10:35), Max: 37.6 (28 Aug 2023 23:05)  T(F): 99.7 (29 Aug 2023 10:35), Max: 99.7 (28 Aug 2023 23:05)  HR: 105 (29 Aug 2023 10:35) (99 - 117)  BP: 134/74 (29 Aug 2023 10:35) (134/74 - 179/102)  BP(mean): 107 (28 Aug 2023 23:05) (107 - 107)  RR: 18 (29 Aug 2023 10:35) (17 - 18)  SpO2: 96% (29 Aug 2023 10:35) (96% - 99%)    Parameters below as of 29 Aug 2023 10:35  Patient On (Oxygen Delivery Method): room air        ________________________________________________  PHYSICAL EXAM:  GENERAL: NAD  HEENT: Normocephalic;  conjunctivae and sclerae clear; moist mucous membranes;   NECK : supple  CHEST/LUNG: Clear to ausculitation bilaterally with good air entry   HEART: S1 S2  regular; no murmurs, gallops or rubs  ABDOMEN: Soft, Nontender, Nondistended; Bowel sounds present  EXTREMITIES: no cyanosis; no edema; no calf tenderness  SKIN: warm and dry; no rash  NERVOUS SYSTEM:  Awake and alert; Oriented  to place, person and time ; no new deficits    _________________________________________________  LABS:                        11.0   8.99  )-----------( 238      ( 29 Aug 2023 06:10 )             34.1     08-29    140  |  106  |  24<H>  ----------------------------<  122<H>  4.2   |  26  |  1.78<H>    Ca    9.3      29 Aug 2023 06:10  Phos  3.2     08-29  Mg     2.5     08-29    TPro  9.2<H>  /  Alb  3.9  /  TBili  0.4  /  DBili  x   /  AST  75<H>  /  ALT  24  /  AlkPhos  57  08-28    PT/INR - ( 28 Aug 2023 16:00 )   PT: 10.7 sec;   INR: 0.94 ratio         PTT - ( 28 Aug 2023 16:00 )  PTT:35.3 sec  Urinalysis Basic - ( 29 Aug 2023 06:10 )    Color: x / Appearance: x / SG: x / pH: x  Gluc: 122 mg/dL / Ketone: x  / Bili: x / Urobili: x   Blood: x / Protein: x / Nitrite: x   Leuk Esterase: x / RBC: x / WBC x   Sq Epi: x / Non Sq Epi: x / Bacteria: x      CAPILLARY BLOOD GLUCOSE      POCT Blood Glucose.: 233 mg/dL (29 Aug 2023 11:21)  POCT Blood Glucose.: 140 mg/dL (29 Aug 2023 07:47)  POCT Blood Glucose.: 90 mg/dL (28 Aug 2023 19:03)  POCT Blood Glucose.: 101 mg/dL (28 Aug 2023 18:35)  POCT Blood Glucose.: 81 mg/dL (28 Aug 2023 17:19)  POCT Blood Glucose.: 45 mg/dL (28 Aug 2023 16:22)        RADIOLOGY & ADDITIONAL TESTS:  < from: CT Abdomen and Pelvis No Cont (08.28.23 @ 17:14) >  IMPRESSION:  No acute traumatic injury in the chest, abdomen or pelvis.    Pulmonary fibrosis.    Nonobstructive right renal calculi. No hydronephrosis in either kidney.          < end of copied text >    Imaging Personally Reviewed:  YES    Consultant(s) Notes Reviewed:   YES    Care Discussed with Consultants :     Plan of care was discussed with patient and /or primary care giver; all questions and concerns were addressed and care was aligned with patient's wishes.

## 2023-08-29 NOTE — CONSULT NOTE ADULT - ASSESSMENT
77M PMhx HTN, CAD, Depression, Type 2 DM, CKD, pulmonary fibrosis presents s/p syncopal episode at home 2 days ago, she went to bed Saturday night, and woke up on the floor the next morning (she didn't know how she got there).    CKD 4  Prior baseline 1.3 back in 2020  currently at baseline outpatient  plan:  monitor  DASH diet  daily BMP    HTN  resume home regimen  monitor    Syncope  per primary team      Thank you for allowing me to participate in the care of your patient    For any question, call:  Cell # [phone]  Pager # [phone]  Callback # [phone]

## 2023-08-29 NOTE — PHYSICAL THERAPY INITIAL EVALUATION ADULT - LIVES WITH, PROFILE
in house with 5steps to get into it with bilateral railing. Patient stated that she is mainly homebound. FOOD/Groceries are being delivered at home/alone

## 2023-08-29 NOTE — PROGRESS NOTE ADULT - PROBLEM SELECTOR PLAN 2
- found to have DENIS on CKD Cr 1.78 (baseline 1.3-1.5)  - f/u urine lytes, renal US  - IVF   - hold pt's ACE/thiazide circumcision tray/done - Pt endorsed syncopal episode & losing consciousness & waking with slurred speech  - Orthostatic b/p  - f/u TTE  - Cardio Dr. Guardado consulted  - Neuro Dr. Rodriguez consulted - Pt endorsed syncopal episode & losing consciousness & waking with slurred speech  - Orthostatic b/p  - f/u TTE  - Cardio Dr. Guardado consulted  - Neuro Dr. Izaguirre consulted - Pt endorsed syncopal episode & losing consciousness & waking with slurred speech  - Orthostatic b/p  - CK elevated on adm [id number]  - f/u TTE  - Cardio Dr. Guardado consulted  - Neuro Dr. Izaguirre consulted - Pt endorsed syncopal episode & losing consciousness & waking with slurred speech  - Orthostatic b/p  - CTH/C-spine/C/Ab/P wnl. CXR and Pelvis Xray wnl.   - CK elevated on adm 747 trending down 635  - f/u TTE  - F/U MRI brain & EEG  - Cardio Dr. Guardado consulted  - Neuro Dr. Izaguirre consulted - presents s/p questionable syncopal episode at home 2 days ago, generalized body aches, and headache for the past 5 days. Pt. admits that her PO intake decreased.   - Vitals: BP 170s-158/80s > 130s systolic after resuming her BP meds.  - Labs significant for a POCT 45 > , , Cr 1.78 (baseline 1.3-1.5).   - Trop neg, UA neg, RVP neg.   - Admitted to telemetry for syncope w/u.   - tele monitoring  - PT recs Home PT

## 2023-08-29 NOTE — PROGRESS NOTE ADULT - PROBLEM SELECTOR PLAN 4
- hx of HTN on chlorthalidone, ISMN, valsartan, hydralazine  - continued on ISMN and hydralazine, hold ACE/thiazide in setting of DENIS   - f/u orthostatics - hx of CAD s/p stents on statin   - c/w statin - found to have DENIS on CKD Cr 1.78 (baseline 1.3-1.5)  - gentle hydration  - f/u urine lytes, & renal US  - hold pt's ACE/thiazide  - Monitor Scr

## 2023-08-29 NOTE — PROGRESS NOTE ADULT - PROBLEM SELECTOR PLAN 12
- Dispo pending cardiac work up & PT eval - Dispo pending MRI brain & cardiac work up - DVT PPX: Heparin

## 2023-08-29 NOTE — PHYSICAL THERAPY INITIAL EVALUATION ADULT - PERTINENT HX OF CURRENT PROBLEM, REHAB EVAL
77M PMhx HTN, CAD, Depression, Type 2 DM, CKD, pulmonary fibrosis presents s/p syncopal episode at home 2 days ago,

## 2023-08-29 NOTE — PROGRESS NOTE ADULT - ASSESSMENT
77M PMhx HTN, CAD, Depression, Type 2 DM, CKD, pulmonary fibrosis presents s/p syncopal episode at home 2 days ago, generalized body aches, and headache for the past 5 days. Pt. admits that her PO intake decreased. No recent travel or sick contacts. Vitals: BP 170s-158/80s > 130s after resuming her BP meds.  Labs significant ofr a POCT 45 > , , Cr 1.78 (baseline 1.3-1.5). Trop neg, UA neg, RVP neg. CTH/C-spine/C/Ab/P wnl. CXR and Pelvis Xray wnl. Admitted to telemetry for generalized weakness with questionable syncope.

## 2023-08-29 NOTE — PROGRESS NOTE ADULT - PROBLEM SELECTOR PLAN 11
- Dispo pending cardiac work up & PT eval - DVT PPX: Heparin - hx of depression on trazodone  - c/w home med

## 2023-08-29 NOTE — CONSULT NOTE ADULT - SUBJECTIVE AND OBJECTIVE BOX
***TEMPLATE ONLY***      Patient is a 77y old  Female who presents with a chief complaint of syncope (29 Aug 2023 12:27)      HPI:  77M PMhx HTN, CAD, Depression, Type 2 DM, CKD, pulmonary fibrosis presents s/p syncopal episode at home 2 days ago, she went to bed Saturday night, and woke up on the floor the next morning (she didn't know how she got there). It took her some time to regain the strength to get up. Today, he weakness persisted, so she called her sister. States that she has subjective fevers, cough, generalized body aches, and headache for the past 5 days. She got her shingles and pneumonia vaccines 5 days ago, and had b/l arm soreness after. Pt. admits that her PO intake decreased. No recent travel or sick contacts. Denies chest pain, sob, chills, palpitations, headache, n/v/d, abdominal pain.     Vitals: BP 170s-158/80s, 99.3 F, 96 RA  EKG: NSR   Labs: WC 10, POCT 45 > , , Cr 1.78 (baseline 1.3-1.5), trop neg, UA neg, RVP neg   CT H/C/Ab/P- No acute traumatic injury in the chest, abdomen or pelvis. CT Brain/C-spine- No acute intracranial hemorrhage, brain edema, or mass effect. No displaced calvarial fracture. No acute fracture or traumatic subluxation. No prevertebral soft tissue swelling. Degenerative changes.  CXR- no focal consolidations, effusions   Pelvis Xray grossly normal (28 Aug 2023 21:36)         Neurological Review of Systems:  No difficulty with language.  No vision loss or double vision.  No dizziness, vertigo or new hearing loss.  No difficulty with speech or swallowing.  No focal weakness.  No focal sensory changes.  No numbness or tingling in the bilateral lower extremities.  No difficulty with balance.  No difficulty with ambulation.        MEDICATIONS  (STANDING):  allopurinol 200 milliGRAM(s) Oral daily  atorvastatin 40 milliGRAM(s) Oral at bedtime  heparin   Injectable 5000 Unit(s) SubCutaneous every 12 hours  hydrALAZINE 10 milliGRAM(s) Oral three times a day  insulin lispro (ADMELOG) corrective regimen sliding scale   SubCutaneous three times a day before meals  insulin lispro (ADMELOG) corrective regimen sliding scale   SubCutaneous at bedtime  isosorbide   mononitrate ER Tablet (IMDUR) 30 milliGRAM(s) Oral daily  lactated ringers. 1000 milliLiter(s) (100 mL/Hr) IV Continuous <Continuous>  traZODone 50 milliGRAM(s) Oral daily    MEDICATIONS  (PRN):  acetaminophen     Tablet .. 650 milliGRAM(s) Oral every 6 hours PRN Temp greater or equal to 38C (100.4F), Mild Pain (1 - 3)    Allergies    penicillin (Swelling)    Intolerances      PAST MEDICAL & SURGICAL HISTORY:  CAD (Coronary Artery Disease)      Coronary Stent  x 2 (1998, 2011)      HTN (Hypertension)      Diabetes Mellitus Type II  x 14 years      Diabetic Neuropathy      Diabetic Nephropathy  was told she had "one weak kidney on ultrasound"      Hypercholesterolemia      OA (Osteoarthritis) of Knee  bilateral      GERD (Gastroesophageal Reflux Disease)      Anemia  denies txn, diagnosed 1 year ago with GI w/u showing a stable peptic ulcer      Depression  denies hospitalization/ therapy      PUD (Peptic Ulcer Disease)  denies bleed/ txn      Heart Attack  7/2011      Renal insufficiency      Sleep apnea, unspecified type      S/P hip replacement  right      Bowel obstruction  surgical intervention      Stented coronary artery      S/P cataract extraction        FAMILY HISTORY:  Family history of essential hypertension    Family history of diabetes mellitus      SOCIAL HISTORY: non smoker/ former smoker/ active smoker    Review of Systems:  Constitutional: No generalized weakness. No fevers or chills.                    Eyes, Ears, Mouth, Throat: No vision loss   Respiratory: No shortness of breath or cough.                                Cardiovascular: No chest pain or palpitations  Gastrointestinal: No nausea or vomiting.                                         Genitourinary: No urinary incontinence or burning on urination.  Musculoskeletal: No joint pain.                                                           Dermatologic: No rash.  Neurological: as per HPI                                                                      Psychiatric: No behavioral problems.  Endocrine: No known hypoglycemia.               Hematologic/Lymphatic: No easy bleeding.    O:  Vital Signs Last 24 Hrs  T(C): 37 (29 Aug 2023 13:23), Max: 37.6 (28 Aug 2023 23:05)  T(F): 98.6 (29 Aug 2023 13:23), Max: 99.7 (28 Aug 2023 23:05)  HR: 105 (29 Aug 2023 13:23) (99 - 117)  BP: 154/82 (29 Aug 2023 13:23) (134/74 - 179/102)  BP(mean): 107 (28 Aug 2023 23:05) (107 - 107)  RR: 18 (29 Aug 2023 13:23) (17 - 18)  SpO2: 97% (29 Aug 2023 13:23) (96% - 99%)    Parameters below as of 29 Aug 2023 13:23  Patient On (Oxygen Delivery Method): room air        General Exam:   General appearance: No acute distress                 Cardiovascular: Pedal dorsalis pulses intact bilaterally    Mental Status: Orientated to self, date and place.  Attention intact.  No dysarthria, aphasia or neglect.  Knowledge intact.  Registration intact.  Short and long term memory grossly intact.      Cranial Nerves: CN I - not tested.  PERRL, EOMI, VFF, no nystagmus or diplopia.  No APD.  Fundi not visualized.  CN V1-3 intact to light touch and pinprick.  No facial asymmetry.  Hearing intact to finger rub bilaterally.  Tongue, uvula and palate midline.  Sternocleidomastoid and Trapezius intact bilaterally.    Motor:   Tone: normal.                  Strength intact throughout  No pronator drift bilaterally                      No dysmetria on finger-nose-finger or heel-shin-heel  No truncal ataxia.  No resting, postural or action tremor.  No myoclonus.    Sensation: intact to light touch, pinprick, vibration and proprioception    Deep Tendon Reflexes: 1+ bilateral biceps, triceps, brachioradialis, knee and ankle  Toes flexor bilaterally    Gait: normal and stable.  Rhomberg -mecca.    Other:     LABS:                        11.0   8.99  )-----------( 238      ( 29 Aug 2023 06:10 )             34.1     08-29    140  |  106  |  24<H>  ----------------------------<  122<H>  4.2   |  26  |  1.78<H>    Ca    9.3      29 Aug 2023 06:10  Phos  3.2     08-29  Mg     2.5     08-29    TPro  9.2<H>  /  Alb  3.9  /  TBili  0.4  /  DBili  x   /  AST  75<H>  /  ALT  24  /  AlkPhos  57  08-28    PT/INR - ( 28 Aug 2023 16:00 )   PT: 10.7 sec;   INR: 0.94 ratio         PTT - ( 28 Aug 2023 16:00 )  PTT:35.3 sec  Urinalysis Basic - ( 29 Aug 2023 06:10 )    Color: x / Appearance: x / SG: x / pH: x  Gluc: 122 mg/dL / Ketone: x  / Bili: x / Urobili: x   Blood: x / Protein: x / Nitrite: x   Leuk Esterase: x / RBC: x / WBC x   Sq Epi: x / Non Sq Epi: x / Bacteria: x          RADIOLOGY & ADDITIONAL STUDIES:    EKG: < from: 12 Lead ECG (09.15.20 @ 10:33) >    Ventricular Rate 72 BPM    Atrial Rate 72 BPM    P-R Interval 156 ms    QRS Duration 78 ms    Q-T Interval 378 ms    QTC Calculation(Bazett) 413 ms    P Axis 53 degrees    R Axis 18 degrees    T Axis 45 degrees    Diagnosis Line Normal sinus rhythm  Normal ECG    Confirmed by GHADA FRANKLIN, [facility] ([id number]) on 9/15/2020 11:02:41 AM    < end of copied text >  < from: CT Head No Cont (08.28.23 @ 17:13) > (images reviewed)  CT brain:  No acute intracranial hemorrhage, brain edema, or mass effect.  No displaced calvarial fracture.    CT cervical spine:  No acute fracture or traumatic subluxation.  No prevertebral soft tissue swelling.  Degenerative changes.    --- End of Report ---    < end of copied text >     Patient is a 77y old  Female who presents with a chief complaint of syncope (29 Aug 2023 12:27)      HPI:  77M PMhx HTN, CAD, Depression, Type 2 DM, CKD, pulmonary fibrosis presents s/p syncopal episode at home 2 days ago, she went to bed Saturday night, and woke up on the floor the next morning (she didn't know how she got there). It took her some time to regain the strength to get up. Today, he weakness persisted, so she called her sister. States that she has subjective fevers, cough, generalized body aches, and headache for the past 5 days. She got her shingles and pneumonia vaccines 5 days ago, and had b/l arm soreness after. Pt. admits that her PO intake decreased. No recent travel or sick contacts. Denies chest pain, sob, chills, palpitations, headache, n/v/d, abdominal pain.     Had only crackers with peanut butter to eat prior to the evening she fell.  Does not check her FS at home, takes insulin, was found to be hypoglycemic on admission.    Neurological Review of Systems:  No difficulty with language.  No vision loss or double vision.  No dizziness, vertigo or new hearing loss.  No difficulty with speech or swallowing.  No focal weakness.  No focal sensory changes.  + difficulty with balance and difficulty with ambulation.        MEDICATIONS  (STANDING):  allopurinol 200 milliGRAM(s) Oral daily  atorvastatin 40 milliGRAM(s) Oral at bedtime  heparin   Injectable 5000 Unit(s) SubCutaneous every 12 hours  hydrALAZINE 10 milliGRAM(s) Oral three times a day  insulin lispro (ADMELOG) corrective regimen sliding scale   SubCutaneous three times a day before meals  insulin lispro (ADMELOG) corrective regimen sliding scale   SubCutaneous at bedtime  isosorbide   mononitrate ER Tablet (IMDUR) 30 milliGRAM(s) Oral daily  lactated ringers. 1000 milliLiter(s) (100 mL/Hr) IV Continuous <Continuous>  traZODone 50 milliGRAM(s) Oral daily    MEDICATIONS  (PRN):  acetaminophen     Tablet .. 650 milliGRAM(s) Oral every 6 hours PRN Temp greater or equal to 38C (100.4F), Mild Pain (1 - 3)    Allergies    penicillin (Swelling)    Intolerances      PAST MEDICAL & SURGICAL HISTORY:  CAD (Coronary Artery Disease)      Coronary Stent  x 2 (1998, 2011)      HTN (Hypertension)      Diabetes Mellitus Type II  x 14 years      Diabetic Neuropathy      Diabetic Nephropathy  was told she had "one weak kidney on ultrasound"      Hypercholesterolemia      OA (Osteoarthritis) of Knee  bilateral      GERD (Gastroesophageal Reflux Disease)      Anemia  denies txn, diagnosed 1 year ago with GI w/u showing a stable peptic ulcer      Depression  denies hospitalization/ therapy      PUD (Peptic Ulcer Disease)  denies bleed/ txn      Heart Attack  7/2011      Renal insufficiency      Sleep apnea, unspecified type      S/P hip replacement  right      Bowel obstruction  surgical intervention      Stented coronary artery      S/P cataract extraction        FAMILY HISTORY:  Family history of essential hypertension    Family history of diabetes mellitus      SOCIAL HISTORY: non smoker    Review of Systems:  Constitutional: No fevers or chills.                    Eyes, Ears, Mouth, Throat: No vision loss   Respiratory: No  cough.                                Cardiovascular: No chest pain   Gastrointestinal: No vomiting.                                         Genitourinary: No urinary incontinence.  Musculoskeletal: + left side hip pain.                                                           Dermatologic: No rash.  Neurological: as per HPI                                                                      Psychiatric: No behavioral problems.  Endocrine: No known hypoglycemia.               Hematologic/Lymphatic: No easy bleeding.    O:  Vital Signs Last 24 Hrs  T(C): 37 (29 Aug 2023 13:23), Max: 37.6 (28 Aug 2023 23:05)  T(F): 98.6 (29 Aug 2023 13:23), Max: 99.7 (28 Aug 2023 23:05)  HR: 105 (29 Aug 2023 13:23) (99 - 117)  BP: 154/82 (29 Aug 2023 13:23) (134/74 - 179/102)  BP(mean): 107 (28 Aug 2023 23:05) (107 - 107)  RR: 18 (29 Aug 2023 13:23) (17 - 18)  SpO2: 97% (29 Aug 2023 13:23) (96% - 99%)    Parameters below as of 29 Aug 2023 13:23  Patient On (Oxygen Delivery Method): room air        General Exam:   General appearance: No acute distress                 Cardiovascular: Pedal dorsalis pulses intact bilaterally    Mental Status: Orientated to self, date and place.  Attention intact.  No dysarthria, aphasia or neglect.  Knowledge intact.  Registration intact.  Short and long term memory grossly intact.      Cranial Nerves: CN I - not tested.  PERRL, EOMI, VFF, no nystagmus or diplopia.  No APD.  Fundi not visualized.  CN V1-3 intact to light touch.  No facial asymmetry.  Hearing intact to finger rub bilaterally.  Tongue, uvula and palate midline.  Sternocleidomastoid and Trapezius intact bilaterally.    Motor:   Tone: normal.                  Strength intact throughout                    No dysmetria on finger-nose-finger or heel-shin-heel  No truncal ataxia.  No resting, postural or action tremor.  No myoclonus.    Sensation: intact to light touch    Deep Tendon Reflexes: 1+ bilateral biceps, triceps, brachioradialis, knee and ankle  Toes flexor bilaterally    Gait: patient unable    Other:     LABS:                        11.0   8.99  )-----------( 238      ( 29 Aug 2023 06:10 )             34.1     08-29    140  |  106  |  24<H>  ----------------------------<  122<H>  4.2   |  26  |  1.78<H>    Ca    9.3      29 Aug 2023 06:10  Phos  3.2     08-29  Mg     2.5     08-29    TPro  9.2<H>  /  Alb  3.9  /  TBili  0.4  /  DBili  x   /  AST  75<H>  /  ALT  24  /  AlkPhos  57  08-28    PT/INR - ( 28 Aug 2023 16:00 )   PT: 10.7 sec;   INR: 0.94 ratio         PTT - ( 28 Aug 2023 16:00 )  PTT:35.3 sec  Urinalysis Basic - ( 29 Aug 2023 06:10 )    Color: x / Appearance: x / SG: x / pH: x  Gluc: 122 mg/dL / Ketone: x  / Bili: x / Urobili: x   Blood: x / Protein: x / Nitrite: x   Leuk Esterase: x / RBC: x / WBC x   Sq Epi: x / Non Sq Epi: x / Bacteria: x          RADIOLOGY & ADDITIONAL STUDIES:    EKG: < from: 12 Lead ECG (09.15.20 @ 10:33) >    Ventricular Rate 72 BPM    Atrial Rate 72 BPM    P-R Interval 156 ms    QRS Duration 78 ms    Q-T Interval 378 ms    QTC Calculation(Bazett) 413 ms    P Axis 53 degrees    R Axis 18 degrees    T Axis 45 degrees    Diagnosis Line Normal sinus rhythm  Normal ECG    Confirmed by GHADA FRANKLIN SAIMA ([id number]) on 9/15/2020 11:02:41 AM    < end of copied text >  < from: CT Head No Cont (08.28.23 @ 17:13) > (images reviewed)  CT brain:  No acute intracranial hemorrhage, brain edema, or mass effect.  No displaced calvarial fracture.    CT cervical spine:  No acute fracture or traumatic subluxation.  No prevertebral soft tissue swelling.  Degenerative changes.    --- End of Report ---    < end of copied text >

## 2023-08-29 NOTE — PROGRESS NOTE ADULT - PROBLEM SELECTOR PLAN 7
- hx of gout on allopurinol  - c/w home med - hx of HLD on statin  - c.w home med - hx of DM on glimepiride and Lantus at home  - c/w ISS, pt has episode of hypoglycemia to 45, corrected with dextrose   - f/u  A1c  - Endo Dr. Mota consulted

## 2023-08-29 NOTE — PROGRESS NOTE ADULT - PROBLEM SELECTOR PLAN 3
- hx of CAD s/p stents on statin   - c/w statin - found to have DENIS on CKD Cr 1.78 (baseline 1.3-1.5)  - f/u urine lytes, renal US  - IVF   - hold pt's ACE/thiazide - found to have DENIS on CKD Cr 1.78 (baseline 1.3-1.5)  - gentle hydration  - f/u urine lytes, & renal US  - hold pt's ACE/thiazide - found to have DENIS on CKD Cr 1.78 (baseline 1.3-1.5)  - gentle hydration  - f/u urine lytes, & renal US  - hold pt's ACE/thiazide  - Monitor Scr - Pt endorsed syncopal episode & losing consciousness & waking with slurred speech  - Orthostatic b/p  - CTH/C-spine/C/Ab/P wnl. CXR and Pelvis Xray wnl.   - CK elevated on adm 747 trending down 635  - f/u TTE  - F/U MRI brain & EEG  - Cardio Dr. Guardado consulted  - Neuro Dr. Izaguirre consulted

## 2023-08-29 NOTE — CONSULT NOTE ADULT - SUBJECTIVE AND OBJECTIVE BOX
CHIEF COMPLAINT:  Patient is a 77y old  Female who presents with a chief complaint of syncope (29 Aug 2023 13:42)      ROS/SUBJECTIVE:    HPI:  77M PMhx HTN, CAD, Depression, Type 2 DM, CKD, pulmonary fibrosis presents s/p syncopal episode at home 2 days ago, she went to bed Saturday night, and woke up on the floor the next morning (she didn't know how she got there). It took her some time to regain the strength to get up. Today, he weakness persisted, so she called her sister. States that she has subjective fevers, cough, generalized body aches, and headache for the past 5 days. She got her shingles and pneumonia vaccines 5 days ago, and had b/l arm soreness after. Pt. admits that her PO intake decreased. No recent travel or sick contacts. Denies chest pain, sob, chills, palpitations, headache, n/v/d, abdominal pain.  (28 Aug 2023 21:36). Cardiology consulted for syncope       PREVIOUS DIAGNOSTIC TESTING:      ECHO: FINDINGS: 2016: EF = 55 to 60, G1DD      STRESS: FINDINGS: 2016:  Normal study; no evidence for myocardial infarction or ischemia    CATHETERIZATION: FINDINGS:   2016 with Dr. Rashid for dyspnea on exertion. Proximal LAD. CX, RCA (Prior stent) There was a tubular 10 % stenosis at a site. RPDA: There was a discrete 30 % stenosis at a site      RISK FACTORS  Pmhx:   Smoking hx:  Family hx :      MEDICATIONS  (STANDING):  allopurinol 200 milliGRAM(s) Oral daily  ammonium lactate 12% Lotion 1 Application(s) Topical two times a day  aspirin  chewable 81 milliGRAM(s) Oral daily  atorvastatin 40 milliGRAM(s) Oral at bedtime  heparin   Injectable 5000 Unit(s) SubCutaneous every 12 hours  hydrALAZINE 10 milliGRAM(s) Oral three times a day  insulin lispro (ADMELOG) corrective regimen sliding scale   SubCutaneous three times a day before meals  insulin lispro (ADMELOG) corrective regimen sliding scale   SubCutaneous at bedtime  isosorbide   mononitrate ER Tablet (IMDUR) 30 milliGRAM(s) Oral daily  lactated ringers. 1000 milliLiter(s) (100 mL/Hr) IV Continuous <Continuous>  traZODone 50 milliGRAM(s) Oral daily    MEDICATIONS  (PRN):  acetaminophen     Tablet .. 650 milliGRAM(s) Oral every 6 hours PRN Temp greater or equal to 38C (100.4F), Mild Pain (1 - 3)        PHYSICAL EXAM:  Vital Signs Last 24 Hrs  T(C): 37 (29 Aug 2023 13:23), Max: 37.6 (28 Aug 2023 23:05)  T(F): 98.6 (29 Aug 2023 13:23), Max: 99.7 (28 Aug 2023 23:05)  HR: 101 (29 Aug 2023 13:54) (99 - 117)  BP: 154/82 (29 Aug 2023 13:23) (134/74 - 162/93)  BP(mean): 107 (28 Aug 2023 23:05) (107 - 107)  RR: 18 (29 Aug 2023 13:23) (17 - 18)  SpO2: 98% (29 Aug 2023 13:54) (96% - 98%)    Parameters below as of 29 Aug 2023 13:54  Patient On (Oxygen Delivery Method): room air        Constitutional: NAD, awake and alert  HEENT: PERR, EOMI, Normal Hearing, MMM  Neck: Soft and supple, No LAD, No JVD  Respiratory: Breath sounds are clear bilaterally, No wheezing, rales or rhonchi  Cardiovascular: S1 and S2, regular rate and rhythm, no Murmurs, gallops or rubs  Gastrointestinal: Bowel Sounds present, soft, nontender, nondistended, no guarding, no rebound  Extremities: No peripheral edema  Vascular: 2+ peripheral pulses  Neurological: A/O x 3, no focal deficits  Musculoskeletal: 5/5 strength b/l upper and lower extremities  Skin: No rashes    =======================================    INTERPRETATION OF TELEMETRY:    EC NSR     ========================================    LABS:                        11.0   8.99  )-----------( 238      ( 29 Aug 2023 06:10 )             34.1     08-29    140  |  106  |  24<H>  ----------------------------<  122<H>  4.2   |  26  |  1.78<H>    Ca    9.3      29 Aug 2023 06:10  Phos  3.2     08-  Mg     2.5         TPro  9.2<H>  /  Alb  3.9  /  TBili  0.4  /  DBili  x   /  AST  75<H>  /  ALT  24  /  AlkPhos  57  08    CARDIAC MARKERS ( 29 Aug 2023 06:10 )  x     / x     / 635 U/L / x     / x      CARDIAC MARKERS ( 28 Aug 2023 15:00 )  x     / x     / 747 U/L / x     / x          PT/INR - ( 28 Aug 2023 16:00 )   PT: 10.7 sec;   INR: 0.94 ratio         PTT - ( 28 Aug 2023 16:00 )  PTT:35.3 sec  Urinalysis Basic - ( 29 Aug 2023 06:10 )    Color: x / Appearance: x / SG: x / pH: x  Gluc: 122 mg/dL / Ketone: x  / Bili: x / Urobili: x   Blood: x / Protein: x / Nitrite: x   Leuk Esterase: x / RBC: x / WBC x   Sq Epi: x / Non Sq Epi: x / Bacteria: x      I&O's Summary    BNP      RADIOLOGY & ADDITIONAL STUDIES:      Assessment and plan     #Syncope   p/w syncopal episode.   Rule out cardiac etiology, neurological or metabolic  low BG on admission 45   Trop neg  c/w Telemetry   EKG: NSR   f/u Echo   Orthostatics     #CAD   s/p stent   c/w Aspirin and simvastatin 40mg     #HTN  on chlorthalidone 25mg, ISMN 30mg, valsartan 160mg, hydralazine 10mg   c/w ISMN and hydralazine  Hold chlorthalidone and valsartan in setting of DENIS   Monitor BP       Case discussed with Cardiology Attending Dr. Guardado   PGY-2 Codie Tijerina MD  CHIEF COMPLAINT:  Patient is a 77y old  Female who presents with a chief complaint of syncope (29 Aug 2023 13:42)      ROS/SUBJECTIVE:    HPI:  77M PMhx HTN, CAD, Depression, Type 2 DM, CKD, pulmonary fibrosis presents s/p syncopal episode at home 2 days ago, she went to bed Saturday night, and woke up on the floor the next morning (she didn't know how she got there). It took her some time to regain the strength to get up. Today, he weakness persisted, so she called her sister. States that she has subjective fevers, cough, generalized body aches, and headache for the past 5 days. She got her shingles and pneumonia vaccines 5 days ago, and had b/l arm soreness after. Pt. admits that her PO intake decreased. No recent travel or sick contacts. Denies chest pain, sob, chills, palpitations, headache, n/v/d, abdominal pain.  (28 Aug 2023 21:36). Cardiology consulted for syncope     Interm HPI: Patient states she woke up on the floor, she does not recall how, however endorses generalized weakness for the past week. She denies any palpitations, chest pain. Patient has baseline shortness of breath from IPF and is on Ofev.     PREVIOUS DIAGNOSTIC TESTING:      ECHO: FINDINGS: 2016: EF = 55 to 60, G1DD    STRESS: FINDINGS: 2016:  Normal study; no evidence for myocardial infarction or ischemia    CATHETERIZATION: FINDINGS:   2016 with Dr. Rashid for dyspnea on exertion. Proximal LAD. CX, RCA (Prior stent) There was a tubular 10 % stenosis at a site. RPDA: There was a discrete 30 % stenosis at a site      RISK FACTORS  Pmhx:   Smoking hx:  Family hx :      MEDICATIONS  (STANDING):  allopurinol 200 milliGRAM(s) Oral daily  ammonium lactate 12% Lotion 1 Application(s) Topical two times a day  aspirin  chewable 81 milliGRAM(s) Oral daily  atorvastatin 40 milliGRAM(s) Oral at bedtime  heparin   Injectable 5000 Unit(s) SubCutaneous every 12 hours  hydrALAZINE 10 milliGRAM(s) Oral three times a day  insulin lispro (ADMELOG) corrective regimen sliding scale   SubCutaneous three times a day before meals  insulin lispro (ADMELOG) corrective regimen sliding scale   SubCutaneous at bedtime  isosorbide   mononitrate ER Tablet (IMDUR) 30 milliGRAM(s) Oral daily  lactated ringers. 1000 milliLiter(s) (100 mL/Hr) IV Continuous <Continuous>  traZODone 50 milliGRAM(s) Oral daily    MEDICATIONS  (PRN):  acetaminophen     Tablet .. 650 milliGRAM(s) Oral every 6 hours PRN Temp greater or equal to 38C (100.4F), Mild Pain (1 - 3)        PHYSICAL EXAM:  Vital Signs Last 24 Hrs  T(C): 37 (29 Aug 2023 13:23), Max: 37.6 (28 Aug 2023 23:05)  T(F): 98.6 (29 Aug 2023 13:23), Max: 99.7 (28 Aug 2023 23:05)  HR: 101 (29 Aug 2023 13:54) (99 - 117)  BP: 154/82 (29 Aug 2023 13:23) (134/74 - 162/93)  BP(mean): 107 (28 Aug 2023 23:05) (107 - 107)  RR: 18 (29 Aug 2023 13:23) (17 - 18)  SpO2: 98% (29 Aug 2023 13:54) (96% - 98%)    Parameters below as of 29 Aug 2023 13:54  Patient On (Oxygen Delivery Method): room air        Constitutional: NAD, awake and alert  HEENT: PERR, EOMI, Normal Hearing, MMM  Neck: Soft and supple, No LAD, No JVD  Respiratory: Breath sounds are clear bilaterally, No wheezing, rales or rhonchi  Cardiovascular: S1 and S2, regular rate and rhythm, no Murmurs, gallops or rubs  Gastrointestinal: Bowel Sounds present, soft, nontender, nondistended, no guarding, no rebound  Extremities: No peripheral edema  Vascular: 2+ peripheral pulses  Neurological: A/O x 3, no focal deficits  Musculoskeletal: 5/5 strength b/l upper and lower extremities  Skin: No rashes    =======================================    INTERPRETATION OF TELEMETRY:    EC NSR     ========================================    LABS:                        11.0   8.99  )-----------( 238      ( 29 Aug 2023 06:10 )             34.1         140  |  106  |  24<H>  ----------------------------<  122<H>  4.2   |  26  |  1.78<H>    Ca    9.3      29 Aug 2023 06:10  Phos  3.2       Mg     2.5         TPro  9.2<H>  /  Alb  3.9  /  TBili  0.4  /  DBili  x   /  AST  75<H>  /  ALT  24  /  AlkPhos  57      CARDIAC MARKERS ( 29 Aug 2023 06:10 )  x     / x     / 635 U/L / x     / x      CARDIAC MARKERS ( 28 Aug 2023 15:00 )  x     / x     / 747 U/L / x     / x          PT/INR - ( 28 Aug 2023 16:00 )   PT: 10.7 sec;   INR: 0.94 ratio         PTT - ( 28 Aug 2023 16:00 )  PTT:35.3 sec  Urinalysis Basic - ( 29 Aug 2023 06:10 )    Color: x / Appearance: x / SG: x / pH: x  Gluc: 122 mg/dL / Ketone: x  / Bili: x / Urobili: x   Blood: x / Protein: x / Nitrite: x   Leuk Esterase: x / RBC: x / WBC x   Sq Epi: x / Non Sq Epi: x / Bacteria: x      I&O's Summary    BNP      RADIOLOGY & ADDITIONAL STUDIES:      Assessment and plan     #Syncope   p/w syncopal episode.   Rule out cardiac etiology, neurological or metabolic  low BG on admission 45   Trop neg  c/w Telemetry   EKG: NSR   f/u Echo   Orthostatics     #CAD   s/p stent   c/w Aspirin and simvastatin 40mg     #HTN  on chlorthalidone 25mg, ISMN 30mg, valsartan 160mg, hydralazine 10mg   c/w ISMN and hydralazine  Hold chlorthalidone and valsartan in setting of DENIS   Monitor BP       Case discussed with Cardiology Attending Dr. Guardado   PGY-2 Codie Tijerina MD  CHIEF COMPLAINT:  Patient is a 77y old  Female who presents with a chief complaint of syncope (29 Aug 2023 13:42)      ROS/SUBJECTIVE:    HPI:  77M PMhx HTN, CAD, Depression, Type 2 DM, CKD, pulmonary fibrosis presents s/p syncopal episode at home 2 days ago, she went to bed Saturday night, and woke up on the floor the next morning (she didn't know how she got there). It took her some time to regain the strength to get up. Today, he weakness persisted, so she called her sister. States that she has subjective fevers, cough, generalized body aches, and headache for the past 5 days. She got her shingles and pneumonia vaccines 5 days ago, and had b/l arm soreness after. Pt. admits that her PO intake decreased. No recent travel or sick contacts. Denies chest pain, sob, chills, palpitations, headache, n/v/d, abdominal pain.  (28 Aug 2023 21:36). Cardiology consulted for syncope     Interm HPI: Patient states she woke up on the floor, she does not recall how, however endorses generalized weakness for the past week. She denies any palpitations, chest pain. Patient has baseline shortness of breath from IPF and is on Ofev.     PREVIOUS DIAGNOSTIC TESTING:      ECHO: FINDINGS: 2016: EF = 55 to 60, G1DD    STRESS: FINDINGS: 2016:  Normal study; no evidence for myocardial infarction or ischemia    CATHETERIZATION: FINDINGS:   2016 with Dr. Rashid for dyspnea on exertion. Proximal LAD. CX, RCA (Prior stent) There was a tubular 10 % stenosis at a site. RPDA: There was a discrete 30 % stenosis at a site      RISK FACTORS  Pmhx:   Smoking hx: quite 8 years ago   Family hx : No family hx cardiac diseases       MEDICATIONS  (STANDING):  allopurinol 200 milliGRAM(s) Oral daily  ammonium lactate 12% Lotion 1 Application(s) Topical two times a day  aspirin  chewable 81 milliGRAM(s) Oral daily  atorvastatin 40 milliGRAM(s) Oral at bedtime  heparin   Injectable 5000 Unit(s) SubCutaneous every 12 hours  hydrALAZINE 10 milliGRAM(s) Oral three times a day  insulin lispro (ADMELOG) corrective regimen sliding scale   SubCutaneous three times a day before meals  insulin lispro (ADMELOG) corrective regimen sliding scale   SubCutaneous at bedtime  isosorbide   mononitrate ER Tablet (IMDUR) 30 milliGRAM(s) Oral daily  lactated ringers. 1000 milliLiter(s) (100 mL/Hr) IV Continuous <Continuous>  traZODone 50 milliGRAM(s) Oral daily    MEDICATIONS  (PRN):  acetaminophen     Tablet .. 650 milliGRAM(s) Oral every 6 hours PRN Temp greater or equal to 38C (100.4F), Mild Pain (1 - 3)      PHYSICAL EXAM:  Vital Signs Last 24 Hrs  T(C): 37 (29 Aug 2023 13:23), Max: 37.6 (28 Aug 2023 23:05)  T(F): 98.6 (29 Aug 2023 13:23), Max: 99.7 (28 Aug 2023 23:05)  HR: 101 (29 Aug 2023 13:54) (99 - 117)  BP: 154/82 (29 Aug 2023 13:23) (134/74 - 162/93)  BP(mean): 107 (28 Aug 2023 23:05) (107 - 107)  RR: 18 (29 Aug 2023 13:23) (17 - 18)  SpO2: 98% (29 Aug 2023 13:54) (96% - 98%)    Parameters below as of 29 Aug 2023 13:54  Patient On (Oxygen Delivery Method): room air        Constitutional: NAD, awake and alert  HEENT: PERR, EOMI, Normal Hearing, MMM  Neck: Soft and supple, No LAD, No JVD  Respiratory: Breath sounds are clear bilaterally, No wheezing, rales or rhonchi  Cardiovascular: S1 and S2, tachycardic no Murmurs, gallops or rubs  Gastrointestinal: Bowel Sounds present, soft, nontender, nondistended, no guarding, no rebound  Extremities: No peripheral edema  Vascular: 2+ peripheral pulses  Neurological: A/O x 3, no focal deficits  Musculoskeletal: 5/5 strength b/l upper and lower extremities  Skin: No rashes    =======================================    INTERPRETATION OF TELEMETRY:    EC NSR     ========================================    LABS:                        11.0   8.99  )-----------( 238      ( 29 Aug 2023 06:10 )             34.1         140  |  106  |  24<H>  ----------------------------<  122<H>  4.2   |  26  |  1.78<H>    Ca    9.3      29 Aug 2023 06:10  Phos  3.2       Mg     2.5         TPro  9.2<H>  /  Alb  3.9  /  TBili  0.4  /  DBili  x   /  AST  75<H>  /  ALT  24  /  AlkPhos  57  08    CARDIAC MARKERS ( 29 Aug 2023 06:10 )  x     / x     / 635 U/L / x     / x      CARDIAC MARKERS ( 28 Aug 2023 15:00 )  x     / x     / 747 U/L / x     / x          PT/INR - ( 28 Aug 2023 16:00 )   PT: 10.7 sec;   INR: 0.94 ratio         PTT - ( 28 Aug 2023 16:00 )  PTT:35.3 sec  Urinalysis Basic - ( 29 Aug 2023 06:10 )    Color: x / Appearance: x / SG: x / pH: x  Gluc: 122 mg/dL / Ketone: x  / Bili: x / Urobili: x   Blood: x / Protein: x / Nitrite: x   Leuk Esterase: x / RBC: x / WBC x   Sq Epi: x / Non Sq Epi: x / Bacteria: x      I&O's Summary    BNP      RADIOLOGY & ADDITIONAL STUDIES:      Assessment and plan     #Syncope   p/w syncopal episode.   Rule out cardiac etiology, neurological or metabolic  low BG on admission 45   Trop neg  c/w Telemetry   EKG: NSR   f/u Echo   Orthostatics     #Sinus Tachycardia  sinus tachycardia on telemetry  r/o reversible causes like infection or pain   Ua negative, no leukocytosis   c/w telemetry     #CAD   s/p stent   c/w Aspirin and simvastatin 40mg     #HTN  on chlorthalidone 25mg, ISMN 30mg, valsartan 160mg, hydralazine 10mg   c/w ISMN and hydralazine  Hold chlorthalidone and valsartan in setting of DENIS   Monitor BP       Case discussed with Cardiology Attending Dr. Guardado   PGY-2 Codie Tijerina MD  CHIEF COMPLAINT:  Patient is a 77y old  Female who presents with a chief complaint of syncope (29 Aug 2023 13:42)      ROS/SUBJECTIVE:    HPI:  77M PMhx HTN, CAD, Depression, Type 2 DM, CKD, pulmonary fibrosis presents s/p syncopal episode at home 2 days ago, she went to bed Saturday night, and woke up on the floor the next morning (she didn't know how she got there). It took her some time to regain the strength to get up. Today, he weakness persisted, so she called her sister. States that she has subjective fevers, cough, generalized body aches, and headache for the past 5 days. She got her shingles and pneumonia vaccines 5 days ago, and had b/l arm soreness after. Pt. admits that her PO intake decreased. No recent travel or sick contacts. Denies chest pain, sob, chills, palpitations, headache, n/v/d, abdominal pain.  (28 Aug 2023 21:36). Cardiology consulted for syncope     Interm HPI: Patient states she woke up on the floor, she does not recall how, however endorses generalized weakness for the past week. She denies any palpitations, chest pain. Patient has baseline shortness of breath from IPF and is on Ofev.     PREVIOUS DIAGNOSTIC TESTING:      ECHO: FINDINGS: 2016: EF = 55 to 60, G1DD    STRESS: FINDINGS: 2016:  Normal study; no evidence for myocardial infarction or ischemia    CATHETERIZATION: FINDINGS:   2016 with Dr. Rashid for dyspnea on exertion. Proximal LAD. CX, RCA (Prior stent) There was a tubular 10 % stenosis at a site. RPDA: There was a discrete 30 % stenosis at a site      RISK FACTORS  Pmhx:   Smoking hx: quite 8 years ago   Family hx : No family hx cardiac diseases       MEDICATIONS  (STANDING):  allopurinol 200 milliGRAM(s) Oral daily  ammonium lactate 12% Lotion 1 Application(s) Topical two times a day  aspirin  chewable 81 milliGRAM(s) Oral daily  atorvastatin 40 milliGRAM(s) Oral at bedtime  heparin   Injectable 5000 Unit(s) SubCutaneous every 12 hours  hydrALAZINE 10 milliGRAM(s) Oral three times a day  insulin lispro (ADMELOG) corrective regimen sliding scale   SubCutaneous three times a day before meals  insulin lispro (ADMELOG) corrective regimen sliding scale   SubCutaneous at bedtime  isosorbide   mononitrate ER Tablet (IMDUR) 30 milliGRAM(s) Oral daily  lactated ringers. 1000 milliLiter(s) (100 mL/Hr) IV Continuous <Continuous>  traZODone 50 milliGRAM(s) Oral daily    MEDICATIONS  (PRN):  acetaminophen     Tablet .. 650 milliGRAM(s) Oral every 6 hours PRN Temp greater or equal to 38C (100.4F), Mild Pain (1 - 3)      PHYSICAL EXAM:  Vital Signs Last 24 Hrs  T(C): 37 (29 Aug 2023 13:23), Max: 37.6 (28 Aug 2023 23:05)  T(F): 98.6 (29 Aug 2023 13:23), Max: 99.7 (28 Aug 2023 23:05)  HR: 101 (29 Aug 2023 13:54) (99 - 117)  BP: 154/82 (29 Aug 2023 13:23) (134/74 - 162/93)  BP(mean): 107 (28 Aug 2023 23:05) (107 - 107)  RR: 18 (29 Aug 2023 13:23) (17 - 18)  SpO2: 98% (29 Aug 2023 13:54) (96% - 98%)    Parameters below as of 29 Aug 2023 13:54  Patient On (Oxygen Delivery Method): room air        Constitutional: NAD, awake and alert  HEENT: PERR, EOMI, Normal Hearing, MMM  Neck: Soft and supple, No LAD, No JVD  Respiratory: Breath sounds are clear bilaterally, No wheezing, rales or rhonchi  Cardiovascular: S1 and S2, tachycardic no Murmurs, gallops or rubs  Gastrointestinal: Bowel Sounds present, soft, nontender, nondistended, no guarding, no rebound  Extremities: No peripheral edema  Vascular: 2+ peripheral pulses  Neurological: A/O x 3, no focal deficits  Musculoskeletal: 5/5 strength b/l upper and lower extremities  Skin: No rashes    =======================================    INTERPRETATION OF TELEMETRY:    EC NSR     ========================================    LABS:                        11.0   8.99  )-----------( 238      ( 29 Aug 2023 06:10 )             34.1         140  |  106  |  24<H>  ----------------------------<  122<H>  4.2   |  26  |  1.78<H>    Ca    9.3      29 Aug 2023 06:10  Phos  3.2       Mg     2.5         TPro  9.2<H>  /  Alb  3.9  /  TBili  0.4  /  DBili  x   /  AST  75<H>  /  ALT  24  /  AlkPhos  57  08    CARDIAC MARKERS ( 29 Aug 2023 06:10 )  x     / x     / 635 U/L / x     / x      CARDIAC MARKERS ( 28 Aug 2023 15:00 )  x     / x     / 747 U/L / x     / x          PT/INR - ( 28 Aug 2023 16:00 )   PT: 10.7 sec;   INR: 0.94 ratio         PTT - ( 28 Aug 2023 16:00 )  PTT:35.3 sec  Urinalysis Basic - ( 29 Aug 2023 06:10 )    Color: x / Appearance: x / SG: x / pH: x  Gluc: 122 mg/dL / Ketone: x  / Bili: x / Urobili: x   Blood: x / Protein: x / Nitrite: x   Leuk Esterase: x / RBC: x / WBC x   Sq Epi: x / Non Sq Epi: x / Bacteria: x      I&O's Summary    BNP      RADIOLOGY & ADDITIONAL STUDIES:      Assessment and plan     #Syncope   p/w syncopal episode.   Rule out cardiac etiology, neurological or metabolic  low BG on admission 45   Trop neg  c/w Telemetry   EKG: NSR   f/u Echo   Orthostatics     #Sinus Tachycardia  sinus tachycardia on telemetry  r/o reversible causes like infection, pain, hyperthyroidism    Ua negative, no leukocytosis   c/w telemetry   f/u TSH, FT4     #CAD   s/p stent   c/w Aspirin and simvastatin 40mg     #HTN  on chlorthalidone 25mg, ISMN 30mg, valsartan 160mg, hydralazine 10mg   c/w ISMN and hydralazine  Hold chlorthalidone and valsartan in setting of DENIS   Monitor BP       Case discussed with Cardiology Attending Dr. Guardado   PGY-2 Codie Tijerina MD  CHIEF COMPLAINT:  Patient is a 77y old  Female who presents with a chief complaint of syncope (29 Aug 2023 13:42)      ROS/SUBJECTIVE:    HPI:  77M PMhx HTN, CAD, Depression, Type 2 DM, CKD, pulmonary fibrosis presents s/p syncopal episode at home 2 days ago, she went to bed Saturday night, and woke up on the floor the next morning (she didn't know how she got there). It took her some time to regain the strength to get up. Today, he weakness persisted, so she called her sister. States that she has subjective fevers, cough, generalized body aches, and headache for the past 5 days. She got her shingles and pneumonia vaccines 5 days ago, and had b/l arm soreness after. Pt. admits that her PO intake decreased. No recent travel or sick contacts. Denies chest pain, sob, chills, palpitations, headache, n/v/d, abdominal pain.  (28 Aug 2023 21:36). Cardiology consulted for syncope     Interm HPI: Patient states she woke up on the floor, she does not recall how, however endorses generalized weakness for the past week. She denies any palpitations, chest pain. Patient has baseline shortness of breath from IPF and is on Ofev.     PREVIOUS DIAGNOSTIC TESTING:      ECHO: FINDINGS: 2016: EF = 55 to 60 %, G1DD    STRESS: FINDINGS: 2016:  Normal study; no evidence for myocardial infarction or ischemia    CATHETERIZATION: FINDINGS:   2016 with Dr. Rashid for dyspnea on exertion. Proximal LAD. CX, RCA (Prior stent) There was a tubular 10 % stenosis at a site. RPDA: There was a discrete 30 % stenosis at a site      RISK FACTORS  Pmhx:   Smoking hx: quite 8 years ago   Family hx : No family hx cardiac diseases       MEDICATIONS  (STANDING):  allopurinol 200 milliGRAM(s) Oral daily  ammonium lactate 12% Lotion 1 Application(s) Topical two times a day  aspirin  chewable 81 milliGRAM(s) Oral daily  atorvastatin 40 milliGRAM(s) Oral at bedtime  heparin   Injectable 5000 Unit(s) SubCutaneous every 12 hours  hydrALAZINE 10 milliGRAM(s) Oral three times a day  insulin lispro (ADMELOG) corrective regimen sliding scale   SubCutaneous three times a day before meals  insulin lispro (ADMELOG) corrective regimen sliding scale   SubCutaneous at bedtime  isosorbide   mononitrate ER Tablet (IMDUR) 30 milliGRAM(s) Oral daily  lactated ringers. 1000 milliLiter(s) (100 mL/Hr) IV Continuous <Continuous>  traZODone 50 milliGRAM(s) Oral daily    MEDICATIONS  (PRN):  acetaminophen     Tablet .. 650 milliGRAM(s) Oral every 6 hours PRN Temp greater or equal to 38C (100.4F), Mild Pain (1 - 3)      PHYSICAL EXAM:  Vital Signs Last 24 Hrs  T(C): 37 (29 Aug 2023 13:23), Max: 37.6 (28 Aug 2023 23:05)  T(F): 98.6 (29 Aug 2023 13:23), Max: 99.7 (28 Aug 2023 23:05)  HR: 101 (29 Aug 2023 13:54) (99 - 117)  BP: 154/82 (29 Aug 2023 13:23) (134/74 - 162/93)  BP(mean): 107 (28 Aug 2023 23:05) (107 - 107)  RR: 18 (29 Aug 2023 13:23) (17 - 18)  SpO2: 98% (29 Aug 2023 13:54) (96% - 98%)    Parameters below as of 29 Aug 2023 13:54  Patient On (Oxygen Delivery Method): room air        Constitutional: NAD, awake and alert  HEENT: PERR, EOMI, Normal Hearing, MMM  Neck: Soft and supple, No LAD, No JVD  Respiratory: Breath sounds are clear bilaterally, No wheezing, rales or rhonchi  Cardiovascular: S1 and S2, tachycardic no Murmurs, gallops or rubs  Gastrointestinal: Bowel Sounds present, soft, nontender, nondistended, no guarding, no rebound  Extremities: No peripheral edema  Vascular: 2+ peripheral pulses  Neurological: A/O x 3, no focal deficits  Musculoskeletal: 5/5 strength b/l upper and lower extremities  Skin: No rashes    =======================================    INTERPRETATION OF TELEMETRY:    EC NSR     ========================================    LABS:                        11.0   8.99  )-----------( 238      ( 29 Aug 2023 06:10 )             34.1         140  |  106  |  24<H>  ----------------------------<  122<H>  4.2   |  26  |  1.78<H>    Ca    9.3      29 Aug 2023 06:10  Phos  3.2       Mg     2.5         TPro  9.2<H>  /  Alb  3.9  /  TBili  0.4  /  DBili  x   /  AST  75<H>  /  ALT  24  /  AlkPhos  57  08    CARDIAC MARKERS ( 29 Aug 2023 06:10 )  x     / x     / 635 U/L / x     / x      CARDIAC MARKERS ( 28 Aug 2023 15:00 )  x     / x     / 747 U/L / x     / x          PT/INR - ( 28 Aug 2023 16:00 )   PT: 10.7 sec;   INR: 0.94 ratio         PTT - ( 28 Aug 2023 16:00 )  PTT:35.3 sec  Urinalysis Basic - ( 29 Aug 2023 06:10 )    Color: x / Appearance: x / SG: x / pH: x  Gluc: 122 mg/dL / Ketone: x  / Bili: x / Urobili: x   Blood: x / Protein: x / Nitrite: x   Leuk Esterase: x / RBC: x / WBC x   Sq Epi: x / Non Sq Epi: x / Bacteria: x      I&O's Summary    BNP      RADIOLOGY & ADDITIONAL STUDIES:      Assessment and plan     #Syncope   p/w syncopal episode.   Rule out cardiac etiology, neurological or metabolic  low BG on admission 45   Trop neg  c/w Telemetry   EKG: NSR   f/u Echo, prior 2016: EF = 55 to 60 %, G1DD  Orthostatics     #Sinus Tachycardia  sinus tachycardia on telemetry  r/o reversible causes like infection, pain, hyperthyroidism    Ua negative, no leukocytosis   c/w telemetry   f/u TSH, FT4     #CAD   s/p cardiac cath on 2016 for dyspnea on exertion. Proximal LAD. CX, RCA (Prior stent) 10 % stenosis at a site. RPDA 30 % stenosis   c/w Aspirin and simvastatin 40mg     #HTN  on chlorthalidone 25mg, Isosorbide mononitrate 30mg, valsartan 160mg, hydralazine 10mg   c/w Imdur and hydralazine  Hold chlorthalidone and valsartan in setting of DENIS   Monitor BP       Case discussed with Cardiology Attending Dr. Guardado   PGY-2 Codie Tijerina MD

## 2023-08-29 NOTE — PATIENT PROFILE ADULT - FALL HARM RISK - HARM RISK INTERVENTIONS

## 2023-08-29 NOTE — CONSULT NOTE ADULT - ASSESSMENT
Likely seizure in patient with DM who does not check her FS at home on insulin and noted to be hypoglyemia which can cause seizure.  Will recommend to get MRI brain and EEG to r/o seziure foci, patient should fu with her endocrinologist for DM management given hypoglycemia.  Also, patient c/o left side hip/ pelvic pain and she should have Xrays to evaluate for fracture and orthopaedics should be consulted if fracture is detected.    yonny SANATNA and Dr. Baer

## 2023-08-29 NOTE — PROGRESS NOTE ADULT - PROBLEM SELECTOR PLAN 5
- hx of DM on glimepiride and Lantus at home  - c/w ISS, pt has episode of hypoglycemia to 45, corrected with dextrose   - f/u  A1c - hx of HTN on chlorthalidone, ISMN, valsartan, hydralazine  - continued on ISMN and hydralazine, hold ACE/thiazide in setting of DENIS   - f/u orthostatics - hx of HTN on chlorthalidone, ISMN, valsartan, hydralazine  - continued on ISorsobide and hydralazine, hold ACE/thiazide in setting of DENIS   - f/u orthostatics - hx of CAD s/p stents on statin   - c/w statin

## 2023-08-29 NOTE — CONSULT NOTE ADULT - SUBJECTIVE AND OBJECTIVE BOX
[facility] - JUAN Woodruff / JUAN Bang / DONNY Asencio/ JUAN Segura/ JUAN Thornton/ ESSENCE iMguel / JANIE Palacio / CHUCKY Aquino  -------------------------------------------------------------------------------------------------------  The patient seen and examined today.  HPI:  77M PMhx HTN, CAD, Depression, Type 2 DM, CKD, pulmonary fibrosis presents s/p syncopal episode at home 2 days ago, she went to bed Saturday night, and woke up on the floor the next morning (she didn't know how she got there). It took her some time to regain the strength to get up. Today, he weakness persisted, so she called her sister. States that she has subjective fevers, cough, generalized body aches, and headache for the past 5 days. She got her shingles and pneumonia vaccines 5 days ago, and had b/l arm soreness after. Pt. admits that her PO intake decreased. No recent travel or sick contacts. Denies chest pain, sob, chills, palpitations, headache, n/v/d, abdominal pain.       PAST MEDICAL & SURGICAL HISTORY:  CAD (Coronary Artery Disease)      Coronary Stent  x 2 (1998, 2011)      HTN (Hypertension)      Diabetes Mellitus Type II  x 14 years      Diabetic Neuropathy      Diabetic Nephropathy  was told she had "one weak kidney on ultrasound"      Hypercholesterolemia      OA (Osteoarthritis) of Knee  bilateral      GERD (Gastroesophageal Reflux Disease)      Anemia  denies txn, diagnosed 1 year ago with GI w/u showing a stable peptic ulcer      Depression  denies hospitalization/ therapy      PUD (Peptic Ulcer Disease)  denies bleed/ txn      Heart Attack  7/2011      Renal insufficiency      Sleep apnea, unspecified type      S/P hip replacement  right      Bowel obstruction  surgical intervention      Stented coronary artery      S/P cataract extraction        Allergies :- penicillin (Swelling)    Home Medications Reviewed  Hospital Medications:   MEDICATIONS  (STANDING):  allopurinol 200 milliGRAM(s) Oral daily  aspirin  chewable 81 milliGRAM(s) Oral daily  atorvastatin 40 milliGRAM(s) Oral at bedtime  heparin   Injectable 5000 Unit(s) SubCutaneous every 12 hours  hydrALAZINE 10 milliGRAM(s) Oral three times a day  insulin lispro (ADMELOG) corrective regimen sliding scale   SubCutaneous three times a day before meals  insulin lispro (ADMELOG) corrective regimen sliding scale   SubCutaneous at bedtime  isosorbide   mononitrate ER Tablet (IMDUR) 30 milliGRAM(s) Oral daily  lactated ringers. 1000 milliLiter(s) (100 mL/Hr) IV Continuous <Continuous>  traZODone 50 milliGRAM(s) Oral daily    SOCIAL HISTORY:  Denies ETOh,Smoking,   FAMILY HISTORY:  Family history of essential hypertension    Family history of diabetes mellitus        REVIEW OF SYSTEMS:  CONSTITUTIONAL: No weakness, fevers or chills  EYES/ENT: No visual changes;  No vertigo or throat pain   NECK: No pain or stiffness  RESPIRATORY: No cough, wheezing, hemoptysis; No shortness of breath  CARDIOVASCULAR: No chest pain or palpitations.  GASTROINTESTINAL: No abdominal or epigastric pain. No nausea, vomiting, or hematemesis; No diarrhea or constipation. No melena or hematochezia.  GENITOURINARY: No dysuria, frequency, foamy urine, urinary urgency, incontinence or hematuria  NEUROLOGICAL: Syncope +  SKIN: No itching, burning, rashes, or lesions   VASCULAR: No bilateral lower extremity edema.   All other review of systems is negative unless indicated above.    VITALS:  T(F): 98.6 (08-29-23 @ 13:23), Max: 99.7 (08-28-23 @ 23:05)  HR: 105 (08-29-23 @ 13:23)  BP: 154/82 (08-29-23 @ 13:23)  RR: 18 (08-29-23 @ 13:23)  SpO2: 97% (08-29-23 @ 13:23)  Wt(kg): --        PHYSICAL EXAM:  Constitutional: NAD  HEENT: anicteric sclera, oropharynx clear, MMM  Neck: supple.   Respiratory: Bilateral equal breath sounds , no wheezes, no crackles  Cardiovascular: S1, S2, Regular, Murmur present.  Gastrointestinal: Bowel Sound present, soft, NT/ND  Extremities: No cyanosis or clubbing. No peripheral edema  Neurological: Alert and oriented x 3, no focal deficits  Psychiatric: Normal mood, normal affect  : No CVA tenderness. No tenorio.   Skin: No rashes    Data:  08-29    140  |  106  |  24<H>  ----------------------------<  122<H>  4.2   |  26  |  1.78<H>    Ca    9.3      29 Aug 2023 06:10  Phos  3.2     08-29  Mg     2.5     08-29    TPro  9.2<H>  /  Alb  3.9  /  TBili  0.4  /  DBili      /  AST  75<H>  /  ALT  24  /  AlkPhos  57  08-28    Creatinine Trend: 1.78 <--, 1.78 <--                        11.0   8.99  )-----------( 238      ( 29 Aug 2023 06:10 )             34.1     Urine Studies:  Urinalysis Basic - ( 29 Aug 2023 06:10 )    Color:  / Appearance:  / SG:  / pH:   Gluc: 122 mg/dL / Ketone:   / Bili:  / Urobili:    Blood:  / Protein:  / Nitrite:    Leuk Esterase:  / RBC:  / WBC    Sq Epi:  / Non Sq Epi:  / Bacteria:       Sodium, Random Urine: 84 mmol/L (08-29 @ 07:47)  Creatinine, Random Urine: 89 mg/dL (08-29 @ 07:47)  Osmolality, Random Urine: 450 mos/kg (08-29 @ 07:47)

## 2023-08-29 NOTE — PROGRESS NOTE ADULT - PROBLEM SELECTOR PLAN 6
- hx of HLD on statin  - c.w home med - hx of DM on glimepiride and Lantus at home  - c/w ISS, pt has episode of hypoglycemia to 45, corrected with dextrose   - f/u  A1c  - Endo Dr. Mota consulted - hx of HTN on chlorthalidone, ISMN, valsartan, hydralazine  - continued on ISorsobide and hydralazine, hold ACE/thiazide in setting of DENIS   - f/u orthostatics

## 2023-08-29 NOTE — CONSULT NOTE ADULT - ATTENDING COMMENTS
I agree with the resident progress note/outlined plan of care. I have the edited the above note as needed.   My independent findings and conclusions are documented.    77-year elderly female with past medical history of diabetes, hypertension, CAD, CKD, pulmonary fibrosis admitted to hospital for syncope work-up. Endocrinology consulted for glycemic management    Patient seen at the bedside alert and oriented, patient does not have a good understanding of her diabetes regimen.      She stated that she was told to inject 90 units of the long-acting insulin at night.  She showed insulin administration techique on a demo pen provided by me.  Patient dialed the demo insulin pen till the end (60 units) and  stated that she injected this much amount or more of insulin.  Patient is also taking glimepiride 1 mg twice daily. She lives alone, eats resturant food most of the time. Reports that her sister has renal failure due to diabetes and father also had poor controlled diabetes.     She stated that before coming to hospital, she was feeling dizzy, and was confused/ unconscious in the morning when she had low blood sugar.  She also had urinary incontinence at the time of her confusion, which could be seizure due to hypoglycemia. On arrival to hospital, her BS was 45.     On review of her medical record.  Patient was admitted in 2018 for SBO.  Home regimen was  glimepiride and Lantus 10 units was mentioned in the notes. She had low blood sugars of 45 during that admission as well.  She was discharged on glimepiride and Lantus at that time.    Now patient's postprandial blood sugars are rising to 200  however fasting blood sugars are still at goal without any basal insulin since yesterday.  She does remember that her A1c was around 6 last month.    Patient has CKD therefore there are very limited options of oral antidiabetes regimen.  She cannot be discharged on metformin given her CKD.  She has hx of mulitple UTI, has received antibiotics due to symptomatic UTI, therefore SGLT2-inh may not be a good option as well to manage outpatient diabetes.  Nevertheless, she should not be discharged on sulfonylurea this time. Will decide about continuing insulin on discharge based upon inpatient BS trend and A1C. Follow the A1c level, however may be falsely low due to CKD.    Counseled patient extensively that she is taking very high units of insulin at home.  She should stop taking glimepiride due to high risk of hypoglycemia.  Discussed the complications of hypoglycemia including  seizure, stroke, and brain injury.    Rest of the plan as above.      Diabetes Education:  Extensive education about diabetes, hyperglycemia, hypoglycemia, glucose self-monitoring with glucometer, glucose target ranges, adherence to diabetes medications, diet, physical activity, importance of following up with outpatient endocrinology/ opthalmology and podiatry appointments discussed.   Explained the definition of HBA1C and target range.   Explained the complications of diabetes including stroke, renal failure and blindness  Reviewed hypoglycemic sign/symptoms and necessary precautions.   Discussed the goal fasting and postprandial BS at home  Patient verbalized understanding and agrees with the plan

## 2023-08-29 NOTE — PROGRESS NOTE ADULT - PROBLEM SELECTOR PLAN 1
- presents s/p questionable syncopal episode at home 2 days ago, generalized body aches, and headache for the past 5 days. Pt. admits that her PO intake decreased.   - Vitals: BP 170s-158/80s > 130s systolic after resuming her BP meds.  - Labs significant for a POCT 45 > , , Cr 1.78 (baseline 1.3-1.5).   - Trop neg, UA neg, RVP neg.   - CTH/C-spine/C/Ab/P wnl. CXR and Pelvis Xray wnl.   - Admitted to telemetry for syncope w/u.   - f/u TTE   - tele monitoring  - f/u orthostatic BPs  - PT eval - presents s/p questionable syncopal episode at home 2 days ago, generalized body aches, and headache for the past 5 days. Pt. admits that her PO intake decreased.   - Vitals: BP 170s-158/80s > 130s systolic after resuming her BP meds.  - Labs significant for a POCT 45 > , , Cr 1.78 (baseline 1.3-1.5).   - Trop neg, UA neg, RVP neg.   - CTH/C-spine/C/Ab/P wnl. CXR and Pelvis Xray wnl.   - Admitted to telemetry for syncope w/u.   - tele monitoring  - PT brianaal - presents s/p questionable syncopal episode at home 2 days ago, generalized body aches, and headache for the past 5 days. Pt. admits that her PO intake decreased.   - Vitals: BP 170s-158/80s > 130s systolic after resuming her BP meds.  - Labs significant for a POCT 45 > , , Cr 1.78 (baseline 1.3-1.5).   - Trop neg, UA neg, RVP neg.   - CTH/C-spine/C/Ab/P wnl. CXR and Pelvis Xray wnl.   - Admitted to telemetry for syncope w/u.   - tele monitoring  - PT recs Home PT - presents s/p questionable syncopal episode at home 2 days ago, generalized body aches, and headache for the past 5 days. Pt. admits that her PO intake decreased.   - Vitals: BP 170s-158/80s > 130s systolic after resuming her BP meds.  - Labs significant for a POCT 45 > , , Cr 1.78 (baseline 1.3-1.5).   - Trop neg, UA neg, RVP neg.   - Admitted to telemetry for syncope w/u.   - tele monitoring  - PT recs Home PT - Bcx from 8/28 growing gram + cocci in cluster  - F/U final Bcx result  - No wbc, afebrile, no identifiable source for now   - F/U ESR & Crp  - ID Dr. Rodriguez consulted

## 2023-08-29 NOTE — PROGRESS NOTE ADULT - NS ATTEND AMEND GEN_ALL_CORE FT
Patient was seen and examined at bedside. Reports she found herself on floor on Saturday morning, Does not remember how she ended up in floor but woke up with slurring of speech and was not able to walk to reach out to phone. She was on floor for 12 hours and was able to call her sister next day. She has been having headache for past 5 days. Reports occasional headache for a long time but now it is associated with dizziness. Denies any focal weakness but complains of left hip pain for sometime. Remembers palpitation before the episode. Remembers her BP was 179/72 and BS was 45. Reports being compliant with her BP meds at home     ICU Vital Signs Last 24 Hrs  T(C): 37 (29 Aug 2023 13:23), Max: 37.6 (28 Aug 2023 23:05)  T(F): 98.6 (29 Aug 2023 13:23), Max: 99.7 (28 Aug 2023 23:05)  HR: 105 (29 Aug 2023 13:23) (99 - 117)  BP: 154/82 (29 Aug 2023 13:23) (134/74 - 162/93)  BP(mean): 107 (28 Aug 2023 23:05) (107 - 107)  ABP: --  ABP(mean): --  RR: 18 (29 Aug 2023 13:23) (17 - 18)  SpO2: 97% (29 Aug 2023 13:23) (96% - 98%)    O2 Parameters below as of 29 Aug 2023 13:23  Patient On (Oxygen Delivery Method): room air    P/E:  NAD  AAOx2-3, LLE strength 4/5 due to pain, no other focal deficit noted   CTABL  s1S2 WNL  Abd soft, non tender, BS present   BLLE no edema or calf tenderness, chronic venous stasis skin change. Left hip tenderness     labs noted     A/P:  Syncope vs TIA vs Vertiginous migraine   Left hip degenerative joint disease   HTN  DM with hypoglycemia   DENIS on CKD   CAD s/p stent   H/o ICA/ SCA stenosis     Plan:  Cont tele   BP improved, cont Hydralazine and Imdur for now   Tylenol for pain in hip   Endocrine consult   ISS for BS management for now   Monitor renal function, gentle hydration, CK not so high, trending down  Cont aspirin and statin   Cardiology consult   TTE   Neurology consult for possible further imaging  Carotid doppler to evaluate ICA stenosis (may not need it if neck vessels are imaged by neurology)  PT eval   Fall precaution

## 2023-08-29 NOTE — PROGRESS NOTE ADULT - PROBLEM SELECTOR PLAN 10
- DVT PPX: Heparin - hx of depression on trazodone  - c/w home med - hx of lung disease (likely pulm fibrosis) on ofev  - not in formulary, pt not in exacerbation  - will continue to monitor

## 2023-08-30 DIAGNOSIS — M54.50 LOW BACK PAIN, UNSPECIFIED: ICD-10-CM

## 2023-08-30 DIAGNOSIS — L29.9 PRURITUS, UNSPECIFIED: ICD-10-CM

## 2023-08-30 DIAGNOSIS — M25.551 PAIN IN RIGHT HIP: ICD-10-CM

## 2023-08-30 LAB
A1C WITH ESTIMATED AVERAGE GLUCOSE RESULT: 6.8 % — HIGH (ref 4–5.6)
ALBUMIN SERPL ELPH-MCNC: 3.5 G/DL — SIGNIFICANT CHANGE UP (ref 3.5–5)
ALP SERPL-CCNC: 52 U/L — SIGNIFICANT CHANGE UP (ref 40–120)
ALT FLD-CCNC: 23 U/L DA — SIGNIFICANT CHANGE UP (ref 10–60)
ANION GAP SERPL CALC-SCNC: 7 MMOL/L — SIGNIFICANT CHANGE UP (ref 5–17)
AST SERPL-CCNC: 53 U/L — HIGH (ref 10–40)
BILIRUB SERPL-MCNC: 0.6 MG/DL — SIGNIFICANT CHANGE UP (ref 0.2–1.2)
BUN SERPL-MCNC: 33 MG/DL — HIGH (ref 7–18)
CALCIUM SERPL-MCNC: 9.1 MG/DL — SIGNIFICANT CHANGE UP (ref 8.4–10.5)
CHLORIDE SERPL-SCNC: 105 MMOL/L — SIGNIFICANT CHANGE UP (ref 96–108)
CK SERPL-CCNC: 632 U/L — HIGH (ref 21–215)
CO2 SERPL-SCNC: 23 MMOL/L — SIGNIFICANT CHANGE UP (ref 22–31)
CREAT SERPL-MCNC: 1.99 MG/DL — HIGH (ref 0.5–1.3)
CRP SERPL-MCNC: 18 MG/L — HIGH
CULTURE RESULTS: SIGNIFICANT CHANGE UP
EGFR: 26 ML/MIN/1.73M2 — LOW
ERYTHROCYTE [SEDIMENTATION RATE] IN BLOOD: 67 MM/HR — HIGH (ref 0–20)
ESTIMATED AVERAGE GLUCOSE: 148 MG/DL — HIGH (ref 68–114)
GLUCOSE BLDC GLUCOMTR-MCNC: 148 MG/DL — HIGH (ref 70–99)
GLUCOSE BLDC GLUCOMTR-MCNC: 197 MG/DL — HIGH (ref 70–99)
GLUCOSE BLDC GLUCOMTR-MCNC: 206 MG/DL — HIGH (ref 70–99)
GLUCOSE SERPL-MCNC: 141 MG/DL — HIGH (ref 70–99)
HCT VFR BLD CALC: 34.4 % — LOW (ref 34.5–45)
HGB BLD-MCNC: 11.2 G/DL — LOW (ref 11.5–15.5)
MCHC RBC-ENTMCNC: 31.7 PG — SIGNIFICANT CHANGE UP (ref 27–34)
MCHC RBC-ENTMCNC: 32.6 GM/DL — SIGNIFICANT CHANGE UP (ref 32–36)
MCV RBC AUTO: 97.5 FL — SIGNIFICANT CHANGE UP (ref 80–100)
NRBC # BLD: 0 /100 WBCS — SIGNIFICANT CHANGE UP (ref 0–0)
ORGANISM # SPEC MICROSCOPIC CNT: SIGNIFICANT CHANGE UP
ORGANISM # SPEC MICROSCOPIC CNT: SIGNIFICANT CHANGE UP
PLATELET # BLD AUTO: SIGNIFICANT CHANGE UP K/UL (ref 150–400)
POTASSIUM SERPL-MCNC: 5.3 MMOL/L — SIGNIFICANT CHANGE UP (ref 3.5–5.3)
POTASSIUM SERPL-SCNC: 5.3 MMOL/L — SIGNIFICANT CHANGE UP (ref 3.5–5.3)
PROT SERPL-MCNC: 8.1 G/DL — SIGNIFICANT CHANGE UP (ref 6–8.3)
RBC # BLD: 3.53 M/UL — LOW (ref 3.8–5.2)
RBC # FLD: 15.1 % — HIGH (ref 10.3–14.5)
SODIUM SERPL-SCNC: 135 MMOL/L — SIGNIFICANT CHANGE UP (ref 135–145)
SPECIMEN SOURCE: SIGNIFICANT CHANGE UP
T4 FREE SERPL-MCNC: 1 NG/DL — SIGNIFICANT CHANGE UP (ref 0.9–1.8)
TSH SERPL-MCNC: 2.42 UU/ML — SIGNIFICANT CHANGE UP (ref 0.34–4.82)
WBC # BLD: 8.06 K/UL — SIGNIFICANT CHANGE UP (ref 3.8–10.5)
WBC # FLD AUTO: 8.06 K/UL — SIGNIFICANT CHANGE UP (ref 3.8–10.5)

## 2023-08-30 PROCEDURE — 95816 EEG AWAKE AND DROWSY: CPT | Mod: 26

## 2023-08-30 PROCEDURE — 99232 SBSQ HOSP IP/OBS MODERATE 35: CPT | Mod: GC

## 2023-08-30 PROCEDURE — 99233 SBSQ HOSP IP/OBS HIGH 50: CPT

## 2023-08-30 PROCEDURE — 70551 MRI BRAIN STEM W/O DYE: CPT | Mod: 26

## 2023-08-30 PROCEDURE — 99222 1ST HOSP IP/OBS MODERATE 55: CPT

## 2023-08-30 PROCEDURE — 99231 SBSQ HOSP IP/OBS SF/LOW 25: CPT

## 2023-08-30 RX ORDER — SODIUM CHLORIDE 9 MG/ML
1000 INJECTION, SOLUTION INTRAVENOUS
Refills: 0 | Status: DISCONTINUED | OUTPATIENT
Start: 2023-08-30 | End: 2023-09-01

## 2023-08-30 RX ORDER — DIPHENHYDRAMINE HCL 50 MG
25 CAPSULE ORAL EVERY 6 HOURS
Refills: 0 | Status: DISCONTINUED | OUTPATIENT
Start: 2023-08-30 | End: 2023-09-01

## 2023-08-30 RX ORDER — TRAZODONE HCL 50 MG
25 TABLET ORAL DAILY
Refills: 0 | Status: DISCONTINUED | OUTPATIENT
Start: 2023-08-30 | End: 2023-08-30

## 2023-08-30 RX ORDER — LIDOCAINE 4 G/100G
1 CREAM TOPICAL EVERY 24 HOURS
Refills: 0 | Status: DISCONTINUED | OUTPATIENT
Start: 2023-08-30 | End: 2023-09-01

## 2023-08-30 RX ORDER — TRAZODONE HCL 50 MG
25 TABLET ORAL AT BEDTIME
Refills: 0 | Status: DISCONTINUED | OUTPATIENT
Start: 2023-08-30 | End: 2023-09-01

## 2023-08-30 RX ADMIN — HEPARIN SODIUM 5000 UNIT(S): 5000 INJECTION INTRAVENOUS; SUBCUTANEOUS at 06:07

## 2023-08-30 RX ADMIN — ISOSORBIDE MONONITRATE 30 MILLIGRAM(S): 60 TABLET, EXTENDED RELEASE ORAL at 12:49

## 2023-08-30 RX ADMIN — Medication 1 APPLICATION(S): at 17:24

## 2023-08-30 RX ADMIN — HEPARIN SODIUM 5000 UNIT(S): 5000 INJECTION INTRAVENOUS; SUBCUTANEOUS at 17:24

## 2023-08-30 RX ADMIN — Medication 10 MILLIGRAM(S): at 22:11

## 2023-08-30 RX ADMIN — SODIUM CHLORIDE 100 MILLILITER(S): 9 INJECTION, SOLUTION INTRAVENOUS at 14:47

## 2023-08-30 RX ADMIN — LIDOCAINE 1 PATCH: 4 CREAM TOPICAL at 12:48

## 2023-08-30 RX ADMIN — Medication 2: at 17:05

## 2023-08-30 RX ADMIN — Medication 81 MILLIGRAM(S): at 12:49

## 2023-08-30 RX ADMIN — LIDOCAINE 1 PATCH: 4 CREAM TOPICAL at 19:22

## 2023-08-30 RX ADMIN — Medication 200 MILLIGRAM(S): at 12:49

## 2023-08-30 RX ADMIN — Medication 10 MILLIGRAM(S): at 06:07

## 2023-08-30 RX ADMIN — ATORVASTATIN CALCIUM 40 MILLIGRAM(S): 80 TABLET, FILM COATED ORAL at 22:11

## 2023-08-30 RX ADMIN — Medication 1 APPLICATION(S): at 06:09

## 2023-08-30 RX ADMIN — Medication 10 MILLIGRAM(S): at 14:47

## 2023-08-30 RX ADMIN — Medication 25 MILLIGRAM(S): at 11:01

## 2023-08-30 RX ADMIN — Medication 25 MILLIGRAM(S): at 22:11

## 2023-08-30 RX ADMIN — Medication 50 MILLIGRAM(S): at 12:49

## 2023-08-30 NOTE — PROGRESS NOTE ADULT - PROBLEM SELECTOR PLAN 10
- hx of lung disease (likely pulm fibrosis) on ofev  - not in formulary, pt not in exacerbation  - will continue to monitor Pt reporting lower back pain following her fall   - Pt was on floor for approx 8hrs before being found  - Pt started on lidocaine patch and Tylenol PRN for pain control   - Monitor pain to ensure well controlled

## 2023-08-30 NOTE — PROGRESS NOTE ADULT - TIME BILLING
minutes spent the time noted on the day of this patient encounter preparing for, reviewing records/charts/labs, , coordination of care and primary team, providing and documenting the above E/M service and 50% of time spent face to face counseling and education provided to patient on disease course, and treatment/management. All questions and concerns were answered and addressed in detail.     I have discussed the patient's plan of care with primary team and resident.  I agree with the resident progress note/outlined plan of care. I have edited the note as necessary.

## 2023-08-30 NOTE — PROGRESS NOTE ADULT - PROBLEM SELECTOR PLAN 12
- DVT PPX: Heparin Pt w pmh of itchiness for which she takes diphenydramine at home  - c/w home med PRN

## 2023-08-30 NOTE — PROGRESS NOTE ADULT - ATTENDING COMMENTS
Patient was seen and examined at bedside. Denies any complains today. Reports pain in left hip improving     ICU Vital Signs Last 24 Hrs  T(C): 37.4 (30 Aug 2023 13:48), Max: 37.7 (30 Aug 2023 05:13)  T(F): 99.3 (30 Aug 2023 13:48), Max: 99.9 (30 Aug 2023 05:13)  HR: 91 (30 Aug 2023 13:48) (91 - 119)  BP: 126/87 (30 Aug 2023 13:48) (115/70 - 134/74)  BP(mean): --  ABP: --  ABP(mean): --  RR: 18 (30 Aug 2023 13:48) (18 - 18)  SpO2: 96% (30 Aug 2023 13:48) (94% - 97%)    O2 Parameters below as of 30 Aug 2023 13:48  Patient On (Oxygen Delivery Method): room air    P/E:  NAD  AAOx2-3, no focal deficit   CTABL  s1S2 WNL  Abd soft, non tender, BS present   BLLE no edema or calf tenderness, chronic venous stasis skin change. Left hip tenderness improved    labs noted     A/P:  Syncope vs TIA vs Vertiginous migraine   Left hip degenerative joint disease   HTN  DM with hypoglycemia   DENIS on CKD   CAD s/p stent   H/o ICA/ SCA stenosis     Plan:  MRI noted, neg for CVA, chronic microvascular ischemic change  EEG noted, no epileptiform change  Cont tele, patient has been tachycardic but no pause or bradycardia  BP improved, cont Hydralazine and Imdur for now   Cont aspirin and statin   Cardiology consult appreciated  TTE noted  Tylenol for pain in hip   Endocrine consult appreciated  Agree with ISS, avoid sulfonylurea on discharge  Syncope possibly due to hypoglycemia   Monitor renal function, gentle hydration,   Carotid doppler to evaluate ICA stenosis, patient has established outpatient follow up  PT eval   Fall precaution Patient was seen and examined at bedside. Denies any complains today. Reports pain in left hip improving     ICU Vital Signs Last 24 Hrs  T(C): 37.4 (30 Aug 2023 13:48), Max: 37.7 (30 Aug 2023 05:13)  T(F): 99.3 (30 Aug 2023 13:48), Max: 99.9 (30 Aug 2023 05:13)  HR: 91 (30 Aug 2023 13:48) (91 - 119)  BP: 126/87 (30 Aug 2023 13:48) (115/70 - 134/74)  BP(mean): --  ABP: --  ABP(mean): --  RR: 18 (30 Aug 2023 13:48) (18 - 18)  SpO2: 96% (30 Aug 2023 13:48) (94% - 97%)    O2 Parameters below as of 30 Aug 2023 13:48  Patient On (Oxygen Delivery Method): room air    P/E:  NAD  AAOx2-3, no focal deficit   CTABL  s1S2 WNL  Abd soft, non tender, BS present   BLLE no edema or calf tenderness, chronic venous stasis skin change. Left hip tenderness improved    labs noted     A/P:  Syncope vs TIA vs Vertiginous migraine   Left hip degenerative joint disease   HTN  DM with hypoglycemia   DENIS on CKD   CAD s/p stent   H/o ICA/ SCA stenosis     Plan:  MRI noted, neg for CVA, chronic microvascular ischemic change  EEG noted, no epileptiform change  Cont tele, patient has been tachycardic but no pause or bradycardia  BP improved, cont Hydralazine and Imdur for now   Cont aspirin and statin   Cardiology consult appreciated  TTE noted  Positive blood culture possibly contamination. no source found. repeat blood cx  Tylenol for pain in hip   Endocrine consult appreciated  Agree with ISS, avoid sulfonylurea on discharge  Syncope possibly due to hypoglycemia   Monitor renal function, gentle hydration,   Carotid doppler to evaluate ICA stenosis, patient has established outpatient follow up  PT eval   Fall precaution

## 2023-08-30 NOTE — PROGRESS NOTE ADULT - ASSESSMENT
77M PMhx HTN, CAD, Depression, Type 2 DM, CKD, pulmonary fibrosis presents s/p syncopal episode at home 2 days ago, she went to bed Saturday night, and woke up on the floor the next morning (she didn't know how she got there).    CKD 4  Prior baseline 1.3 back in 2020  current baseline around 1.7  higher today, variations expected  plan:  monitor  DASH diet  daily BMP    HTN  resume home regimen  monitor    Syncope  per primary team      For any question, call:  Cell # [phone]  Pager # [phone]  Callback # [phone]

## 2023-08-30 NOTE — PROGRESS NOTE ADULT - SUBJECTIVE AND OBJECTIVE BOX
CHIEF COMPLAINT:  Patient is a 77y old  Female who presents with a chief complaint of syncope (29 Aug 2023 13:42)      ROS/SUBJECTIVE:    HPI:  77M PMhx HTN, CAD, Depression, Type 2 DM, CKD, pulmonary fibrosis presents s/p syncopal episode at home 2 days ago, she went to bed Saturday night, and woke up on the floor the next morning (she didn't know how she got there). It took her some time to regain the strength to get up. Today, he weakness persisted, so she called her sister. States that she has subjective fevers, cough, generalized body aches, and headache for the past 5 days. She got her shingles and pneumonia vaccines 5 days ago, and had b/l arm soreness after. Pt. admits that her PO intake decreased. No recent travel or sick contacts. Denies chest pain, sob, chills, palpitations, headache, n/v/d, abdominal pain.  (28 Aug 2023 21:36). Cardiology consulted for syncope     Interm HPI: Patient seen and examined at bedside, denies any acute complaints     PREVIOUS DIAGNOSTIC TESTING:      ECHO: FINDINGS: 2016: EF = 55 to 60 %, G1DD    STRESS: FINDINGS: 2016:  Normal study; no evidence for myocardial infarction or ischemia    CATHETERIZATION: FINDINGS:   2016 with Dr. Rashid for dyspnea on exertion. Proximal LAD. CX, RCA (Prior stent) There was a tubular 10 % stenosis at a site. RPDA: There was a discrete 30 % stenosis at a site      RISK FACTORS  Pmhx:   Smoking hx: quite 8 years ago   Family hx : No family hx cardiac diseases       MEDICATIONS  (STANDING):  allopurinol 200 milliGRAM(s) Oral daily  ammonium lactate 12% Lotion 1 Application(s) Topical two times a day  aspirin  chewable 81 milliGRAM(s) Oral daily  atorvastatin 40 milliGRAM(s) Oral at bedtime  heparin   Injectable 5000 Unit(s) SubCutaneous every 12 hours  hydrALAZINE 10 milliGRAM(s) Oral three times a day  insulin lispro (ADMELOG) corrective regimen sliding scale   SubCutaneous three times a day before meals  insulin lispro (ADMELOG) corrective regimen sliding scale   SubCutaneous at bedtime  isosorbide   mononitrate ER Tablet (IMDUR) 30 milliGRAM(s) Oral daily  lactated ringers. 1000 milliLiter(s) (100 mL/Hr) IV Continuous <Continuous>  lidocaine   4% Patch 1 Patch Transdermal every 24 hours  traZODone 50 milliGRAM(s) Oral daily    MEDICATIONS  (PRN):  acetaminophen     Tablet .. 650 milliGRAM(s) Oral every 6 hours PRN Temp greater or equal to 38C (100.4F), Mild Pain (1 - 3)  diphenhydrAMINE 25 milliGRAM(s) Oral every 6 hours PRN Rash and/or Itching        PHYSICAL EXAM:  Vital Signs Last 24 Hrs  T(C): 37.7 (30 Aug 2023 05:13), Max: 37.7 (30 Aug 2023 05:13)  T(F): 99.9 (30 Aug 2023 05:13), Max: 99.9 (30 Aug 2023 05:13)  HR: 102 (30 Aug 2023 05:13) (99 - 119)  BP: 128/68 (30 Aug 2023 05:13) (115/70 - 154/82)  BP(mean): --  RR: 18 (30 Aug 2023 05:13) (18 - 18)  SpO2: 95% (30 Aug 2023 05:13) (94% - 98%)    Parameters below as of 30 Aug 2023 02:03  Patient On (Oxygen Delivery Method): room air      Constitutional: NAD, awake and alert  HEENT: PERR, EOMI, Normal Hearing, MMM  Neck: Soft and supple, No LAD, No JVD  Respiratory: Breath sounds are clear bilaterally, No wheezing, rales or rhonchi  Cardiovascular: S1 and S2, tachycardic no Murmurs, gallops or rubs  Gastrointestinal: Bowel Sounds present, soft, nontender, nondistended, no guarding, no rebound  Extremities: No peripheral edema  Vascular: 2+ peripheral pulses  Neurological: A/O x 3, no focal deficits  Musculoskeletal: 5/5 strength b/l upper and lower extremities  Skin: No rashes    =======================================    INTERPRETATION OF TELEMETRY:    ECG: EC2020,  NSR     ========================================    LABS:                        11.2   8.06  )-----------( Clumped    ( 30 Aug 2023 07:10 )             34.4     08    135  |  105  |  33<H>  ----------------------------<  141<H>  5.3   |  23  |  1.99<H>    Ca    9.1      30 Aug 2023 07:10  Phos  3.2       Mg     2.5         TPro  8.1  /  Alb  3.5  /  TBili  0.6  /  DBili  x   /  AST  53<H>  /  ALT  23  /  AlkPhos  52  30    CARDIAC MARKERS ( 29 Aug 2023 06:10 )  x     / x     / 635 U/L / x     / x      CARDIAC MARKERS ( 28 Aug 2023 15:00 )  x     / x     / 747 U/L / x     / x          PT/INR - ( 28 Aug 2023 16:00 )   PT: 10.7 sec;   INR: 0.94 ratio         PTT - ( 28 Aug 2023 16:00 )  PTT:35.3 sec  Urinalysis Basic - ( 30 Aug 2023 07:10 )    Color: x / Appearance: x / SG: x / pH: x  Gluc: 141 mg/dL / Ketone: x  / Bili: x / Urobili: x   Blood: x / Protein: x / Nitrite: x   Leuk Esterase: x / RBC: x / WBC x   Sq Epi: x / Non Sq Epi: x / Bacteria: x      I&O's Summary    BNP      RADIOLOGY & ADDITIONAL STUDIES:      Assessment and plan     #Syncope   p/w syncopal episode.   Rule out cardiac etiology, neurological or metabolic  low BG on admission 45   Trop neg  c/w Telemetry   EKG: NSR   Echo: prior 2016: EF = 55 to 60 %, G1DD  23: EF: 55-60%, G1DD   Orthostatics     #Sinus Tachycardia  sinus tachycardia on telemetry  r/o reversible causes like infection, pain, hyperthyroidism    Ua negative, no leukocytosis   c/w telemetry   TSH NL  Gram positive cocci on bcux, ABx and recommendation as per ID      #CAD   s/p cardiac cath on 2016 for dyspnea on exertion. Proximal LAD. CX, RCA (Prior stent) 10 % stenosis at a site. RPDA 30 % stenosis   c/w Aspirin and simvastatin 40mg     #HTN  on chlorthalidone 25mg, Isosorbide mononitrate 30mg, valsartan 160mg, hydralazine 10mg   c/w Imdur and hydralazine  Hold chlorthalidone and valsartan in setting of DENIS   Monitor BP       Case discussed with Cardiology Attending Dr. Guardado   PGY-2 Codie Tijerina MD

## 2023-08-30 NOTE — CONSULT NOTE ADULT - TIME BILLING
- Review of records, vital signs and daily labs, microbiology and other Infectious Diseases related work up  - General physical examination performed  - Generation of Infectious Diseases treatment plan discussed with attending Physician  - Coordination of care with the multidisciplinary team  -Counseling of the patient and/or family members
minutes spent the time noted on the day of this patient encounter preparing for, reviewing records/charts/labs, interview and physical examination, coordination of care with patient and primary team, providing and documenting the above E/M service and 50% of time spent face to face counseling and education provided to patient on disease course, and treatment/management. All questions and concerns were answered and addressed in detail.

## 2023-08-30 NOTE — PROGRESS NOTE ADULT - PROBLEM SELECTOR PLAN 1
- Bcx from 8/28 growing gram + cocci in cluster  - F/U final Bcx result  - No wbc, afebrile, no identifiable source for now   - F/U ESR & Crp  - ID Dr. Rodriguez consulted - Bcx from 8/28 growing gram + cocci in cluster  - final result: coagulase negative staph  - Bcx reordered due to possible contamination/lack of constitutional sxs  - No wbc, afebrile, no identifiable source for now   - ESR elevated at 67  - ID Dr. Rodriguez consulted Pt came to ED after syncopal episode in which she lost consciousness & woke with slurred speech  - Pt was on the floor for approximately 8 hrs before being found  - CK elevated on adm 747 trending down 635  - CTH/C-spine/C/Ab/P wnl. CXR and Pelvis Xray wnl.   - Echo showed grade I diastolic dysfunction w/ normal EF (55-60%)  - Orthostatic b/p  - F/U MRI brain & EEG  - Cardio Dr. Guardado consulted  - Neuro Dr. Izaguirre consulted Pt came to ED after syncopal episode in which she lost consciousness & woke with slurred speech  - Pt was on the floor for approximately 8 hrs before being found  - CK elevated on adm 747 trending down 635 > 632  - CTH/C-spine/C/Ab/P wnl. CXR and Pelvis Xray wnl.   - Echo showed grade I diastolic dysfunction w/ normal EF (55-60%)  - Orthostatic b/p  - EEG showed bilateral frontotemporal focal cerebral dysfunction may be nonspecific or can be structural or functional in etiology  - MRI brain showed no acute intracranial hemorrhage or evidence of acute ischemia   - Cardio Dr. Guardado consulted  - Neuro Dr. Izaguirre consulted

## 2023-08-30 NOTE — PHARMACOTHERAPY INTERVENTION NOTE - COMMENTS
Repeat blood cultures ordered    Culture Date/Time: 2023-08-28 16:15  BCID: -  Coagulase negative Staphylococcus

## 2023-08-30 NOTE — PROGRESS NOTE ADULT - PROBLEM SELECTOR PLAN 2
- presents s/p questionable syncopal episode at home 2 days ago, generalized body aches, and headache for the past 5 days. Pt. admits that her PO intake decreased.   - Vitals: BP 170s-158/80s > 130s systolic after resuming her BP meds.  - Labs significant for a POCT 45 > , , Cr 1.78 (baseline 1.3-1.5).   - Trop neg, UA neg, RVP neg.   - Admitted to telemetry for syncope w/u.   - tele monitoring  - PT recs Home PT - presents s/p questionable syncopal episode at home 2 days ago, generalized body aches, and headache for the past 5 days. Pt. admits that her PO intake decreased.   - Vitals: BP 170s-158/80s > 130s systolic after resuming her BP meds.  - Labs significant for a POCT 45 > ,  > 635, Cr 1.78 > 1.99 (baseline 1.3-1.5).   - Trop neg, UA neg, RVP neg.   - Admitted to telemetry for syncope w/u.   - tele monitoring  - PT recs Home PT Pt p/w generalized weakness in addition to her syncope  - Labs significant for a POCT blood glucose of 45 on admission, which has since improved to wnl  - Trop neg, UA neg, RVP neg  - tele monitoring  - PT recs Home PT

## 2023-08-30 NOTE — EEG REPORT - NS EEG TEXT BOX
OLIVAREZ DANETTE [id number]     Study Date: 08-30-23  Duration:   --------------------------------------------------------------------------------------------------  History:  CC/ HPI Patient is a 77y old  Female who presents with a chief complaint of syncope (30 Aug 2023 11:27)    MEDICATIONS  (STANDING):  allopurinol 200 milliGRAM(s) Oral daily  ammonium lactate 12% Lotion 1 Application(s) Topical two times a day  aspirin  chewable 81 milliGRAM(s) Oral daily  atorvastatin 40 milliGRAM(s) Oral at bedtime  heparin   Injectable 5000 Unit(s) SubCutaneous every 12 hours  hydrALAZINE 10 milliGRAM(s) Oral three times a day  insulin lispro (ADMELOG) corrective regimen sliding scale   SubCutaneous three times a day before meals  insulin lispro (ADMELOG) corrective regimen sliding scale   SubCutaneous at bedtime  isosorbide   mononitrate ER Tablet (IMDUR) 30 milliGRAM(s) Oral daily  lactated ringers. 1000 milliLiter(s) (100 mL/Hr) IV Continuous <Continuous>  lidocaine   4% Patch 1 Patch Transdermal every 24 hours  traZODone 50 milliGRAM(s) Oral daily    --------------------------------------------------------------------------------------------------  Study Interpretation:    [[[Abbreviation Key:  PDR=alpha rhythm/posterior dominant rhythm. A-P=anterior posterior.  Amplitude: ‘very low’:<20; ‘low’:20-49; ‘medium’:; ‘high’:>150uV.  Persistence for periodic/rhythmic patterns (% of epoch) ‘rare’:<1%; ‘occasional’:1-10%; ‘frequent’:10-50%; ‘abundant’:50-90%; ‘continuous’:>90%.  Persistence for sporadic discharges: ‘rare’:<1/hr; ‘occasional’:1/min-1/hr; ‘frequent’:>1/min; ‘abundant’:>1/10 sec.  RPP=rhythmic and periodic patterns; GRDA=generalized rhythmic delta activity; FIRDA=frontal intermittent GRDA; LRDA=lateralized rhythmic delta activity; TIRDA=temporal intermittent rhythmic delta activity;  LPD=PLED=lateralized periodic discharges; GPD=generalized periodic discharges; BIPDs =bilateral independent periodic discharges; Mf=multifocal; SIRPDs=stimulus induced rhythmic, periodic, or ictal appearing discharges; BIRDs=brief potentially ictal rhythmic discharges >4 Hz, lasting .5-10s; PFA (paroxysmal bursts >13 Hz or =8 Hz <10s).  Modifiers: +F=with fast component; +S=with spike component; +R=with rhythmic component.  S-B=burst suppression pattern.  Max=maximal. N1-drowsy; N2-stage II sleep; N3-slow wave sleep. SSS/BETS=small sharp spikes/benign epileptiform transients of sleep. HV=hyperventilation; PS=photic stimulation]]]    Daily EEG Visual Analysis    FINDINGS:      Background:  Continuity: Continuous  Symmetry: Symmetric  Posterior dominant rhythm (PDR): 8.5 Hz, reactive to eye closure. Symmetric low-amplitude frontal beta in wakefulness.  State Change: Present  Voltage: Normal  Anterior Posterior Gradient: Present  Other background findings: None  Breach: Absent    Background Slowing:  Generalized slowing: None  Focal slowing: Frequent bilateral independent frontotemporal focal polymorphic delta and theta slowing    State Changes:   Drowsiness is characterized by fragmentation, attenuation, and slowing of the background activity.  Stage 2 sleep is not captured.    Interictal Findings:  None    Electrographic and Electroclinical seizures:  None    Other Clinical Events:  None    Activation Procedures:   Hyperventilation is not performed.    Photic stimulation is performed and does not elicit any abnormalities or change in the background.      Artifacts:  Intermittent myogenic and movement artifacts are present.    EKG:  Single-lead EKG shows sinus rhythm with frequent PACs. Partly limited by electrode motion artifact.    EEG Classification / Summary:  Abnormal EEG in the awake, drowsy states.   -Frequent bilateral independent frontotemporal focal slowing.  -No epileptiform abnormalities are captured.     Clinical Impression:  -Bilateral frontotemporal focal cerebral dysfunction can be structural or functional in etiology.             -------------------------------------------------------------------------------------------------------  [facility] EEG Reading Room Ph#: [phone]  Epilepsy Answering Service after 5PM and before 8:30AM: Ph#: [phone]    Mary Kay Vela MD  Attending Physician, [facility]   OLIVAREZ DANETTE [id number]     Study Date: 08-30-23  Duration:   --------------------------------------------------------------------------------------------------  History:  CC/ HPI Patient is a 77y old  Female who presents with a chief complaint of syncope (30 Aug 2023 11:27)    MEDICATIONS  (STANDING):  allopurinol 200 milliGRAM(s) Oral daily  ammonium lactate 12% Lotion 1 Application(s) Topical two times a day  aspirin  chewable 81 milliGRAM(s) Oral daily  atorvastatin 40 milliGRAM(s) Oral at bedtime  heparin   Injectable 5000 Unit(s) SubCutaneous every 12 hours  hydrALAZINE 10 milliGRAM(s) Oral three times a day  insulin lispro (ADMELOG) corrective regimen sliding scale   SubCutaneous three times a day before meals  insulin lispro (ADMELOG) corrective regimen sliding scale   SubCutaneous at bedtime  isosorbide   mononitrate ER Tablet (IMDUR) 30 milliGRAM(s) Oral daily  lactated ringers. 1000 milliLiter(s) (100 mL/Hr) IV Continuous <Continuous>  lidocaine   4% Patch 1 Patch Transdermal every 24 hours  traZODone 50 milliGRAM(s) Oral daily    --------------------------------------------------------------------------------------------------  Study Interpretation:    [[[Abbreviation Key:  PDR=alpha rhythm/posterior dominant rhythm. A-P=anterior posterior.  Amplitude: ‘very low’:<20; ‘low’:20-49; ‘medium’:; ‘high’:>150uV.  Persistence for periodic/rhythmic patterns (% of epoch) ‘rare’:<1%; ‘occasional’:1-10%; ‘frequent’:10-50%; ‘abundant’:50-90%; ‘continuous’:>90%.  Persistence for sporadic discharges: ‘rare’:<1/hr; ‘occasional’:1/min-1/hr; ‘frequent’:>1/min; ‘abundant’:>1/10 sec.  RPP=rhythmic and periodic patterns; GRDA=generalized rhythmic delta activity; FIRDA=frontal intermittent GRDA; LRDA=lateralized rhythmic delta activity; TIRDA=temporal intermittent rhythmic delta activity;  LPD=PLED=lateralized periodic discharges; GPD=generalized periodic discharges; BIPDs =bilateral independent periodic discharges; Mf=multifocal; SIRPDs=stimulus induced rhythmic, periodic, or ictal appearing discharges; BIRDs=brief potentially ictal rhythmic discharges >4 Hz, lasting .5-10s; PFA (paroxysmal bursts >13 Hz or =8 Hz <10s).  Modifiers: +F=with fast component; +S=with spike component; +R=with rhythmic component.  S-B=burst suppression pattern.  Max=maximal. N1-drowsy; N2-stage II sleep; N3-slow wave sleep. SSS/BETS=small sharp spikes/benign epileptiform transients of sleep. HV=hyperventilation; PS=photic stimulation]]]    Daily EEG Visual Analysis    FINDINGS:      Background:  Continuity: Continuous  Symmetry: Symmetric  Posterior dominant rhythm (PDR): 8.5 Hz, reactive to eye closure. Symmetric low-amplitude frontal beta in wakefulness.  State Change: Present  Voltage: Normal  Anterior Posterior Gradient: Present  Other background findings: None  Breach: Absent    Background Slowing:  Generalized slowing: None  Focal slowing: Frequent bilateral independent frontotemporal focal polymorphic delta and theta slowing    State Changes:   Drowsiness is characterized by fragmentation, attenuation, and slowing of the background activity.  Stage 2 sleep is not captured.    Interictal Findings:  None    Electrographic and Electroclinical seizures:  None    Other Clinical Events:  None    Activation Procedures:   Hyperventilation is not performed.    Photic stimulation is performed and does not elicit any abnormalities or change in the background.      Artifacts:  Intermittent myogenic and movement artifacts are present.    EKG:  Single-lead EKG shows sinus rhythm with frequent PACs. Partly limited by electrode motion artifact.    EEG Classification / Summary:  Abnormal EEG in the awake, drowsy states.   -Frequent bilateral independent frontotemporal focal slowing.  -No epileptiform abnormalities are captured.     Clinical Impression:  -Bilateral frontotemporal focal cerebral dysfunction may be nonspecific or can be structural or functional in etiology.             -------------------------------------------------------------------------------------------------------  [facility] EEG Reading Room Ph#: [phone]  Epilepsy Answering Service after 5PM and before 8:30AM: Ph#: [phone]    Mary Kay Vela MD  Attending Physician, [facility]

## 2023-08-30 NOTE — CONSULT NOTE ADULT - ASSESSMENT
HPI:  77M PMhx HTN, CAD, Depression, Type 2 DM, CKD 4, pulmonary fibrosis presents s/p syncopal episode at home 8/26. As per pt she went to sleep that night with some complains of HA and apparently passed out until the next morning when she found herself on the floor, as per pt the episode was unwitnessed since she lives alone, denies any aura, no blurred vision, no shaking movements that she can recall, no dizziness, no lightheadedness, she thinks she saw somethings were not there when she woke up and she noticed her self weaker , her speech was slurred , she has a headache and she urinated on herself.  Off note the pt had pneumonia and shingles vaccination 5 days before the episode. The next day it took some time for her to regain her strength and get up the floor, she called her sister and decided to come to the hospital for evaluation. Afebrile, no N/V or diarrhea, the HA resolved, no cough or sob, no chest pain or palpitations. No recent travel or sick contacts. She estates her LE weakness is slowly getting better but she feels still weak.   In the ED noted hypoglycemic.  ID consulted  for + blood cx 1/4 blotte + CoNS.    Vitals: BP 170s-158/80s, 99.3 F, 96 RA  EKG: NSR   Labs: WC 10, POCT 45 > , , Cr 1.78 (baseline 1.3-1.5), trop neg, UA neg, RVP neg   CT H/C/Ab/P- No acute traumatic injury in the chest, abdomen or pelvis. CT Brain/C-spine- No acute intracranial hemorrhage, brain edema, or mass effect. No displaced calvarial fracture. No acute fracture or traumatic subluxation. No prevertebral soft tissue swelling. Degenerative changes.  CXR- no focal consolidations, effusions   Pelvis Xray grossly normal (28 Aug 2023 21:36)    Allergies:penicillin (Swelling)    PAST MEDICAL & SURGICAL HISTORY:  CAD (Coronary Artery Disease)  Coronary Stent x 2 (1998, 2011)  HTN (Hypertension)  Diabetes Mellitus Type II x 14 years  Diabetic Neuropathy  Diabetic Nephropathy  Hypercholesterolemia  OA (Osteoarthritis) of Knee bilateral  GERD (Gastroesophageal Reflux Disease)   Anemia GI w/u showing a stable peptic ulcer  Depression denies hospitalization/ therapy  PUD (Peptic Ulcer Disease) denies bleed/ txn  Heart Attack 7/2011  Renal insufficiency CKD 4  Sleep apnea, unspecified type  S/P hip replacement right   Bowel obstruction surgical intervention  Stented coronary artery  S/P cataract extraction    SOCIAL HISTORY: former smoker ;no alcohol abuse, no IVDU(quit EtOH and cigarettes 8 yrs ago)  FAMILY HISTORY: Family history of essential hypertension  Family history of diabetes mellitus  no pertinent related medical history    REVIEW OF SYSTEMS:  CONSTITUTIONAL:  No fevers or chills, good appetite, good general state  EYES/ENT:  No visual changes;  No vertigo or throat pain   NECK:  No neck pain or stiffness  RESPIRATORY:  No cough, wheezing, hemoptysis; No shortness of breath  CARDIOVASCULAR:  No chest pain or palpitations  GASTROINTESTINAL:  No abdominal pain. No nausea, vomiting, or hematemesis; No diarrhea or constipation. No melena or hematochezia.  GENITOURINARY:  No dysuria, frequency or hematuria  NEUROLOGICAL:  No HA, no numbness or LE weakness  MSK: no back pain, no joint pain  SKIN:  No itching, no skin rash    PHYSICAL EXAM:  VITALS: Vital Signs Last 24 Hrs  T(C): 37.2 (30 Aug 2023 10:13), Max: 37.7 (30 Aug 2023 05:13)  T(F): 98.9 (30 Aug 2023 10:13), Max: 99.9 (30 Aug 2023 05:13)  HR: 95 (30 Aug 2023 10:13) (95 - 119)  BP: 131/75 (30 Aug 2023 10:13) (115/70 - 154/82)  BP(mean): --  RR: 18 (30 Aug 2023 10:13) (18 - 18)  SpO2: 95% (30 Aug 2023 10:13) (94% - 98%)    Parameters below as of 30 Aug 2023 10:13  Patient On (Oxygen Delivery Method): room air      Gen: AOx3, NAD, non-toxic, pleasant  HEAD:  Atraumatic, Normocephalic  EYES: PERRLA, conjunctiva and sclera clear  ENT: Moist mucous membranes  NECK: Supple, No JVD  CV: S1+S2 normal, no murmurs   Resp: Clear bilat, no resp distress, no crackles/wheezes  Abd: Soft, nontender, +BS  Ext: No LE edema, no cyanosis, LE pulses present  : no dysuria, no gross hematuria, Butt   IV/Skin: No thrombophlebitis, no skin rash  Msk: No low back pain, no arthralgias, no joint swelling  Neuro: AAOx3. No sensory deficits, no motor deficits  Psych: no anxiety, no delusional ideas, no suicidal ideation    LABS/DIAGNOSTIC TESTS:                        11.2   8.06  )-----------( Clumped    ( 30 Aug 2023 07:10 )             34.4     WBC Count: 8.06 K/uL (08-30 @ 07:10)  WBC Count: 8.99 K/uL (08-29 @ 06:10)  WBC Count: 10.74 K/uL (08-28 @ 15:00)    08-30    135  |  105  |  33<H>  ----------------------------<  141<H>  5.3   |  23  |  1.99<H>    Ca    9.1      30 Aug 2023 07:10  Phos  3.2     08-29  Mg     2.5     08-29    TPro  8.1  /  Alb  3.5  /  TBili  0.6  /  DBili  x   /  AST  53<H>  /  ALT  23  /  AlkPhos  52  08-30    Urinalysis Basic -Urine Microscopic-Add On (NC) (08.28.23 @ 15:55)    Squamous Epithelial Cells: Present   Bacteria: Few /HPF   Red Blood Cell - Urine: 2 /HPF   White Blood Cell - Urine: 2 /HPF    CULTURES:   Culture - Blood (collected 08-28-23 @ 16:15)  Source: .Blood Blood-Peripheral  Gram Stain (08-29-23 @ 16:08):    Growth in anaerobic bottle: Gram Positive Cocci in Clusters  Preliminary Report (08-29-23 @ 16:08):    Growth in anaerobic bottle: Gram Positive Cocci in Clusters Direct identification is available within approximately 3-5 hours either by Blood Panel Multiplexed PCR or Direct    MALDI-TOF. Details: [email or web address]  Organism: Blood Culture PCR (08-29-23 @ 17:15)  Organism: Blood Culture PCR (08-29-23 @ 17:15)      Method Type: PCR      -  Coagulase negative Staphylococcus: Detec    Culture - Urine (collected 08-28-23 @ 15:55)  Source: Clean Catch Clean Catch (Midstream)  Final Report (08-29-23 @ 18:22):    <10,000 CFU/mL Normal Urogenital Sera    Culture - Blood (collected 08-28-23 @ 15:00)  Source: .Blood Blood-Peripheral  Preliminary Report (08-29-23 @ 21:01):    No growth at 24 hours    Rapid RVP Result: NotDetec (08-28 @ 15:00)    RADIOLOGY: < from: MR Head No Cont (08.30.23 @ 12:06) >    IMPRESSION: No acute intracranial hemorrhage or evidence of acute ischemia.   Similar-appearing moderate severity chronic white matter microvascular type changes.    < from: Transthoracic Echocardiogram (08.29.23 @ 07:46) >  CONCLUSIONS:  1. Normal mitral valve.  2. Calcified trileaflet aortic valvewith normal opening. Mild aortic regurgitation.  3. Aortic Root: 2.6 cm.  4. Normal left atrium.  LA volume index = 29 cc/m2.  5. Normal left ventricular internal dimensions and wall thicknesses.  6. Normal Left Ventricular Systolic Function,  (EF = 55 to 60%)  7. Grade I diastolic dysfunction (Impaired relaxation, mild).  8. Normal right atrium.  9. Normal right ventricular size and systolic function (TAPSE  1.8cm).  10. RA Pressure is 8 mm Hg.  11. RV systolic pressure is mildly increased at  44 mm Hg.  12. There is mild tricuspid regurgitation.  13. There is trace pulmonic regurgitation.  14. Normal pericardium with no pericardial effusion.    ANTIBIOTICS: N/A     IMPRESSION AND PLAN:  78 yo female with HTN, DM type 2, CAD s/p stent, CKD 4, HLD, PUD admitted currently after syncopal episode +/- Hypoglycemia undergoing garces.  ID called for + blood cx 1/4 bottles + CoNS on admission 8/28.  No fever, no leukocytosis, no evidence of infectious process.   Undergoing Garces by Neuro, today EEG, no new acute ischemia on Brain MRI.  ECHO reviewed, G1DD, EF 55-60 %    -Syncope vs TIA  -Hypoglycemia (glycemia 52 upon arrival, FS 45) likely 2/2 Insulin and DM meds & worsening CKD  -Blood culture contamination  -DM  -CKD-4      PLAN:  No indication for abx currently, no infectious diseases process identified.  Close glycemia control and adjustment of DM meds in the setting of progressive CKD.  Rest as per primary   Discussed with Dr. Baer    Please reach ID with any questions or concerns  Dr. Ashleigh Aggarwal  Available in Teams               HPI:  77M PMhx HTN, CAD, Depression, Type 2 DM, CKD 4, pulmonary fibrosis presents s/p syncopal episode at home 8/26. As per pt she went to sleep that night with some complains of HA and apparently passed out until the next morning when she found herself on the floor, as per pt the episode was unwitnessed since she lives alone, denies any aura, no blurred vision, no shaking movements that she can recall, no dizziness, no lightheadedness, she thinks she saw somethings were not there when she woke up and she noticed her self weaker , her speech was slurred , she has a headache and she urinated on herself.  Off note the pt had pneumonia and shingles vaccination 5 days before the episode. The next day it took some time for her to regain her strength and get up the floor, she called her sister and decided to come to the hospital for evaluation. Afebrile, no N/V or diarrhea, the HA resolved, no cough or sob, no chest pain or palpitations. No recent travel or sick contacts. She estates her LE weakness is slowly getting better but she feels still weak.   In the ED noted hypoglycemic.  ID consulted  for + blood cx 1/4 blotte + CoNS.    Vitals: BP 170s-158/80s, 99.3 F, 96 RA  EKG: NSR   Labs: WC 10, POCT 45 > , , Cr 1.78 (baseline 1.3-1.5), trop neg, UA neg, RVP neg   CT H/C/Ab/P- No acute traumatic injury in the chest, abdomen or pelvis. CT Brain/C-spine- No acute intracranial hemorrhage, brain edema, or mass effect. No displaced calvarial fracture. No acute fracture or traumatic subluxation. No prevertebral soft tissue swelling. Degenerative changes.  CXR- no focal consolidations, effusions   Pelvis Xray grossly normal (28 Aug 2023 21:36)    Allergies:penicillin (Swelling)    PAST MEDICAL & SURGICAL HISTORY:  CAD (Coronary Artery Disease)  Coronary Stent x 2 (1998, 2011)  HTN (Hypertension)  Diabetes Mellitus Type II x 14 years  Diabetic Neuropathy  Diabetic Nephropathy  Hypercholesterolemia  OA (Osteoarthritis) of Knee bilateral  GERD (Gastroesophageal Reflux Disease)   Anemia GI w/u showing a stable peptic ulcer  Depression denies hospitalization/ therapy  PUD (Peptic Ulcer Disease) denies bleed/ txn  Heart Attack 7/2011  Renal insufficiency CKD 4  Sleep apnea, unspecified type  S/P hip replacement right   Bowel obstruction surgical intervention  Stented coronary artery  S/P cataract extraction    SOCIAL HISTORY: former smoker ;no alcohol abuse, no IVDU(quit EtOH and cigarettes 8 yrs ago)  FAMILY HISTORY: Family history of essential hypertension  Family history of diabetes mellitus  no pertinent related medical history    REVIEW OF SYSTEMS:  CONSTITUTIONAL:  No fevers or chills, good appetite, good general state  EYES/ENT:  No visual changes;  No vertigo or throat pain   NECK:  No neck pain or stiffness  RESPIRATORY:  No cough, wheezing, hemoptysis; No shortness of breath  CARDIOVASCULAR:  No chest pain or palpitations  GASTROINTESTINAL:  No abdominal pain. No nausea, vomiting, or hematemesis; No diarrhea or constipation. No melena or hematochezia.  GENITOURINARY:  No dysuria, frequency or hematuria  NEUROLOGICAL:  No HA, no numbness or LE weakness  MSK: no back pain, no joint pain  SKIN:  No itching, no skin rash    PHYSICAL EXAM:  VITALS: Vital Signs Last 24 Hrs  T(C): 37.2 (30 Aug 2023 10:13), Max: 37.7 (30 Aug 2023 05:13)  T(F): 98.9 (30 Aug 2023 10:13), Max: 99.9 (30 Aug 2023 05:13)  HR: 95 (30 Aug 2023 10:13) (95 - 119)  BP: 131/75 (30 Aug 2023 10:13) (115/70 - 154/82)  BP(mean): --  RR: 18 (30 Aug 2023 10:13) (18 - 18)  SpO2: 95% (30 Aug 2023 10:13) (94% - 98%)    Parameters below as of 30 Aug 2023 10:13  Patient On (Oxygen Delivery Method): room air      Gen: AOx3, NAD, non-toxic, pleasant  HEAD:  Atraumatic, Normocephalic  EYES: PERRLA, conjunctiva and sclera clear  ENT: Moist mucous membranes  NECK: Supple, No JVD  CV: S1+S2 normal, no murmurs   Resp: Clear bilat, no resp distress, no crackles/wheezes  Abd: Soft, nontender, +BS  Ext: No LE edema, no cyanosis, LE pulses present  : no dysuria, no gross hematuria, Butt   IV/Skin: No thrombophlebitis, no skin rash  Msk: No low back pain, no arthralgias, no joint swelling  Neuro: AAOx3. No sensory deficits, no motor deficits  Psych: no anxiety, no delusional ideas, no suicidal ideation    LABS/DIAGNOSTIC TESTS:                        11.2   8.06  )-----------( Clumped    ( 30 Aug 2023 07:10 )             34.4     WBC Count: 8.06 K/uL (08-30 @ 07:10)  WBC Count: 8.99 K/uL (08-29 @ 06:10)  WBC Count: 10.74 K/uL (08-28 @ 15:00)    08-30    135  |  105  |  33<H>  ----------------------------<  141<H>  5.3   |  23  |  1.99<H>    Ca    9.1      30 Aug 2023 07:10  Phos  3.2     08-29  Mg     2.5     08-29    TPro  8.1  /  Alb  3.5  /  TBili  0.6  /  DBili  x   /  AST  53<H>  /  ALT  23  /  AlkPhos  52  08-30    Urinalysis Basic -Urine Microscopic-Add On (NC) (08.28.23 @ 15:55)    Squamous Epithelial Cells: Present   Bacteria: Few /HPF   Red Blood Cell - Urine: 2 /HPF   White Blood Cell - Urine: 2 /HPF    CULTURES:   Culture - Blood (08.28.23 @ 16:15)    Gram Stain:   Growth in anaerobic bottle: Gram Positive Cocci in Clusters   -  Coagulase negative Staphylococcus: Detec   Specimen Source: .Blood Blood-Peripheral   Organism: Blood Culture PCR   Culture Results:   Growth in anaerobic bottle: Staphylococcus hominis  Coagulase Negative Staphylococci isolated from a single blood culture set  may represent contamination.  Contact the Microbiology Department at [phone] if susceptibility  testing is clinically indicated.  Direct identification is available within approximately 3-5  hours either by Blood Panel Multiplexed PCR or Direct  MALDI-TOF. Details: [email or web address]   Organism Identification: Blood Culture PCR   Method Type: PCR    Culture - Urine (collected 08-28-23 @ 15:55)  Source: Clean Catch Clean Catch (Midstream)  Final Report (08-29-23 @ 18:22):    <10,000 CFU/mL Normal Urogenital Sera    Culture - Blood (collected 08-28-23 @ 15:00)  Source: .Blood Blood-Peripheral  Preliminary Report (08-29-23 @ 21:01):    No growth at 24 hours    Rapid RVP Result: NotDetec (08-28 @ 15:00)    RADIOLOGY: < from: MR Head No Cont (08.30.23 @ 12:06) >    IMPRESSION: No acute intracranial hemorrhage or evidence of acute ischemia.   Similar-appearing moderate severity chronic white matter microvascular type changes.    < from: Transthoracic Echocardiogram (08.29.23 @ 07:46) >  CONCLUSIONS:  1. Normal mitral valve.  2. Calcified trileaflet aortic valvewith normal opening. Mild aortic regurgitation.  3. Aortic Root: 2.6 cm.  4. Normal left atrium.  LA volume index = 29 cc/m2.  5. Normal left ventricular internal dimensions and wall thicknesses.  6. Normal Left Ventricular Systolic Function,  (EF = 55 to 60%)  7. Grade I diastolic dysfunction (Impaired relaxation, mild).  8. Normal right atrium.  9. Normal right ventricular size and systolic function (TAPSE  1.8cm).  10. RA Pressure is 8 mm Hg.  11. RV systolic pressure is mildly increased at  44 mm Hg.  12. There is mild tricuspid regurgitation.  13. There is trace pulmonic regurgitation.  14. Normal pericardium with no pericardial effusion.    ANTIBIOTICS: N/A     IMPRESSION AND PLAN:  78 yo female with HTN, DM type 2, CAD s/p stent, CKD 4, HLD, PUD admitted currently after syncopal episode +/- Hypoglycemia undergoing garces.  ID called for + blood cx 1/4 bottles + CoNS (Staph Hominis )on admission 8/28.  No fever, no leukocytosis, no evidence of infectious process.   Undergoing Garces by Neuro, today EEG, no new acute ischemia on Brain MRI.  ECHO reviewed, G1DD, EF 55-60 %    -Syncope vs TIA  -Hypoglycemia (glycemia 52 upon arrival, FS 45) likely 2/2 Insulin and DM meds & worsening CKD  -Blood culture contamination  -DM  -CKD-4      PLAN:  No indication for abx currently, no infectious diseases process identified.  Close glycemia control and adjustment of DM meds in the setting of progressive CKD.  Rest as per primary   Discussed with Dr. Baer    Please reach ID with any questions or concerns  Dr. Ashleigh Aggarwal  Available in Teams

## 2023-08-30 NOTE — PROGRESS NOTE ADULT - ASSESSMENT
77M PMhx HTN, CAD, Depression, Type 2 DM, CKD, pulmonary fibrosis presents s/p syncopal episode at home 2 days ago, generalized body aches, and headache for the past 5 days. Pt. admits that her PO intake decreased. No recent travel or sick contacts. Vitals: BP 170s-158/80s > 130s after resuming her BP meds.  Labs significant ofr a POCT 45 > , , Cr 1.78 (baseline 1.3-1.5). Trop neg, UA neg, RVP neg. CTH/C-spine/C/Ab/P wnl. CXR and Pelvis Xray wnl. Admitted to telemetry for generalized weakness with questionable syncope.      77F PMhx HTN, CAD, Depression, Type 2 DM, CKD, pulmonary fibrosis presents s/p syncopal episode at home 2 days ago, generalized body aches, and headache for the past 5 days. Pt. admits that her PO intake decreased. No recent travel or sick contacts. Vitals: BP 170s-158/80s > 130s after resuming her BP meds.  Labs significant of a POCT 45 > ,  > 635, Cr 1.78 > 1.99 (baseline 1.3-1.5). Trop neg, UA neg, RVP neg. CTH/C-spine/C/Ab/P wnl. CXR and Pelvis Xray wnl. Echo showed grade I diastolic dysfunction w/ normal EF (55-60%). MRI showed_______. EEG showed__________. Admitted to telemetry for generalized weakness with questionable syncope.      77F w pmh of HTN, CAD, Depression, Type 2 DM, CKD, and pulmonary fibrosis presented to ED on 8/23 after a syncopal episode at home. Pt was subsequently admitted to telemetry for syncope workup and management.        2 days ago, generalized body aches, and headache for the past 5 days. Pt. admits that her PO intake decreased. No recent travel or sick contacts. Vitals: BP 170s-158/80s > 130s after resuming her BP meds.  Labs significant of a POCT 45 > ,  > 635, Cr 1.78 > 1.99 (baseline 1.3-1.5). Trop neg, UA neg, RVP neg. CTH/C-spine/C/Ab/P wnl. CXR and Pelvis Xray wnl. Echo showed grade I diastolic dysfunction w/ normal EF (55-60%). MRI showed_______. EEG showed__________.      77F w pmh of HTN, CAD, Depression, Type 2 DM, CKD, and pulmonary fibrosis presented to ED on 8/23 after a syncopal episode at home. Pt was subsequently admitted to telemetry for syncope workup and management.        2 days ago, generalized body aches, and headache for the past 5 days. Pt. admits that her PO intake decreased. No recent travel or sick contacts. Vitals: BP 170s-158/80s > 130s after resuming her BP meds.  Labs significant of a POCT 45 > ,  > 635 > 632, Cr 1.78 > 1.99 (baseline 1.3-1.5). Trop neg, UA neg, RVP neg. CTH/C-spine/C/Ab/P wnl. CXR and Pelvis Xray wnl. Echo showed grade I diastolic dysfunction w/ normal EF (55-60%). MRI brain/EEG wnl.

## 2023-08-30 NOTE — PROGRESS NOTE ADULT - PROBLEM SELECTOR PLAN 8
- hx of HLD on statin  - c.w home med - hx of HLD on statin  - c/w home med Pt w pmh of HLD on statin  - c/w home med

## 2023-08-30 NOTE — PROGRESS NOTE ADULT - PROBLEM SELECTOR PLAN 7
- hx of DM on glimepiride and Lantus at home  - c/w ISS, pt has episode of hypoglycemia to 45, corrected with dextrose   - f/u  A1c  - Endo Dr. Mota consulted - hx of DM on glimepiride and Lantus at home  - c/w ISS, pt has episode of hypoglycemia to 45, corrected with dextrose   - f/u  A1c  - Endo Dr. Mota consulted, recommends d/c glimepiride due to risk of hypoglycemia in elderly Pt w pmh of DM on glimepiride and Lantus at home  - c/w ISS, pt has episode of hypoglycemia to 45, corrected with dextrose   - f/u  A1c  - Endo Dr. Mota consulted, recommended to d/c glimepiride due to risk of hypoglycemia in elderly Pt w pmh of DM on glimepiride and Lantus at home  - c/w ISS, pt has episode of hypoglycemia to 45, corrected with dextrose   - A1c: 6.8  - Endo Dr. Mota consulted, recommended to d/c glimepiride due to risk of hypoglycemia in elderly

## 2023-08-30 NOTE — PROGRESS NOTE ADULT - SUBJECTIVE AND OBJECTIVE BOX
PGY-1 Progress Note discussed with attending      PLEASE CONTACT ON CALL TEAM:  - On Call Team (Please refer to Poli) FROM 5:00 PM - 8:30PM  - Nightfloat Team FROM 8:30 -7:30 AM    INTERVAL HPI/OVERNIGHT EVENTS:       REVIEW OF SYSTEMS:  CONSTITUTIONAL: No fever, weight loss, or fatigue  RESPIRATORY: No cough, wheezing,  or hemoptysis; No shortness of breath  CARDIOVASCULAR: No chest pain, palpitations, dizziness, or leg swelling  GASTROINTESTINAL: No abdominal pain. No nausea, vomiting, or hematemesis; No diarrhea or constipation. No melena or hematochezia.  GENITOURINARY: No dysuria, hematuria, or urinary frequency  NEUROLOGICAL: No headaches, memory loss. No focal weakness, numbness, or tremors  SKIN: No itching, burning, or rashes    MEDICATIONS  (STANDING):  allopurinol 200 milliGRAM(s) Oral daily  ammonium lactate 12% Lotion 1 Application(s) Topical two times a day  aspirin  chewable 81 milliGRAM(s) Oral daily  atorvastatin 40 milliGRAM(s) Oral at bedtime  heparin   Injectable 5000 Unit(s) SubCutaneous every 12 hours  hydrALAZINE 10 milliGRAM(s) Oral three times a day  insulin lispro (ADMELOG) corrective regimen sliding scale   SubCutaneous three times a day before meals  insulin lispro (ADMELOG) corrective regimen sliding scale   SubCutaneous at bedtime  isosorbide   mononitrate ER Tablet (IMDUR) 30 milliGRAM(s) Oral daily  lactated ringers. 1000 milliLiter(s) (100 mL/Hr) IV Continuous <Continuous>  traZODone 50 milliGRAM(s) Oral daily    MEDICATIONS  (PRN):  acetaminophen     Tablet .. 650 milliGRAM(s) Oral every 6 hours PRN Temp greater or equal to 38C (100.4F), Mild Pain (1 - 3)      Vital Signs Last 24 Hrs  T(C): 37.7 (30 Aug 2023 05:13), Max: 37.7 (30 Aug 2023 05:13)  T(F): 99.9 (30 Aug 2023 05:13), Max: 99.9 (30 Aug 2023 05:13)  HR: 102 (30 Aug 2023 05:13) (99 - 119)  BP: 128/68 (30 Aug 2023 05:13) (115/70 - 154/82)  BP(mean): --  RR: 18 (30 Aug 2023 05:13) (18 - 18)  SpO2: 95% (30 Aug 2023 05:13) (94% - 98%)    Parameters below as of 30 Aug 2023 02:03  Patient On (Oxygen Delivery Method): room air        PHYSICAL EXAMINATION:  GENERAL: NAD, lying comfortably in bed  HEAD:  Atraumatic, Normocephalic  EYES:  Conjunctiva and sclera clear  NECK: Supple. No JVD. Normal thyroid  CHEST/LUNG: Clear to auscultation. No rales or wheezing  HEART: Regular rate and rhythm. No abnormal heart sounds  ABDOMEN: Soft, nontender, and nondistended. Bowel sounds present. No pain or masses on palpation  NERVOUS SYSTEM:  Alert & Oriented X3  EXTREMITIES:  2+ Peripheral Pulses. No appreciable edema  SKIN: warm dry                          11.0   8.99  )-----------( 238      ( 29 Aug 2023 06:10 )             34.1     08-29    140  |  106  |  24<H>  ----------------------------<  122<H>  4.2   |  26  |  1.78<H>    Ca    9.3      29 Aug 2023 06:10  Phos  3.2     08-29  Mg     2.5     08-29    TPro  9.2<H>  /  Alb  3.9  /  TBili  0.4  /  DBili  x   /  AST  75<H>  /  ALT  24  /  AlkPhos  57  08-28    LIVER FUNCTIONS - ( 28 Aug 2023 15:00 )  Alb: 3.9 g/dL / Pro: 9.2 g/dL / ALK PHOS: 57 U/L / ALT: 24 U/L DA / AST: 75 U/L / GGT: x           CARDIAC MARKERS ( 29 Aug 2023 06:10 )  x     / x     / 635 U/L / x     / x      CARDIAC MARKERS ( 28 Aug 2023 15:00 )  x     / x     / 747 U/L / x     / x          PT/INR - ( 28 Aug 2023 16:00 )   PT: 10.7 sec;   INR: 0.94 ratio         PTT - ( 28 Aug 2023 16:00 )  PTT:35.3 sec    I&O's Summary        Culture - Blood (collected 28 Aug 2023 16:15)  Source: .Blood Blood-Peripheral  Gram Stain (29 Aug 2023 16:08):    Growth in anaerobic bottle: Gram Positive Cocci in Clusters  Preliminary Report (29 Aug 2023 16:08):    Growth in anaerobic bottle: Gram Positive Cocci in Clusters    Direct identification is available within approximately 3-5    hours either by Blood Panel Multiplexed PCR or Direct    MALDI-TOF. Details: [email or web address]  Organism: Blood Culture PCR (29 Aug 2023 17:15)  Organism: Blood Culture PCR (29 Aug 2023 17:15)    Culture - Urine (collected 28 Aug 2023 15:55)  Source: Clean Catch Clean Catch (Midstream)  Final Report (29 Aug 2023 18:22):    <10,000 CFU/mL Normal Urogenital Sear    Culture - Blood (collected 28 Aug 2023 15:00)  Source: .Blood Blood-Peripheral  Preliminary Report (29 Aug 2023 21:01):    No growth at 24 hours        CAPILLARY BLOOD GLUCOSE      RADIOLOGY & ADDITIONAL TESTS:                   PGY-1 Progress Note discussed with attending      PLEASE CONTACT ON CALL TEAM:  - On Call Team (Please refer to Poli) FROM 5:00 PM - 8:30PM  - Nightfloat Team FROM 8:30 -7:30 AM    INTERVAL HPI/OVERNIGHT EVENTS: Patient seen at bedside, denies any overnight events. She states she is feeling much improved, but since admission 2 days ago she has had a generalized itching which she says happens occasionally at home and improves with benadryl. She also endorses right sided headache, shortness of breath, and left hip pain.      REVIEW OF SYSTEMS:  CONSTITUTIONAL: No fever, weight loss, or fatigue  RESPIRATORY: + SOB; No cough, wheezing, or hemoptysis  CARDIOVASCULAR: No chest pain, palpitations, dizziness, or leg swelling  GASTROINTESTINAL: No abdominal pain. No nausea, vomiting, or hematemesis; No diarrhea or constipation. No melena or hematochezia.  GENITOURINARY: No dysuria, hematuria, or urinary frequency  MUSCULOSKELETAL: + hip pain  NEUROLOGICAL: + headache, no memory loss. No focal weakness, numbness, or tremors  SKIN: + itching, burning, or rashes    MEDICATIONS  (STANDING):  allopurinol 200 milliGRAM(s) Oral daily  ammonium lactate 12% Lotion 1 Application(s) Topical two times a day  aspirin  chewable 81 milliGRAM(s) Oral daily  atorvastatin 40 milliGRAM(s) Oral at bedtime  heparin   Injectable 5000 Unit(s) SubCutaneous every 12 hours  hydrALAZINE 10 milliGRAM(s) Oral three times a day  insulin lispro (ADMELOG) corrective regimen sliding scale   SubCutaneous three times a day before meals  insulin lispro (ADMELOG) corrective regimen sliding scale   SubCutaneous at bedtime  isosorbide   mononitrate ER Tablet (IMDUR) 30 milliGRAM(s) Oral daily  lactated ringers. 1000 milliLiter(s) (100 mL/Hr) IV Continuous <Continuous>  traZODone 50 milliGRAM(s) Oral daily    MEDICATIONS  (PRN):  acetaminophen     Tablet .. 650 milliGRAM(s) Oral every 6 hours PRN Temp greater or equal to 38C (100.4F), Mild Pain (1 - 3)      Vital Signs Last 24 Hrs  T(C): 37.7 (30 Aug 2023 05:13), Max: 37.7 (30 Aug 2023 05:13)  T(F): 99.9 (30 Aug 2023 05:13), Max: 99.9 (30 Aug 2023 05:13)  HR: 102 (30 Aug 2023 05:13) (99 - 119)  BP: 128/68 (30 Aug 2023 05:13) (115/70 - 154/82)  BP(mean): --  RR: 18 (30 Aug 2023 05:13) (18 - 18)  SpO2: 95% (30 Aug 2023 05:13) (94% - 98%)    Parameters below as of 30 Aug 2023 02:03  Patient On (Oxygen Delivery Method): room air        PHYSICAL EXAMINATION:  GENERAL: NAD, lying comfortably in bed, pleasant affect  HEAD:  Atraumatic, Normocephalic  EYES:  Conjunctiva and sclera clear  NECK: Supple. No JVD. Normal thyroid  CHEST/LUNG: + crackles B/L lung bases  HEART: + tachycardia, regular rhythm. No abnormal heart sounds  ABDOMEN: Soft, nontender, and nondistended. Bowel sounds present. No pain or masses on palpation  NERVOUS SYSTEM:  Alert & Oriented X3, CN II-XII grossly intact  EXTREMITIES:  2+ Peripheral Pulses. No appreciable edema  SKIN: warm dry, no rashes                          11.0   8.99  )-----------( 238      ( 29 Aug 2023 06:10 )             34.1     08-29    140  |  106  |  24<H>  ----------------------------<  122<H>  4.2   |  26  |  1.78<H>    Ca    9.3      29 Aug 2023 06:10  Phos  3.2     08-29  Mg     2.5     08-29    TPro  9.2<H>  /  Alb  3.9  /  TBili  0.4  /  DBili  x   /  AST  75<H>  /  ALT  24  /  AlkPhos  57  08-28    LIVER FUNCTIONS - ( 28 Aug 2023 15:00 )  Alb: 3.9 g/dL / Pro: 9.2 g/dL / ALK PHOS: 57 U/L / ALT: 24 U/L DA / AST: 75 U/L / GGT: x           CARDIAC MARKERS ( 29 Aug 2023 06:10 )  x     / x     / 635 U/L / x     / x      CARDIAC MARKERS ( 28 Aug 2023 15:00 )  x     / x     / 747 U/L / x     / x          PT/INR - ( 28 Aug 2023 16:00 )   PT: 10.7 sec;   INR: 0.94 ratio         PTT - ( 28 Aug 2023 16:00 )  PTT:35.3 sec    I&O's Summary        Culture - Blood (collected 28 Aug 2023 16:15)  Source: .Blood Blood-Peripheral  Gram Stain (29 Aug 2023 16:08):    Growth in anaerobic bottle: Gram Positive Cocci in Clusters  Preliminary Report (29 Aug 2023 16:08):    Growth in anaerobic bottle: Gram Positive Cocci in Clusters    Direct identification is available within approximately 3-5    hours either by Blood Panel Multiplexed PCR or Direct    MALDI-TOF. Details: [email or web address]  Organism: Blood Culture PCR (29 Aug 2023 17:15)  Organism: Blood Culture PCR (29 Aug 2023 17:15)    Culture - Urine (collected 28 Aug 2023 15:55)  Source: Clean Catch Clean Catch (Midstream)  Final Report (29 Aug 2023 18:22):    <10,000 CFU/mL Normal Urogenital Sera    Culture - Blood (collected 28 Aug 2023 15:00)  Source: .Blood Blood-Peripheral  Preliminary Report (29 Aug 2023 21:01):    No growth at 24 hours        CAPILLARY BLOOD GLUCOSE      RADIOLOGY & ADDITIONAL TESTS:

## 2023-08-30 NOTE — PROGRESS NOTE ADULT - PROBLEM SELECTOR PLAN 3
- Pt endorsed syncopal episode & losing consciousness & waking with slurred speech  - Orthostatic b/p  - CTH/C-spine/C/Ab/P wnl. CXR and Pelvis Xray wnl.   - CK elevated on adm 747 trending down 635  - f/u TTE  - F/U MRI brain & EEG  - Cardio Dr. Guardado consulted  - Neuro Dr. Izaguirre consulted - Pt endorsed syncopal episode & losing consciousness & waking with slurred speech  - Orthostatic b/p  - CTH/C-spine/C/Ab/P wnl. CXR and Pelvis Xray wnl.   - Echo showed grade I diastolic dysfunction w/ normal EF (55-60%)  - CK elevated on adm 747 trending down 635  - F/U MRI brain & EEG  - Cardio Dr. Guardado consulted  - Neuro Dr. Izaguirre consulted BCx from 8/28 growing gram + cocci in cluster  - final result: coagulase negative staph  - BCx reordered due to possible contamination/lack of constitutional sxs  - No wbc, afebrile, no identifiable source for now   - Hold abx until repeat BCx results are available   - ESR elevated at 67  - ID following, Dr. Rodriguez BCx from 8/28 growing gram + cocci in cluster  - final result: coagulase negative staph  - BCx reordered due to possible contamination/lack of constitutional sxs  - No wbc, afebrile, no identifiable source for now   - Hold abx until repeat BCx results are available   - ESR elevated at 67  - CRP elevated at 18  - ID following, Dr. Rodriguez

## 2023-08-30 NOTE — PROGRESS NOTE ADULT - PROBLEM SELECTOR PLAN 6
- hx of HTN on chlorthalidone, ISMN, valsartan, hydralazine  - continued on ISorsobide and hydralazine, hold ACE/thiazide in setting of DENIS   - f/u orthostatics Pt w pmh of HTN on chlorthalidone, valsartan, ISMN, and hydralazine   - continued on Isorsobide and hydralazine, hold ACE/thiazide in setting of DENIS   - Monitor vitals

## 2023-08-30 NOTE — PROGRESS NOTE ADULT - SUBJECTIVE AND OBJECTIVE BOX
[facility] - JUAN Woodruff / JUAN Bang / DONNY Asencio/ JUAN Segura/ JUAN Thornton/ ESSENCE Miguel / JANIE Palacio / CHUCKY Aquino  ---------------------------------------------------------------------------------------------------------------  seen and examined today for CKD  Interval : NAD  VITALS:  T(F): 98.9 (08-30-23 @ 10:13), Max: 99.9 (08-30-23 @ 05:13)  HR: 95 (08-30-23 @ 10:13)  BP: 131/75 (08-30-23 @ 10:13)  RR: 18 (08-30-23 @ 10:13)  SpO2: 95% (08-30-23 @ 10:13)  Wt(kg): --    Physical Exam :-  Constitutional: NAD  Neck: Supple.  Respiratory: Bilateral equal breath sounds,  Cardiovascular: S1, S2 normal,  Gastrointestinal: Bowel Sounds present, soft, non tender.  Extremities: No edema  Neurological: Alert and Oriented x 3, no focal deficits  Psychiatric: Normal mood, normal affect  Data:-  Allergies :   penicillin (Swelling)    Hospital Medications:   MEDICATIONS  (STANDING):  allopurinol 200 milliGRAM(s) Oral daily  ammonium lactate 12% Lotion 1 Application(s) Topical two times a day  aspirin  chewable 81 milliGRAM(s) Oral daily  atorvastatin 40 milliGRAM(s) Oral at bedtime  heparin   Injectable 5000 Unit(s) SubCutaneous every 12 hours  hydrALAZINE 10 milliGRAM(s) Oral three times a day  insulin lispro (ADMELOG) corrective regimen sliding scale   SubCutaneous three times a day before meals  insulin lispro (ADMELOG) corrective regimen sliding scale   SubCutaneous at bedtime  isosorbide   mononitrate ER Tablet (IMDUR) 30 milliGRAM(s) Oral daily  lactated ringers. 1000 milliLiter(s) (100 mL/Hr) IV Continuous <Continuous>  lidocaine   4% Patch 1 Patch Transdermal every 24 hours  traZODone 50 milliGRAM(s) Oral daily    08-30    135  |  105  |  33<H>  ----------------------------<  141<H>  5.3   |  23  |  1.99<H>    Ca    9.1      30 Aug 2023 07:10  Phos  3.2     08-29  Mg     2.5     08-29    TPro  8.1  /  Alb  3.5  /  TBili  0.6  /  DBili      /  AST  53<H>  /  ALT  23  /  AlkPhos  52  08-30    Creatinine Trend: 1.99 <--, 1.78 <--, 1.78 <--                        11.2   8.06  )-----------( Clumped    ( 30 Aug 2023 07:10 )             34.4

## 2023-08-30 NOTE — PROGRESS NOTE ADULT - SUBJECTIVE AND OBJECTIVE BOX
Subjective:  Chart Notes, Work list Manager, and fingersticks reviewed. no acute overnight events, pt. seen and examined at bedside, offers no complaints.    Review of Systems:  Constitutional: No fever  Eyes: No blurry vision  Neuro: No tremors  HEENT: No pain  Cardiovascular: No chest pain, palpitations  Respiratory: No SOB, no cough  GI: No nausea, vomiting, abdominal pain  : No dysuria  Skin: no rash  Psych: no depression  Endocrine: no polyuria, polydipsia  Hem/lymph: no swelling  Osteoporosis: no fractures    ALL OTHER SYSTEMS REVIEWED AND NEGATIVE      MEDICATIONS  (STANDING):  allopurinol 200 milliGRAM(s) Oral daily  ammonium lactate 12% Lotion 1 Application(s) Topical two times a day  aspirin  chewable 81 milliGRAM(s) Oral daily  atorvastatin 40 milliGRAM(s) Oral at bedtime  heparin   Injectable 5000 Unit(s) SubCutaneous every 12 hours  hydrALAZINE 10 milliGRAM(s) Oral three times a day  insulin lispro (ADMELOG) corrective regimen sliding scale   SubCutaneous three times a day before meals  insulin lispro (ADMELOG) corrective regimen sliding scale   SubCutaneous at bedtime  isosorbide   mononitrate ER Tablet (IMDUR) 30 milliGRAM(s) Oral daily  lactated ringers. 1000 milliLiter(s) (100 mL/Hr) IV Continuous <Continuous>  lidocaine   4% Patch 1 Patch Transdermal every 24 hours  traZODone 50 milliGRAM(s) Oral daily    MEDICATIONS  (PRN):  acetaminophen     Tablet .. 650 milliGRAM(s) Oral every 6 hours PRN Temp greater or equal to 38C (100.4F), Mild Pain (1 - 3)  diphenhydrAMINE 25 milliGRAM(s) Oral every 6 hours PRN Rash and/or Itching      PHYSICAL EXAM:  VITALS: T(C): 37.2 (08-30-23 @ 10:13)  T(F): 98.9 (08-30-23 @ 10:13), Max: 99.9 (08-30-23 @ 05:13)  HR: 95 (08-30-23 @ 10:13) (95 - 119)  BP: 131/75 (08-30-23 @ 10:13) (115/70 - 154/82)  RR:  (18 - 18)  SpO2:  (94% - 98%)    GENERAL: NAD  HEENT: Normocephalic;  conjunctivae and sclerae clear; moist mucous membranes;   NECK : supple, no thyromegaly  CHEST/LUNG: Clear to auscultation bilaterally with good air entry   HEART: S1 S2  regular; no murmurs, gallops or rubs  ABDOMEN: Soft, Nontender, Nondistended; Bowel sounds present  EXTREMITIES: no cyanosis; no edema; no calf tenderness  SKIN: warm and dry; no rash  NERVOUS SYSTEM:  Awake and alert; Oriented  to place, person and time ; no new deficits, sensation in feet intact.       CAPILLARY BLOOD GLUCOSE      POCT Blood Glucose.: 148 mg/dL (30 Aug 2023 07:49)  POCT Blood Glucose.: 214 mg/dL (29 Aug 2023 21:38)  POCT Blood Glucose.: 146 mg/dL (29 Aug 2023 16:57)  POCT Blood Glucose.: 277 mg/dL (29 Aug 2023 12:57)    08-30    135  |  105  |  33<H>  ----------------------------<  141<H>  5.3   |  23  |  1.99<H>    eGFR: 26<L>    Ca    9.1      08-30  Mg     2.5     08-29  Phos  3.2     08-29    TPro  8.1  /  Alb  3.5  /  TBili  0.6  /  DBili  x   /  AST  53<H>  /  ALT  23  /  AlkPhos  52  08-30    Thyroid Function Tests:  08-30 @ 07:10 TSH 2.42 FreeT4 -- T3 -- Anti TPO -- Anti Thyroglobulin Ab -- TSI --    Hemoglobin A1C, Whole Blood: 10.9 % (04.21.18 @ 09:25)    Assessment and Plan:  77M w PMhx HTN, CAD, Depression, Type 2 DM (on glimepiride and Lantus at home), CKD, pulmonary fibrosis p/w syncopal episode at home 2 days prior, generalized body aches, and headache for the past 5 days. Pt admitted for generalized weakness and syncope workup. Pt was found to have hypoglycemia with blood sugar to 45, corrected with dextrose. Endo was consulted for hypoglycemia.     1) Hypoglycemia due to sulfonylurea and insulin use  2) Type 2 diabetes:    Pt with decreased oral intake for the past 5 days due to generalized weakness. Pt states that she takes 92 U of lantus at bedtime and glimepiride twice a day. She states that she has been having low fasting glucose levels to the 50s. She also had a hospitalization in 2018 where she also had an episode of hypoglycemia. Currently the Blood glucose levels improved and measures in the 200s range.   Patient reports fair appetite  Pt with worsening kidney function with eGFR 26. Nephro on board    Inpatient Recommendations:  Basal Insulin:   Hold off Lantus for now since fasting blood glucose is still at goal  May start Lantus as Fasting blood glucose start to rise    Nutritional Insulin:  Hold off lispro for now    Correctional Insulin:  continue with Moderate Lispro ( Admelog) correctional scale with meals and bedtime    Oral Diabetes Medications:  None in the hospital    follow up A1C level    Outpatient diabetic medications will be changed based on the A1C levels and the pt insulin requirements in the hospital prior to discharge.     Please, discontinue Glimepiride on discharge due to risk of hypoglycemia in the elderly population.   Avoid Metformin on discharge due to worsening kidney function and low eGFR of 26.   Avoid Sulfonylurea on discharge due to risk of hypoglycemia in the elderly population             Subjective:  Chart Notes, Work list Manager, and fingersticks reviewed. no acute overnight events, pt. seen and examined at bedside, offers no complaints.    Review of Systems:  Constitutional: No fever  Eyes: No blurry vision  Cardiovascular: No chest pain, palpitations  Respiratory: No SOB, no cough  GI: No nausea, vomiting, abdominal pain  Endocrine: no polyuria, polydipsia    MEDICATIONS  (STANDING):  allopurinol 200 milliGRAM(s) Oral daily  ammonium lactate 12% Lotion 1 Application(s) Topical two times a day  aspirin  chewable 81 milliGRAM(s) Oral daily  atorvastatin 40 milliGRAM(s) Oral at bedtime  heparin   Injectable 5000 Unit(s) SubCutaneous every 12 hours  hydrALAZINE 10 milliGRAM(s) Oral three times a day  insulin lispro (ADMELOG) corrective regimen sliding scale   SubCutaneous three times a day before meals  insulin lispro (ADMELOG) corrective regimen sliding scale   SubCutaneous at bedtime  isosorbide   mononitrate ER Tablet (IMDUR) 30 milliGRAM(s) Oral daily  lactated ringers. 1000 milliLiter(s) (100 mL/Hr) IV Continuous <Continuous>  lidocaine   4% Patch 1 Patch Transdermal every 24 hours  traZODone 50 milliGRAM(s) Oral daily    MEDICATIONS  (PRN):  acetaminophen     Tablet .. 650 milliGRAM(s) Oral every 6 hours PRN Temp greater or equal to 38C (100.4F), Mild Pain (1 - 3)  diphenhydrAMINE 25 milliGRAM(s) Oral every 6 hours PRN Rash and/or Itching      PHYSICAL EXAM:  VITALS: T(C): 37.2 (08-30-23 @ 10:13)  T(F): 98.9 (08-30-23 @ 10:13), Max: 99.9 (08-30-23 @ 05:13)  HR: 95 (08-30-23 @ 10:13) (95 - 119)  BP: 131/75 (08-30-23 @ 10:13) (115/70 - 154/82)  RR:  (18 - 18)  SpO2:  (94% - 98%)    GENERAL: NAD  HEENT: Normocephalic;  conjunctivae and sclerae clear; moist mucous membranes;   NECK : supple, no thyromegaly  CHEST/LUNG: Clear to auscultation bilaterally with good air entry   HEART: S1 S2  regular; no murmurs, gallops or rubs  ABDOMEN: Soft, Nontender, Nondistended; Bowel sounds present  EXTREMITIES: no cyanosis; no edema; no calf tenderness  SKIN: warm and dry; no rash  NERVOUS SYSTEM:  Awake and alert; Oriented  to place, person and time ; no new deficits, sensation in feet intact.     Labs:    Capillary Blood Glucose:  POCT Blood Glucose.: 148 mg/dL (30 Aug 2023 07:49)  POCT Blood Glucose.: 214 mg/dL (29 Aug 2023 21:38)  POCT Blood Glucose.: 146 mg/dL (29 Aug 2023 16:57)  POCT Blood Glucose.: 277 mg/dL (29 Aug 2023 12:57)    08-30  135  |  105  |  33<H>  ----------------------------<  141<H>  5.3   |  23  |  1.99<H>  eGFR: 26<L>  Ca    9.1      08-30  Mg     2.5     08-29  Phos  3.2     08-29    TPro  8.1  /  Alb  3.5  /  TBili  0.6  /  DBili  x   /  AST  53<H>  /  ALT  23  /  AlkPhos  52  08-30    Thyroid Function Tests:  08-30 @ 07:10 TSH 2.42 FreeT4 -- T3 -- Anti TPO -- Anti Thyroglobulin Ab -- TSI --    Hemoglobin A1C, Whole Blood: 10.9 % (04.21.18 @ 09:25)    Assessment and Plan:  77M w PMhx HTN, CAD, Depression, Type 2 DM (on glimepiride and Lantus at home), CKD, pulmonary fibrosis p/w syncopal episode at home 2 days prior, generalized body aches, and headache for the past 5 days. Pt admitted for generalized weakness and syncope workup. Pt was found to have hypoglycemia with blood sugar to 45, corrected with dextrose. Endo was consulted for hypoglycemia.     1) Hypoglycemia due to sulfonylurea and insulin use  2) Type 2 diabetes:    Pt with decreased oral intake for the past 5 days due to generalized weakness. Pt states that she takes 92 U of lantus at bedtime and glimepiride twice a day. She states that she has been having low fasting glucose levels to the 50s. She also had a hospitalization in 2018 where she also had an episode of hypoglycemia. Currently the Blood glucose levels improved and measures in the 200s range.   Patient reports fair appetite  Pt with worsening kidney function with eGFR 26. Nephro on board    Inpatient Recommendations:  Basal Insulin:   Hold off Lantus for now since fasting blood glucose is still at goal  May start Lantus as Fasting blood glucose start to rise    Nutritional Insulin:  Hold off lispro for now    Correctional Insulin:  continue with Moderate Lispro ( Admelog) correctional scale with meals and bedtime    Oral Diabetes Medications:  None in the hospital    follow up A1C level    Outpatient diabetic medications will be changed based on the A1C levels and the pt insulin requirements in the hospital prior to discharge.     Please, discontinue Glimepiride on discharge due to risk of hypoglycemia in the elderly population.   Avoid Metformin on discharge due to worsening kidney function and low eGFR of 26.   Avoid Sulfonylurea on discharge due to risk of hypoglycemia in the elderly population

## 2023-08-30 NOTE — PROGRESS NOTE ADULT - PROBLEM SELECTOR PLAN 11
- hx of depression on trazodone  - c/w home med Pt w pmh of lung disease (likely pulm fibrosis) on ofev  - not in formulary, pt not in exacerbation  - will continue to monitor

## 2023-08-30 NOTE — PROGRESS NOTE ADULT - PROBLEM SELECTOR PLAN 4
- found to have DENIS on CKD Cr 1.78 (baseline 1.3-1.5)  - gentle hydration  - f/u urine lytes, & renal US  - hold pt's ACE/thiazide  - Monitor Scr - found to have DENIS on CKD Cr 1.78 > 1.99 (baseline 1.3-1.5)  - gentle hydration w/ LR  - f/u urine lytes, & renal US  - hold pt's ACE/thiazide  - Monitor Scr Pt found to have DENIS on CKD Cr 1.78 > 1.99 (baseline 1.3-1.5)  - gentle hydration w/ LR  - f/u urine lytes, & renal US  - hold pt's ACE/thiazide  - Monitor Scr

## 2023-08-31 ENCOUNTER — TRANSCRIPTION ENCOUNTER (OUTPATIENT)
Age: 78
End: 2023-08-31

## 2023-08-31 LAB
ALBUMIN SERPL ELPH-MCNC: 3.2 G/DL — LOW (ref 3.5–5)
ALP SERPL-CCNC: 48 U/L — SIGNIFICANT CHANGE UP (ref 40–120)
ALT FLD-CCNC: 21 U/L DA — SIGNIFICANT CHANGE UP (ref 10–60)
ANION GAP SERPL CALC-SCNC: 6 MMOL/L — SIGNIFICANT CHANGE UP (ref 5–17)
APPEARANCE UR: CLEAR — SIGNIFICANT CHANGE UP
AST SERPL-CCNC: 21 U/L — SIGNIFICANT CHANGE UP (ref 10–40)
BASOPHILS # BLD AUTO: 0.06 K/UL — SIGNIFICANT CHANGE UP (ref 0–0.2)
BASOPHILS NFR BLD AUTO: 0.9 % — SIGNIFICANT CHANGE UP (ref 0–2)
BILIRUB SERPL-MCNC: 0.3 MG/DL — SIGNIFICANT CHANGE UP (ref 0.2–1.2)
BILIRUB UR-MCNC: NEGATIVE — SIGNIFICANT CHANGE UP
BUN SERPL-MCNC: 33 MG/DL — HIGH (ref 7–18)
CALCIUM SERPL-MCNC: 8.8 MG/DL — SIGNIFICANT CHANGE UP (ref 8.4–10.5)
CHLORIDE SERPL-SCNC: 109 MMOL/L — HIGH (ref 96–108)
CK SERPL-CCNC: 317 U/L — HIGH (ref 21–215)
CO2 SERPL-SCNC: 26 MMOL/L — SIGNIFICANT CHANGE UP (ref 22–31)
COLOR SPEC: YELLOW — SIGNIFICANT CHANGE UP
CREAT SERPL-MCNC: 1.8 MG/DL — HIGH (ref 0.5–1.3)
DIFF PNL FLD: NEGATIVE — SIGNIFICANT CHANGE UP
EGFR: 29 ML/MIN/1.73M2 — LOW
EOSINOPHIL # BLD AUTO: 0.39 K/UL — SIGNIFICANT CHANGE UP (ref 0–0.5)
EOSINOPHIL NFR BLD AUTO: 6.1 % — HIGH (ref 0–6)
GLUCOSE BLDC GLUCOMTR-MCNC: 132 MG/DL — HIGH (ref 70–99)
GLUCOSE BLDC GLUCOMTR-MCNC: 161 MG/DL — HIGH (ref 70–99)
GLUCOSE BLDC GLUCOMTR-MCNC: 164 MG/DL — HIGH (ref 70–99)
GLUCOSE BLDC GLUCOMTR-MCNC: 173 MG/DL — HIGH (ref 70–99)
GLUCOSE SERPL-MCNC: 149 MG/DL — HIGH (ref 70–99)
GLUCOSE UR QL: NEGATIVE MG/DL — SIGNIFICANT CHANGE UP
HCT VFR BLD CALC: 30.2 % — LOW (ref 34.5–45)
HGB BLD-MCNC: 9.5 G/DL — LOW (ref 11.5–15.5)
IMM GRANULOCYTES NFR BLD AUTO: 0.2 % — SIGNIFICANT CHANGE UP (ref 0–0.9)
KETONES UR-MCNC: NEGATIVE MG/DL — SIGNIFICANT CHANGE UP
LEUKOCYTE ESTERASE UR-ACNC: NEGATIVE — SIGNIFICANT CHANGE UP
LYMPHOCYTES # BLD AUTO: 1.79 K/UL — SIGNIFICANT CHANGE UP (ref 1–3.3)
LYMPHOCYTES # BLD AUTO: 28 % — SIGNIFICANT CHANGE UP (ref 13–44)
MAGNESIUM SERPL-MCNC: 2.5 MG/DL — SIGNIFICANT CHANGE UP (ref 1.6–2.6)
MCHC RBC-ENTMCNC: 30.8 PG — SIGNIFICANT CHANGE UP (ref 27–34)
MCHC RBC-ENTMCNC: 31.5 GM/DL — LOW (ref 32–36)
MCV RBC AUTO: 98.1 FL — SIGNIFICANT CHANGE UP (ref 80–100)
MONOCYTES # BLD AUTO: 0.81 K/UL — SIGNIFICANT CHANGE UP (ref 0–0.9)
MONOCYTES NFR BLD AUTO: 12.7 % — SIGNIFICANT CHANGE UP (ref 2–14)
NEUTROPHILS # BLD AUTO: 3.33 K/UL — SIGNIFICANT CHANGE UP (ref 1.8–7.4)
NEUTROPHILS NFR BLD AUTO: 52.1 % — SIGNIFICANT CHANGE UP (ref 43–77)
NITRITE UR-MCNC: NEGATIVE — SIGNIFICANT CHANGE UP
NRBC # BLD: 0 /100 WBCS — SIGNIFICANT CHANGE UP (ref 0–0)
PH UR: 5.5 — SIGNIFICANT CHANGE UP (ref 5–8)
PHOSPHATE SERPL-MCNC: 3.4 MG/DL — SIGNIFICANT CHANGE UP (ref 2.5–4.5)
PLATELET # BLD AUTO: 221 K/UL — SIGNIFICANT CHANGE UP (ref 150–400)
POTASSIUM SERPL-MCNC: 4.2 MMOL/L — SIGNIFICANT CHANGE UP (ref 3.5–5.3)
POTASSIUM SERPL-SCNC: 4.2 MMOL/L — SIGNIFICANT CHANGE UP (ref 3.5–5.3)
PROT SERPL-MCNC: 7 G/DL — SIGNIFICANT CHANGE UP (ref 6–8.3)
PROT UR-MCNC: NEGATIVE MG/DL — SIGNIFICANT CHANGE UP
RAPID RVP RESULT: SIGNIFICANT CHANGE UP
RBC # BLD: 3.08 M/UL — LOW (ref 3.8–5.2)
RBC # FLD: 14.8 % — HIGH (ref 10.3–14.5)
SARS-COV-2 RNA SPEC QL NAA+PROBE: SIGNIFICANT CHANGE UP
SODIUM SERPL-SCNC: 141 MMOL/L — SIGNIFICANT CHANGE UP (ref 135–145)
SP GR SPEC: 1.01 — SIGNIFICANT CHANGE UP (ref 1–1.03)
UROBILINOGEN FLD QL: 0.2 MG/DL — SIGNIFICANT CHANGE UP (ref 0.2–1)
WBC # BLD: 6.39 K/UL — SIGNIFICANT CHANGE UP (ref 3.8–10.5)
WBC # FLD AUTO: 6.39 K/UL — SIGNIFICANT CHANGE UP (ref 3.8–10.5)

## 2023-08-31 PROCEDURE — 99232 SBSQ HOSP IP/OBS MODERATE 35: CPT

## 2023-08-31 PROCEDURE — 99233 SBSQ HOSP IP/OBS HIGH 50: CPT | Mod: GC

## 2023-08-31 PROCEDURE — 71045 X-RAY EXAM CHEST 1 VIEW: CPT | Mod: 26

## 2023-08-31 RX ORDER — ALLOPURINOL 300 MG
100 TABLET ORAL DAILY
Refills: 0 | Status: DISCONTINUED | OUTPATIENT
Start: 2023-08-31 | End: 2023-09-01

## 2023-08-31 RX ORDER — MECLIZINE HCL 12.5 MG
12.5 TABLET ORAL DAILY
Refills: 0 | Status: DISCONTINUED | OUTPATIENT
Start: 2023-08-31 | End: 2023-09-01

## 2023-08-31 RX ADMIN — Medication 10 MILLIGRAM(S): at 13:11

## 2023-08-31 RX ADMIN — Medication 2: at 07:59

## 2023-08-31 RX ADMIN — ISOSORBIDE MONONITRATE 30 MILLIGRAM(S): 60 TABLET, EXTENDED RELEASE ORAL at 13:09

## 2023-08-31 RX ADMIN — ATORVASTATIN CALCIUM 40 MILLIGRAM(S): 80 TABLET, FILM COATED ORAL at 21:27

## 2023-08-31 RX ADMIN — Medication 1 APPLICATION(S): at 17:44

## 2023-08-31 RX ADMIN — Medication 650 MILLIGRAM(S): at 19:30

## 2023-08-31 RX ADMIN — Medication 650 MILLIGRAM(S): at 21:26

## 2023-08-31 RX ADMIN — Medication 100 MILLIGRAM(S): at 13:09

## 2023-08-31 RX ADMIN — Medication 10 MILLIGRAM(S): at 22:18

## 2023-08-31 RX ADMIN — Medication 81 MILLIGRAM(S): at 13:09

## 2023-08-31 RX ADMIN — LIDOCAINE 1 PATCH: 4 CREAM TOPICAL at 13:08

## 2023-08-31 RX ADMIN — HEPARIN SODIUM 5000 UNIT(S): 5000 INJECTION INTRAVENOUS; SUBCUTANEOUS at 17:44

## 2023-08-31 RX ADMIN — Medication 10 MILLIGRAM(S): at 05:56

## 2023-08-31 RX ADMIN — Medication 1 APPLICATION(S): at 05:57

## 2023-08-31 RX ADMIN — HEPARIN SODIUM 5000 UNIT(S): 5000 INJECTION INTRAVENOUS; SUBCUTANEOUS at 05:56

## 2023-08-31 RX ADMIN — Medication 2: at 12:14

## 2023-08-31 RX ADMIN — Medication 650 MILLIGRAM(S): at 23:38

## 2023-08-31 RX ADMIN — Medication 650 MILLIGRAM(S): at 08:02

## 2023-08-31 RX ADMIN — Medication 25 MILLIGRAM(S): at 21:27

## 2023-08-31 RX ADMIN — LIDOCAINE 1 PATCH: 4 CREAM TOPICAL at 19:45

## 2023-08-31 RX ADMIN — LIDOCAINE 1 PATCH: 4 CREAM TOPICAL at 00:27

## 2023-08-31 NOTE — PROGRESS NOTE ADULT - SUBJECTIVE AND OBJECTIVE BOX
Subjective:  Chart Notes, Work list Manager, and fingersticks reviewed. Patient reports feeling better, denies any major complaints    Review of Systems:  Constitutional: No fever  Eyes: No blurry vision  Neuro: No tremors  HEENT: No pain  Cardiovascular: No chest pain, palpitations  Respiratory: No SOB, no cough  GI: No nausea, vomiting, abdominal pain  Endocrine: no polyuria, polydipsia    Medications  (Standing):  allopurinol 100 milliGRAM(s) Oral daily  ammonium lactate 12% Lotion 1 Application(s) Topical two times a day  aspirin  chewable 81 milliGRAM(s) Oral daily  atorvastatin 40 milliGRAM(s) Oral at bedtime  heparin   Injectable 5000 Unit(s) SubCutaneous every 12 hours  hydrALAZINE 10 milliGRAM(s) Oral three times a day  insulin lispro (ADMELOG) corrective regimen sliding scale   SubCutaneous three times a day before meals  insulin lispro (ADMELOG) corrective regimen sliding scale   SubCutaneous at bedtime  isosorbide   mononitrate ER Tablet (IMDUR) 30 milliGRAM(s) Oral daily  lactated ringers. 1000 milliLiter(s) (100 mL/Hr) IV Continuous <Continuous>  lidocaine   4% Patch 1 Patch Transdermal every 24 hours  traZODone 25 milliGRAM(s) Oral at bedtime    Medications (PRN):  acetaminophen     Tablet .. 650 milliGRAM(s) Oral every 6 hours PRN Temp greater or equal to 38C (100.4F), Mild Pain (1 - 3)  diphenhydrAMINE 25 milliGRAM(s) Oral every 6 hours PRN Rash and/or Itching  meclizine 12.5 milliGRAM(s) Oral daily PRN Dizziness    Physical examination:  VITALS: T(C): 37.1 (08-31-23 @ 14:35)  T(F): 98.8 (08-31-23 @ 14:35), Max: 100 (08-30-23 @ 21:12)  HR: 85 (08-31-23 @ 14:35) (84 - 95)  BP: 109/62 (08-31-23 @ 14:35) (109/62 - 133/70)  RR:  (16 - 18)  SpO2:  (96% - 99%)    GENERAL: NAD,   HEENT: Dry mucous membranes  THYROID: Normal size, no palpable nodules  RESPIRATORY: Clear to auscultation bilaterally; No rales, rhonchi, wheezing  CARDIOVASCULAR: Regular rate and rhythm; No murmurs; no peripheral edema  GI: Soft, nontender, non distended, +ve abdominal obesity  EXTREMITIES: +ve peripheral pulses, -ve pedal edema  SKIN: Dry, intact, No rashes or lesions  PSYCH: Alert and oriented x 3    Labs:  Capillary Blood Glucose:  POCT Blood Glucose.: 164 mg/dL (31 Aug 2023 11:28)  POCT Blood Glucose.: 161 mg/dL (31 Aug 2023 07:42)  POCT Blood Glucose.: 206 mg/dL (30 Aug 2023 22:23)  POCT Blood Glucose.: 197 mg/dL (30 Aug 2023 17:01)    08-31  141  |  109<H>  |  33<H>  ----------------------------<  149<H>  4.2   |  26  |  1.80<H>  eGFR: 29<L>  Ca    8.8      08-31  Mg     2.5     08-31  Phos  3.4     08-31    A1C with Estimated Average Glucose Result: 6.8. % (08.30.23 @ 07:10)    Assessment and Plan:    77M w PMhx HTN, CAD, Depression, Type 2 DM (on glimepiride and Lantus at home), CKD, pulmonary fibrosis p/w syncopal episode at home 2 days prior, generalized body aches, and headache for the past 5 days. Pt admitted for generalized weakness and syncope workup. Pt was found to have hypoglycemia with blood sugar to 45, corrected with dextrose. Endo was consulted for hypoglycemia.     1) Hypoglycemia due to sulfonylurea  2) Hypoglycemia due to insulin  3) Type 2 diabetes:  4) Diabetic nephropathy    Blood sugars are better controlled, no episodes of hypoglycemia  Patient is eating well    Inpatient Recommendations:  - Basal Insulin:   None    - Nutritional Insulin:  None    - Correctional (Sliding) Insulin:  Continue low Lispro (Admelog) sliding scale with meals and bedtime    - Oral Medications:  None in the hospital    Discharge Recommendations:    1) STOP GLIMEPIRIDE  2) STOP LANTUS  3) Prescribe Actos        Subjective:  Chart Notes, Work list Manager, and fingersticks reviewed. Patient reports feeling better, denies any major complaints    Review of Systems:  Constitutional: No fever  Eyes: No blurry vision  Neuro: No tremors  HEENT: No pain  Cardiovascular: No chest pain, palpitations  Respiratory: No SOB, no cough  GI: No nausea, vomiting, abdominal pain  Endocrine: no polyuria, polydipsia    Medications  (Standing):  allopurinol 100 milliGRAM(s) Oral daily  ammonium lactate 12% Lotion 1 Application(s) Topical two times a day  aspirin  chewable 81 milliGRAM(s) Oral daily  atorvastatin 40 milliGRAM(s) Oral at bedtime  heparin   Injectable 5000 Unit(s) SubCutaneous every 12 hours  hydrALAZINE 10 milliGRAM(s) Oral three times a day  insulin lispro (ADMELOG) corrective regimen sliding scale   SubCutaneous three times a day before meals  insulin lispro (ADMELOG) corrective regimen sliding scale   SubCutaneous at bedtime  isosorbide   mononitrate ER Tablet (IMDUR) 30 milliGRAM(s) Oral daily  lactated ringers. 1000 milliLiter(s) (100 mL/Hr) IV Continuous <Continuous>  lidocaine   4% Patch 1 Patch Transdermal every 24 hours  traZODone 25 milliGRAM(s) Oral at bedtime    Medications (PRN):  acetaminophen     Tablet .. 650 milliGRAM(s) Oral every 6 hours PRN Temp greater or equal to 38C (100.4F), Mild Pain (1 - 3)  diphenhydrAMINE 25 milliGRAM(s) Oral every 6 hours PRN Rash and/or Itching  meclizine 12.5 milliGRAM(s) Oral daily PRN Dizziness    Physical examination:  VITALS: T(C): 37.1 (08-31-23 @ 14:35)  T(F): 98.8 (08-31-23 @ 14:35), Max: 100 (08-30-23 @ 21:12)  HR: 85 (08-31-23 @ 14:35) (84 - 95)  BP: 109/62 (08-31-23 @ 14:35) (109/62 - 133/70)  RR:  (16 - 18)  SpO2:  (96% - 99%)    GENERAL: NAD,   HEENT: Dry mucous membranes  THYROID: Normal size, no palpable nodules  RESPIRATORY: Clear to auscultation bilaterally; No rales, rhonchi, wheezing  CARDIOVASCULAR: Regular rate and rhythm; No murmurs; no peripheral edema  GI: Soft, nontender, non distended, +ve abdominal obesity  EXTREMITIES: +ve peripheral pulses, -ve pedal edema  SKIN: Dry, intact, No rashes or lesions  PSYCH: Alert and oriented x 3    Labs:  Capillary Blood Glucose:  POCT Blood Glucose.: 164 mg/dL (31 Aug 2023 11:28)  POCT Blood Glucose.: 161 mg/dL (31 Aug 2023 07:42)  POCT Blood Glucose.: 206 mg/dL (30 Aug 2023 22:23)  POCT Blood Glucose.: 197 mg/dL (30 Aug 2023 17:01)    08-31  141  |  109<H>  |  33<H>  ----------------------------<  149<H>  4.2   |  26  |  1.80<H>  eGFR: 29<L>  Ca    8.8      08-31  Mg     2.5     08-31  Phos  3.4     08-31    A1C with Estimated Average Glucose Result: 6.8. % (08.30.23 @ 07:10)    Assessment and Plan:    77M w PMhx HTN, CAD, Depression, Type 2 DM (on glimepiride and Lantus at home), CKD, pulmonary fibrosis p/w syncopal episode at home 2 days prior, generalized body aches, and headache for the past 5 days. Pt admitted for generalized weakness and syncope workup. Pt was found to have hypoglycemia with blood sugar to 45, corrected with dextrose. Endo was consulted for hypoglycemia.     1) Hypoglycemia due to sulfonylurea  2) Hypoglycemia due to insulin  3) Type 2 diabetes:  4) Diabetic nephropathy    A1C is 6.8%    Patient stated she was injecting 90 units of Lantus at home in addition to glimepiride. Hypoglycemia was likely due to insulin overdose and sulfonylurea dose  Blood sugars are better controlled, no episodes of hypoglycemia  Patient is eating well      Inpatient Recommendations:  - Basal Insulin:   None    - Nutritional Insulin:  None    - Correctional (Sliding) Insulin:  Continue low Lispro (Admelog) sliding scale with meals and bedtime    - Oral Medications:  None in the hospital    Discharge Recommendations:    1) STOP GLIMEPIRIDE on discharge  2) STOP LANTUS  3) Prescribe Tradjenta 5 mg daily, if its not covered by her insurance then discharge patient on Pioglitazone ( Actos) 15 mg daily    Discussed endocrine plan of care with patient and primary team

## 2023-08-31 NOTE — PROGRESS NOTE ADULT - PROBLEM SELECTOR PLAN 1
Pt came to ED after syncopal episode in which she lost consciousness & woke with slurred speech  - Pt was on the floor for approximately 8 hrs before being found  - CK elevated on adm 747 trending down 635 > 632  - CTH/C-spine/C/Ab/P wnl. CXR and Pelvis Xray wnl.   - Echo showed grade I diastolic dysfunction w/ normal EF (55-60%)  - Orthostatic b/p  - EEG showed bilateral frontotemporal focal cerebral dysfunction may be nonspecific or can be structural or functional in etiology  - MRI brain showed no acute intracranial hemorrhage or evidence of acute ischemia   - Cardio Dr. Guardado consulted  - Neuro Dr. Izaguirre consulted

## 2023-08-31 NOTE — PROGRESS NOTE ADULT - ASSESSMENT
77F w pmh of HTN, CAD, Depression, Type 2 DM, CKD, and pulmonary fibrosis presented to ED on 8/23 after a syncopal episode at home. Pt was subsequently admitted to telemetry for syncope workup and management.        2 days ago, generalized body aches, and headache for the past 5 days. Pt. admits that her PO intake decreased. No recent travel or sick contacts. Vitals: BP 170s-158/80s > 130s after resuming her BP meds.  Labs significant of a POCT 45 > ,  > 635 > 632, Cr 1.78 > 1.99 (baseline 1.3-1.5). Trop neg, UA neg, RVP neg. CTH/C-spine/C/Ab/P wnl. CXR and Pelvis Xray wnl. Echo showed grade I diastolic dysfunction w/ normal EF (55-60%). MRI brain/EEG wnl.

## 2023-08-31 NOTE — DISCHARGE NOTE PROVIDER - ATTENDING DISCHARGE PHYSICAL EXAMINATION:
Patient was seen and examined at bedside. At her baseline mental and functional status. Denies any complains     ICU Vital Signs Last 24 Hrs  T(C): 36.4 (01 Sep 2023 14:08), Max: 37.2 (01 Sep 2023 01:00)  T(F): 97.5 (01 Sep 2023 14:08), Max: 99 (01 Sep 2023 01:00)  HR: 82 (01 Sep 2023 14:08) (82 - 99)  BP: 128/78 (01 Sep 2023 14:08) (122/60 - 146/79)  BP(mean): 94 (01 Sep 2023 14:08) (94 - 94)  ABP: --  ABP(mean): --  RR: 18 (01 Sep 2023 14:08) (17 - 18)  SpO2: 97% (01 Sep 2023 14:08) (97% - 99%)    O2 Parameters below as of 01 Sep 2023 14:08  Patient On (Oxygen Delivery Method): room air    p/E:  NAD  AAOx3, no focal deficit   CTABL  S1S2 WNL  Abd soft, non tender, BS present   No tenderness in hip   BLLE no edema or calf tenderness     Labs noted     A/P:  Positive blood culture is possibly contamination. Will follow up the final blood culture. Eosinophilia is possibly due to holding Ofev, patient will resume on discharge. Advised to followup with PCP and Pulmonologist.   Educated about hypoglycemia symptoms. Agree with changing regimen to Tradjenta. Home care service requested  Stable to be discharged. Discussed with patient.

## 2023-08-31 NOTE — PROGRESS NOTE ADULT - ASSESSMENT
77M PMhx HTN, CAD, Depression, Type 2 DM, CKD, pulmonary fibrosis presents s/p syncopal episode at home 2 days ago, she went to bed Saturday night, and woke up on the floor the next morning (she didn't know how she got there).    CKD 4  Prior baseline 1.3 back in 2020  current baseline around 1.7  higher today, variations expected  plan:  monitor  DASH diet  daily BMP    HTN  resume home regimen  monitor    Syncope  per primary team      For any question, call:  Cell # [phone]  Pager # [phone]  Callback # [phone]

## 2023-08-31 NOTE — PROGRESS NOTE ADULT - ATTENDING COMMENTS
Patient was seen and examined Patient was seen and examined at bedside. Symptoms has significantly improved. Mild intermittent pain in left thigh.    ICU Vital Signs Last 24 Hrs  T(C): 37.1 (31 Aug 2023 14:35), Max: 37.8 (30 Aug 2023 21:12)  T(F): 98.8 (31 Aug 2023 14:35), Max: 100 (30 Aug 2023 21:12)  HR: 85 (31 Aug 2023 14:35) (84 - 95)  BP: 109/62 (31 Aug 2023 14:35) (109/62 - 133/70)  BP(mean): --  ABP: --  ABP(mean): --  RR: 18 (31 Aug 2023 14:35) (16 - 18)  SpO2: 99% (31 Aug 2023 14:35) (96% - 99%)    O2 Parameters below as of 31 Aug 2023 14:35  Patient On (Oxygen Delivery Method): room air    P/E:  NAD  AAOx2-3, no focal deficit   CTABL  s1S2 WNL  Abd soft, non tender, BS present   BLLE no edema or calf tenderness, chronic venous stasis skin change. Left hip tenderness improved    Labs noted    A/P:  Severe hypoglycemia   Left hip degenerative joint disease   HTN  DM with hypoglycemia   DENIS on CKD   CAD s/p stent   H/o ICA/ SCA stenosis     Plan:  MRI noted, neg for CVA, chronic microvascular ischemic change  EEG noted, no epileptiform change  Cont tele, tachycardia improved   BP improved, cont Hydralazine and Imdur for now   Cont aspirin and statin   Cardiology consult appreciated  TTE noted  Positive blood culture possibly contamination. no source found. repeat blood cx  Tylenol for pain in hip   Endocrine consult appreciated  Agree with ISS, avoid sulfonylurea on discharge  Syncope possibly due to hypoglycemia   Monitor renal function, gentle hydration, improving   Carotid doppler to evaluate ICA stenosis, patient has established outpatient follow up  PT eval   Fall precaution.

## 2023-08-31 NOTE — PROGRESS NOTE ADULT - SUBJECTIVE AND OBJECTIVE BOX
PGY-1 Progress Note discussed with attending    PAGER #: [[phone]] TILL 5:00 PM  PLEASE CONTACT ON CALL TEAM:  - On Call Team (Please refer to Poli) FROM 5:00 PM - 8:30PM  - Nightfloat Team FROM 8:30 -7:30 AM      INTERVAL HPI/OVERNIGHT EVENTS: one episode of fever, resolved with cold application. Pt complains of right sided headache and hip pain, lidocaine patch helps, itching has improved      REVIEW OF SYSTEMS:  CONSTITUTIONAL: No fever, weight loss, or fatigue  RESPIRATORY: No cough, wheezing, chills or hemoptysis; No shortness of breath  CARDIOVASCULAR: No chest pain, palpitations, dizziness, or leg swelling  GASTROINTESTINAL: No abdominal pain. No nausea, vomiting, or hematemesis; No diarrhea or constipation. No melena or hematochezia.  GENITOURINARY: No dysuria or hematuria, urinary frequency  NEUROLOGICAL: + headaches, No memory loss, loss of strength, numbness, or tremors  SKIN: No itching, burning, rashes, or lesions     Vital Signs Last 24 Hrs  T(C): 37.1 (31 Aug 2023 14:35), Max: 37.8 (30 Aug 2023 21:12)  T(F): 98.8 (31 Aug 2023 14:35), Max: 100 (30 Aug 2023 21:12)  HR: 85 (31 Aug 2023 14:35) (84 - 95)  BP: 109/62 (31 Aug 2023 14:35) (109/62 - 133/70)  BP(mean): --  RR: 18 (31 Aug 2023 14:35) (16 - 18)  SpO2: 99% (31 Aug 2023 14:35) (96% - 99%)    Parameters below as of 31 Aug 2023 14:35  Patient On (Oxygen Delivery Method): room air        PHYSICAL EXAMINATION:  GENERAL: NAD, well built  HEAD:  Atraumatic, Normocephalic  EYES:  conjunctiva and sclera clear  NECK: Supple, No JVD, Normal thyroid  CHEST/LUNG: Clear to auscultation. No rales, rhonchi, wheezing, or rubs  HEART: Regular rate and rhythm; No murmurs, rubs, or gallops  ABDOMEN: Soft, Nontender, Nondistended; Bowel sounds present  NERVOUS SYSTEM:  Alert & Oriented X3,    EXTREMITIES:  2+ Peripheral Pulses, No clubbing, cyanosis, or edema  SKIN: warm dry                          9.5    6.39  )-----------( 221      ( 31 Aug 2023 07:05 )             30.2     08-31    141  |  109<H>  |  33<H>  ----------------------------<  149<H>  4.2   |  26  |  1.80<H>    Ca    8.8      31 Aug 2023 07:05  Phos  3.4     08-31  Mg     2.5     08-31    TPro  7.0  /  Alb  3.2<L>  /  TBili  0.3  /  DBili  x   /  AST  21  /  ALT  21  /  AlkPhos  48  08-31    LIVER FUNCTIONS - ( 31 Aug 2023 07:05 )  Alb: 3.2 g/dL / Pro: 7.0 g/dL / ALK PHOS: 48 U/L / ALT: 21 U/L DA / AST: 21 U/L / GGT: x           CARDIAC MARKERS ( 31 Aug 2023 07:05 )  x     / x     / 317 U/L / x     / x      CARDIAC MARKERS ( 30 Aug 2023 07:10 )  x     / x     / 632 U/L / x     / x              CAPILLARY BLOOD GLUCOSE      RADIOLOGY & ADDITIONAL TESTS:

## 2023-08-31 NOTE — PROGRESS NOTE ADULT - PROBLEM SELECTOR PLAN 10
Pt reporting lower back pain following her fall   - Pt was on floor for approx 8hrs before being found  - Pt started on lidocaine patch and Tylenol PRN for pain control   - Monitor pain to ensure well controlled

## 2023-08-31 NOTE — DISCHARGE NOTE PROVIDER - NSDCCPCAREPLAN_GEN_ALL_CORE_FT
PRINCIPAL DISCHARGE DIAGNOSIS  Diagnosis: Syncope  Assessment and Plan of Treatment: You had an episode of fainting with loss of conciousness. You had a low sugar level which is likely what precipitated the event. In the emergency department, your sugar level was 45 which is too low. Throughout your hospital stay, you had numerous imaging studies done, including CT head, CT C-spine, CT abdomen, CT pelvis, chest X-ray, pelvis X-ray, MRI of brain, EKG, and EEG, all of which were normal. You had an echocardiogram which showed some minor diastolic dysfunction with a normal ejection fraction.      SECONDARY DISCHARGE DIAGNOSES  Diagnosis: Acute kidney injury superimposed on CKD  Assessment and Plan of Treatment: In your hospital stay, you had injury to your kidneys. Your creatinine levels were elevated at 1.99 but decreased throughout your stay to 1.7.    Diagnosis: HTN (hypertension)  Assessment and Plan of Treatment:      PRINCIPAL DISCHARGE DIAGNOSIS  Diagnosis: Syncope  Assessment and Plan of Treatment: You came to hospital after a fainting episode. Workup was done to explore what might have caused this episode including labwork and full imaging with xray, CT, and MRI scans as well as an EEG which alowed us to monitor your brain function. The most significant finding from all of this was that you had a very low level of blood sugar when you came to the ED. We took you off your home diabetes medications while in the hospital and you showed no further episodes of lightheadeness or weakness. You were seen by an endocrine specialist who recommend a change in your diabetes medication afer discharge to help prevent future episodes of low blood sugar (stope taking glimepiride and lantus, and instead start taking should be prescribed Tradjenta 5mg QD or Actos 15mg QD ). Please take these new medications as prescribed and follow up with your primary care doctor within a week of your discharge to ensure this issue remins well controlled.      SECONDARY DISCHARGE DIAGNOSES  Diagnosis: DM (diabetes mellitus)  Assessment and Plan of Treatment: You have previously been diagnosed with diabetes for which you take  and  at home. When you came to the hospital, your blood sugar was dangerously low, which is a side effect of some of the medications you were taking. During your hospital stay, your diabetes was managed with sliding scale insulin. You were also seen by an endocrinologist who recommended you stop taking those medications and instead start on a new medication called Tradjenta 5mg QD or Actos 15mg QD , which has been sent to your pharmacy. Please take this new medications as prescribed and follow up with your primary care doctor within a week of your discharge to ensure this issue remains well controlled.    Diagnosis: Eosinophilia  Assessment and Plan of Treatment: Bloodwork done during your hospital stay showed that you had elevated levels of a type of blood cell called an eosinophil. You had no symptoms associated to this finding and you were monitored throughout your stay without any related complications. Please make sure to see your primary care doctor within a week of discharge to ensure this issue remains controlled.    Diagnosis: Acute kidney injury superimposed on CKD  Assessment and Plan of Treatment: Workup done when you came to the hospital showed that you had suffered an acute injury on top of your chronic kidney disease. You were treated with IV fluids which led to improvement in your kidney functions. Please make sure to see your primary care doctor within a week of discharge to ensure this issue remains well controlled.      Diagnosis: Gram positive bacterial infection  Assessment and Plan of Treatment: While in the hospital, your blood was tested to see if it would grow any bacteria. In one of the samples, bacteria grew. However, you had no signs of infection and the becteria which grew is often an outside contamination of otherwise sterile blood sample. Repeat blood cultures were taken which showed no signs of bacteria growth and so you were approved for discharge without the need for antibiotics. Please make sure to see your primary care doctor if you start to feel a fever, chills or notice any other signs of infection.    Diagnosis: Lower back pain  Assessment and Plan of Treatment: While you were in the hospital, you had back pain from where you were laying on the floor after your fall. You were given a medication patch and oral pain medications which led to improvement and control of your pain. Please make sure to see your primary care doctor within a week of discharge to ensure this issue remains well controlled.      Diagnosis: CAD (coronary artery disease)  Assessment and Plan of Treatment: You have previously been diagnosed with coronary artery disease. While in the hospital, we continued you on your home medication which adequately managed this condition. Please make sure to see your primary care doctor within a week of discharge to ensure this issue remains well controlled.    Diagnosis: HLD (hyperlipidemia)  Assessment and Plan of Treatment: You have previously been diagnosed with hyperlipidemia. While in the hospital, we continued you on your home medication which adequately managed this condition. Please make sure to see your primary care doctor within a week of discharge to ensure this issue remains well controlled.      Diagnosis: HTN (hypertension)  Assessment and Plan of Treatment: You have previously been diagnosed with high blood pressure. While in the hospital, we continued you on your home medications isorsobide mononitrate and hydralazine, but held you chlorthalidone and valsartan. This combination managed your blood pressure well. Please continue taking only these two blood pressure medications and make sure to see your primary care doctor within a week of discharge to ensure this issue remains well controlled.      Diagnosis: Itchy skin  Assessment and Plan of Treatment: While in the hospital you complained of itchy skin, a problem you said you also expierience at home. We managed your itchiness with diphenhydramine, which is the medication you take at home. This controled your symptoms so please continue taking this medication after your discharge and see your doctor soon after you leave the hospital to properly manage this problem going forward.    Diagnosis: Gout  Assessment and Plan of Treatment: You have previously been diagnosed with gout. While in the hospital, we continued you on your home medication which adequately managed this condition. Please make sure to see your primary care doctor within a week of discharge to ensure this issue remains well controlled.       PRINCIPAL DISCHARGE DIAGNOSIS  Diagnosis: Syncope  Assessment and Plan of Treatment: You came to hospital after a fainting episode. Workup was done to explore what might have caused this episode including labwork and full imaging with xray, CT, and MRI scans as well as an EEG which alowed us to monitor your brain function. The most significant finding from all of this was that you had a very low level of blood sugar when you came to the ED. We took you off your home diabetes medications while in the hospital and you showed no further episodes of lightheadeness or weakness. You were seen by an endocrine specialist who recommend a change in your diabetes medication afer discharge to help prevent future episodes of low blood sugar (stope taking glimepiride and lantus, and instead start taking Tradjenta 5mg). Please take this new medications as prescribed and follow up with your primary care doctor within a week of your discharge to ensure this issue remins well controlled.      SECONDARY DISCHARGE DIAGNOSES  Diagnosis: DM (diabetes mellitus)  Assessment and Plan of Treatment: You have previously been diagnosed with diabetes for which you take  and  at home. When you came to the hospital, your blood sugar was dangerously low, which is a side effect of some of the medications you were taking. During your hospital stay, your diabetes was managed with sliding scale insulin. You were also seen by an endocrinologist who recommended you stop taking those medications and instead start on a new medication called Tradjenta 5mg QD or Actos 15mg QD , which has been sent to your pharmacy. Please take this new medications as prescribed and follow up with your primary care doctor within a week of your discharge to ensure this issue remains well controlled.    Diagnosis: Eosinophilia  Assessment and Plan of Treatment: Bloodwork done during your hospital stay showed that you had elevated levels of a type of blood cell called an eosinophil. You had no symptoms associated to this finding and you were monitored throughout your stay without any related complications. Please make sure to see your primary care doctor within a week of discharge to ensure this issue remains controlled.    Diagnosis: HTN (hypertension)  Assessment and Plan of Treatment: You have previously been diagnosed with high blood pressure. While in the hospital, we continued you on your home medications isorsobide mononitrate and hydralazine, but discontinued your chlorthalidone and valsartan. This combination managed your blood pressure well. Please continue taking only these two blood pressure medications (isorsobine mononitrate and hydralazine) and make sure to see your primary care doctor within a week of discharge to ensure this issue remains well controlled.      Diagnosis: Acute kidney injury superimposed on CKD  Assessment and Plan of Treatment: Workup done when you came to the hospital showed that you had suffered an acute injury on top of your chronic kidney disease. You were treated with IV fluids which led to improvement in your kidney functions. Please make sure to see your primary care doctor within a week of discharge to ensure this issue remains well controlled.      Diagnosis: Gram positive bacterial infection  Assessment and Plan of Treatment: While in the hospital, your blood was tested to see if it would grow any bacteria. In one of the samples, bacteria grew. However, you had no signs of infection and the becteria which grew is often an outside contamination of otherwise sterile blood sample. Repeat blood cultures were taken which showed no signs of bacteria growth and so you were approved for discharge without the need for antibiotics. Please make sure to see your primary care doctor if you start to feel a fever, chills or notice any other signs of infection.    Diagnosis: Lower back pain  Assessment and Plan of Treatment: While you were in the hospital, you had back pain from where you were laying on the floor after your fall. You were given a medication patch and oral pain medications which led to improvement and control of your pain. Please make sure to see your primary care doctor within a week of discharge to ensure this issue remains well controlled.      Diagnosis: CAD (coronary artery disease)  Assessment and Plan of Treatment: You have previously been diagnosed with coronary artery disease. While in the hospital, we continued you on your home medication which adequately managed this condition. Please make sure to see your primary care doctor within a week of discharge to ensure this issue remains well controlled.    Diagnosis: HLD (hyperlipidemia)  Assessment and Plan of Treatment: You have previously been diagnosed with hyperlipidemia. While in the hospital, we continued you on your home medication which adequately managed this condition. Please make sure to see your primary care doctor within a week of discharge to ensure this issue remains well controlled.      Diagnosis: Itchy skin  Assessment and Plan of Treatment: While in the hospital you complained of itchy skin, a problem you said you also expierience at home. We managed your itchiness with diphenhydramine, which is the medication you take at home. This controled your symptoms so please continue taking this medication after your discharge and see your doctor soon after you leave the hospital to properly manage this problem going forward.    Diagnosis: Gout  Assessment and Plan of Treatment: You have previously been diagnosed with gout. While in the hospital, we continued you on your home medication which adequately managed this condition. Please make sure to see your primary care doctor within a week of discharge to ensure this issue remains well controlled.       PRINCIPAL DISCHARGE DIAGNOSIS  Diagnosis: Syncope  Assessment and Plan of Treatment: You came to hospital after a fainting episode. Workup was done to explore what might have caused this episode including labwork and full imaging with xray, CT, and MRI scans as well as an EEG which alowed us to monitor your brain function. The most significant finding from all of this was that you had a very low level of blood sugar when you came to the ED. We took you off your home diabetes medications while in the hospital and you showed no further episodes of lightheadeness or weakness. You were seen by an endocrine specialist who recommend a change in your diabetes medication afer discharge to help prevent future episodes of low blood sugar (stope taking glimepiride and lantus, and instead start taking Tradjenta 5mg). Please take this new medications as prescribed and follow up with your primary care doctor within a week of your discharge to ensure this issue remins well controlled.      SECONDARY DISCHARGE DIAGNOSES  Diagnosis: Acute kidney injury superimposed on CKD  Assessment and Plan of Treatment: Workup done when you came to the hospital showed that you had suffered an acute injury on top of your chronic kidney disease. You were treated with IV fluids which led to improvement in your kidney functions. Please make sure to see your primary care doctor within a week of discharge to ensure this issue remains well controlled.      Diagnosis: HTN (hypertension)  Assessment and Plan of Treatment: You have previously been diagnosed with high blood pressure. While in the hospital, we continued you on your home medications isorsobide mononitrate and hydralazine, but discontinued your chlorthalidone and valsartan. This combination managed your blood pressure well. Please continue taking only these two blood pressure medications (isorsobine mononitrate and hydralazine) and make sure to see your primary care doctor within a week of discharge to ensure this issue remains well controlled.      Diagnosis: Eosinophilia  Assessment and Plan of Treatment: Bloodwork done during your hospital stay showed that you had elevated levels of a type of blood cell called an eosinophil. You had no symptoms associated to this finding and you were monitored throughout your stay without any related complications. Please make sure to see your primary care doctor within a week of discharge to ensure this issue remains controlled.    Diagnosis: Gram positive bacterial infection  Assessment and Plan of Treatment: While in the hospital, your blood was tested to see if it would grow any bacteria. In one of the samples, bacteria grew. However, you had no signs of infection and the becteria which grew is often an outside contamination of otherwise sterile blood sample. Repeat blood cultures were taken which showed no signs of bacteria growth and so you were approved for discharge without the need for antibiotics. Please make sure to see your primary care doctor if you start to feel a fever, chills or notice any other signs of infection.    Diagnosis: CAD (coronary artery disease)  Assessment and Plan of Treatment: You have previously been diagnosed with coronary artery disease. While in the hospital, we continued you on your home medication which adequately managed this condition. Please make sure to see your primary care doctor within a week of discharge to ensure this issue remains well controlled.    Diagnosis: DM (diabetes mellitus)  Assessment and Plan of Treatment: You have previously been diagnosed with diabetes for which you take  and  at home. When you came to the hospital, your blood sugar was dangerously low, which is a side effect of some of the medications you were taking. During your hospital stay, your diabetes was managed with sliding scale insulin. You were also seen by an endocrinologist who recommended you stop taking those medications and instead start on a new medication called Tradjenta 5mg QD or Actos 15mg QD , which has been sent to your pharmacy. Please take this new medications as prescribed and follow up with your primary care doctor within a week of your discharge to ensure this issue remains well controlled.    Diagnosis: HLD (hyperlipidemia)  Assessment and Plan of Treatment: You have previously been diagnosed with hyperlipidemia. While in the hospital, we continued you on your home medication which adequately managed this condition. Please make sure to see your primary care doctor within a week of discharge to ensure this issue remains well controlled.      Diagnosis: Gout  Assessment and Plan of Treatment: You have previously been diagnosed with gout. While in the hospital, we continued you on your home medication which adequately managed this condition. Please make sure to see your primary care doctor within a week of discharge to ensure this issue remains well controlled.      Diagnosis: Itchy skin  Assessment and Plan of Treatment: While in the hospital you complained of itchy skin, a problem you said you also expierience at home. We managed your itchiness with diphenhydramine, which is the medication you take at home. This controled your symptoms so please continue taking this medication after your discharge and see your doctor soon after you leave the hospital to properly manage this problem going forward.    Diagnosis: Lower back pain  Assessment and Plan of Treatment: While you were in the hospital, you had back pain from where you were laying on the floor after your fall. You were given a medication patch and oral pain medications which led to improvement and control of your pain. Please make sure to see your primary care doctor within a week of discharge to ensure this issue remains well controlled.      Diagnosis: Pulmonary fibrosis  Assessment and Plan of Treatment: Please continue to take Ofev. Please follow up with your pulmonologist.

## 2023-08-31 NOTE — DISCHARGE NOTE PROVIDER - NSDCMRMEDTOKEN_GEN_ALL_CORE_FT
allopurinol 200 mg oral tablet: 1 tab(s) orally once a day  chlorthalidone 25 mg oral tablet: 1 tab(s) orally once a day  glimepiride 4 mg oral tablet: 1 tab(s) orally 2 times a day  hydrALAZINE 10 mg oral tablet: 1 tab(s) orally once a day  insulin glargine: 10 unit(s) subcutaneous once a day (at bedtime)  isosorbide mononitrate 30 mg oral tablet, extended release: 1 tab(s) orally once a day  Ofev 150 mg oral capsule: 1 cap(s) orally twice a day after breakfast and lunch  simvastatin 40 mg oral tablet: 1 tab(s) orally once a day  traZODone 50 mg oral tablet: 1 tab(s) orally once a day  valsartan 160 mg oral tablet: 1 tab(s) orally once a day   allopurinol 200 mg oral tablet: 1 tab(s) orally once a day  diphenhydrAMINE 25 mg oral capsule: 1 cap(s) orally every 6 hours As needed Rash and/or Itching  hydrALAZINE 10 mg oral tablet: 1 tab(s) orally once a day  isosorbide mononitrate 30 mg oral tablet, extended release: 1 tab(s) orally once a day  Ofev 150 mg oral capsule: 1 cap(s) orally twice a day after breakfast and lunch  simvastatin 40 mg oral tablet: 1 tab(s) orally once a day  Tradjenta 5 mg oral tablet: 1 tab(s) orally once a day  traZODone 50 mg oral tablet: 1 tab(s) orally once a day

## 2023-08-31 NOTE — PROGRESS NOTE ADULT - PROBLEM SELECTOR PLAN 2
Pt p/w generalized weakness in addition to her syncope  - Labs significant for a POCT blood glucose of 45 on admission, which has since improved to wnl  - Trop neg, UA neg, RVP neg  - tele monitoring  - PT recs Home PT

## 2023-08-31 NOTE — PROGRESS NOTE ADULT - PROBLEM SELECTOR PLAN 4
Pt found to have DENIS on CKD Cr 1.78 > 1.99 >1.8 (baseline 1.3-1.5)  - gentle hydration w/ LR  - f/u urine lytes, & renal US  - hold pt's ACE/thiazide  - Monitor Scr

## 2023-08-31 NOTE — PROGRESS NOTE ADULT - PROBLEM SELECTOR PLAN 7
Pt w pmh of DM on glimepiride and Lantus at home  - c/w ISS, pt has episode of hypoglycemia to 45, corrected with dextrose   - A1c: 6.8  - Endo Dr. Mota consulted, recommended to d/c glimepiride due to risk of hypoglycemia in elderly

## 2023-08-31 NOTE — PROGRESS NOTE ADULT - PROBLEM SELECTOR PLAN 11
Pt w pmh of lung disease (likely pulm fibrosis) on ofev  - not in formulary, pt not in exacerbation  - will continue to monitor

## 2023-08-31 NOTE — DISCHARGE NOTE PROVIDER - HOSPITAL COURSE
Patient is a 76 y/o female, PMHx of HTN, CAD, Depression, Type 2 DM, anemia, pulmonary fibrosis, and GERD, presented to the ED after a syncopal episode. She states that she was at home when she had a syncopal episode and awoke with no recollection of the event, saliva all over her clothes, and unable to move. She laid on the floor for 8 hours until she managed to contact her sister who sent her nephew to go and check on her. She states that she had subjective fevers, cough, body aches, and headache for the past 5 days leading up to the syncope.    In the ED, she was worked up for syncopal episode. Her vitals were BP 170s-158/80s, 99.3 F, 96 RA. Labs showed WBC 10, POCT 45, , Cr 1.78 (baseline 1.3-1.5), trop neg, UA neg, RVP neg. Her EKG showed NSR, CT H/C/Ab/P were negative, CT brain/C-spine were negative, CXR was negative, Pelvis Xray was grossly normal.     On day 2 of admission, patient reported feeling much improved, but complained of a generalized itchiness since admission, which was treated with Benadryl. Also, she complained of a RT sided headache and LT sided hip pain, for which a lidocaine patch was administered. TTE showed grade I diastolic dysfunction w/ normal EF (55-60%). Two blood cultures were sent out and one had growth of coagulase negative staphylococcus, but this result is thought to be due to contamination due to patient's lack of constitutional symptoms and normal WBC count. New blood cultures were ordered and results are pending. Patient is also presenting with DENIS superimposed on CKD consistent with elevated Cr levels (1.78 > 1.99), so gentle hydration with lactated ringers was administered. Per neurology recommendations and negative prior imaging, patient was sent for EEG and MRI brain to rule out seizure activity which were both negative.    On day 3 of admission, patient felt much improved, but complained of a new onset cough. She was still complaining of LT hip pain, so another lidocaine patch was administered. CXR was obtained which was ________. Blood cultures drawn from the day prior showed _____. Cr was still elevated at 1.8, so renal U/S was ordered due to persistent DENIS which showed ______. Patient was discharged with instructions to _______. Patient is a 78 y/o female, PMHx of HTN, CAD, Depression, Type 2 DM, anemia, pulmonary fibrosis, and GERD, presented to the ED after a syncopal episode. She states that she was at home when she had a syncopal episode and awoke with no recollection of the event, saliva all over her clothes, and unable to move. She laid on the floor for 8 hours until she managed to contact her sister who sent her nephew to go and check on her. She states that she had subjective fevers, cough, body aches, and headache for the past 5 days leading up to the syncope.    In the ED, she was worked up for syncopal episode. Her vitals were BP 170s-158/80s, 99.3 F, 96 RA. Labs showed WBC 10, POCT 45, , Cr 1.78 (baseline 1.3-1.5), trop neg, UA neg, RVP neg. Her EKG showed NSR, CT H/C/Ab/P were negative, CT brain/C-spine were negative, CXR was negative, Pelvis Xray was grossly normal.     On day 2 of admission, patient reported feeling much improved, but complained of a generalized itchiness since admission, which was treated with Benadryl. Also, she complained of a RT sided headache and LT sided hip pain, for which a lidocaine patch was administered. TTE showed grade I diastolic dysfunction w/ normal EF (55-60%). Two blood cultures were sent out and one had growth of coagulase negative staphylococcus, but this result is thought to be due to contamination due to patient's lack of constitutional symptoms and normal WBC count. New blood cultures were ordered and results are pending. Patient is also presenting with DENIS superimposed on CKD consistent with elevated Cr levels (1.78 > 1.99), so gentle hydration with lactated ringers was administered. Per neurology recommendations and negative prior imaging, patient was sent for EEG and MRI brain to rule out seizure activity which were both negative.    On day 3 of admission, patient felt much improved, but complained of a new onset cough. She was still complaining of LT hip pain, so another lidocaine patch was administered. CXR was obtained which was negative. Blood cultures drawn from the day prior showed _____. Cr was still elevated at 1.8, so renal U/S was ordered due to persistent DENIS which showed ______.     On day 4 of admission, patient complained of a throbbing RT sided headache, LT hip pain, and back pain. She was given Tylenol which she states helped her symptoms. Her Cr decreased to 1.71. Per endocrine recommendations, patient should d/c glimepiride and Lantus, and should be prescribed Tradjenta 5mg QD or Actos 15mg QD depending on insurance coverage.     Patient was discharged with instructions to _______. Patient is a 78 y/o female, PMHx of HTN, CAD, Depression, Type 2 DM, anemia, pulmonary fibrosis, and GERD, presented to the ED for a syncopal episode after which she was on the floor for 8 hours being found. In the ED, workup was significant for POCT glucose level of 45, , and a Cr 1.78 (baseline 1.3-1.5). Otherwise, trops, UA and RVP were all negative. EKG chest/pelvic x-rays, and CT H/spine/C/Ab/P were all absent for acute pathologies. On day 2 of admission, patient reported feeling much improved, but complained of a generalized itchiness since admission, which was treated with Benadryl. Also, she complained of a headache and LT sided hip pain, for which a lidocaine patch was administered. TTE showed grade I diastolic dysfunction w/ normal EF (55-60%). Two blood cultures were sent out and one had growth of coagulase negative staphylococcus, but this result is thought to be due to contamination due to patient's lack of constitutional symptoms and normal WBC count. New blood cultures were ordered and results were NGTD. Patient is also presenting with DENIS superimposed on CKD consistent with elevated Cr levels (1.78 > 1.99), so gentle hydration with lactated ringers was administered. Per neurology recommendations and negative prior imaging, patient was sent for EEG and MRI brain to rule out seizure activity which were both negative.    On day 3 of admission, patient felt much improved, but complained of a new onset cough. She was still complaining of LT hip pain, so another lidocaine patch was administered. CXR was obtained which was negative. Blood cultures drawn from the day prior showed _____. Cr was still elevated at 1.8, so renal U/S was ordered due to persistent DENIS which showed ______.     On day 4 of admission, patient complained of a throbbing RT sided headache, LT hip pain, and back pain. She was given Tylenol which she states helped her symptoms. Her Cr decreased to 1.71. Per endocrine recommendations, patient should d/c glimepiride and Lantus, and should be prescribed Tradjenta 5mg QD or Actos 15mg QD depending on insurance coverage.     Patient was discharged with instructions to _______. Patient is a 78 y/o female, PMHx of HTN, CAD, Depression, Type 2 DM, anemia, pulmonary fibrosis, and GERD, presented to the ED for a syncopal episode after which she was on the floor for 8 hours being found. In the ED, workup was significant for POCT glucose level of 45, , and a Cr 1.78 (baseline 1.3-1.5). Otherwise, trops, UA and RVP were all negative. EKG chest/pelvic x-rays, and CT H/spine/C/Ab/P were all absent for acute pathologies. On day 2 of admission, patient reported feeling much improved, but complained of a generalized itchiness since admission, which was treated with Benadryl. Also, she complained of a headache and lower back pain, for which a lidocaine patch was administered. TTE showed grade I diastolic dysfunction w/ normal EF (55-60%). Two blood cultures were sent out and one had growth of coagulase negative staphylococcus, but this result is thought to be due to contamination due to patient's lack of constitutional symptoms and normal WBC count. New blood cultures were ordered and results were NGTD. The pt was also treated for her DENIS superimposed on CKD with IV hydration which led to improvement in her renal function. The pt was seen by neurology who recommended EEG and MRI brain to rule out seizure activity which were both negative. As all work neuro workup was negative for a cause of her syncope, hypoglycemia was identified as a likely cause given the pt's low glucose level on arrival. Endo was consulted and they recommended the patient should taken off her home glimepiride and Lantus, and should be prescribed Tradjenta 5mg QD or Actos 15mg QD depending on insurance coverage. Patient was approved for discharge on 9/1 with instructions to make the recommended change to her diabetes medications and to follow up within a week with her PCP. Patient is a 78 y/o female, PMHx of HTN, CAD, Depression, Type 2 DM, anemia, pulmonary fibrosis, and GERD, presented to the ED for a syncopal episode after which she was on the floor for 8 hours being found. In the ED, workup was significant for POCT glucose level of 45, , and a Cr 1.78 (baseline 1.3-1.5). Otherwise, trops, UA and RVP were all negative. EKG chest/pelvic x-rays, and CT H/spine/C/Ab/P were all absent for acute pathologies. On day 2 of admission, patient reported feeling much improved, but complained of a generalized itchiness since admission, which was treated with Benadryl. Also, she complained of a headache and lower back pain, for which a lidocaine patch was administered. TTE showed grade I diastolic dysfunction w/ normal EF (55-60%). Two blood cultures were sent out and one had growth of coagulase negative staphylococcus, but this result is thought to be due to contamination due to patient's lack of constitutional symptoms and normal WBC count. New blood cultures were ordered and results were NGTD. The pt was also treated for her DENIS superimposed on CKD with IV hydration which led to improvement in her renal function. The pt was seen by neurology who recommended EEG and MRI brain to rule out seizure activity which were both negative. As all work neuro workup was negative for a cause of her syncope, hypoglycemia was identified as a likely cause given the pt's low glucose level on arrival. Endo was consulted and they recommended the patient should taken off her home glimepiride and Lantus, and should be prescribed Tradjenta 5mg QD. This change was made and the patient was approved for discharge on 9/1 with instructions to only take her new diabetes medication and to follow up within a week with her PCP.

## 2023-08-31 NOTE — PROGRESS NOTE ADULT - TIME BILLING
35 minutes spent the time noted on the day of this patient encounter preparing for, reviewing records/charts/labs, interview and physical examination, coordination of care with patient and primary team, providing and documenting the above E/M service and 50% of time spent face to face counseling and education provided to patient on disease course, and treatment/management. All questions and concerns were answered and addressed in detail.

## 2023-08-31 NOTE — PROGRESS NOTE ADULT - SUBJECTIVE AND OBJECTIVE BOX
***TEMPLATE ONLY***    Neurology Follow up note    Name  DANETTE OLIVAREZ    HPI:  77M PMhx HTN, CAD, Depression, Type 2 DM, CKD, pulmonary fibrosis presents s/p syncopal episode at home 2 days ago, she went to bed Saturday night, and woke up on the floor the next morning (she didn't know how she got there). It took her some time to regain the strength to get up. Today, he weakness persisted, so she called her sister. States that she has subjective fevers, cough, generalized body aches, and headache for the past 5 days. She got her shingles and pneumonia vaccines 5 days ago, and had b/l arm soreness after. Pt. admits that her PO intake decreased. No recent travel or sick contacts. Denies chest pain, sob, chills, palpitations, headache, n/v/d, abdominal pain.     Vitals: BP 170s-158/80s, 99.3 F, 96 RA  EKG: NSR   Labs: WC 10, POCT 45 > , , Cr 1.78 (baseline 1.3-1.5), trop neg, UA neg, RVP neg   CT H/C/Ab/P- No acute traumatic injury in the chest, abdomen or pelvis. CT Brain/C-spine- No acute intracranial hemorrhage, brain edema, or mass effect. No displaced calvarial fracture. No acute fracture or traumatic subluxation. No prevertebral soft tissue swelling. Degenerative changes.  CXR- no focal consolidations, effusions   Pelvis Xray grossly normal (28 Aug 2023 21:36)      Interval History -        Subjective:    Review of Systems:  Constitutional:        Eyes, Ears, Mouth, Throat:   Respiratory:                            Cardiovascular:   Gastrointestinal:                                     Genitourinary:   Musculoskeletal:                                    Dermatologic:   Neurological: as per above                                                                 Psychiatric:   Endocrine:              Hematologic/Lymphatic:     MEDICATIONS  (STANDING):  allopurinol 100 milliGRAM(s) Oral daily  ammonium lactate 12% Lotion 1 Application(s) Topical two times a day  aspirin  chewable 81 milliGRAM(s) Oral daily  atorvastatin 40 milliGRAM(s) Oral at bedtime  heparin   Injectable 5000 Unit(s) SubCutaneous every 12 hours  hydrALAZINE 10 milliGRAM(s) Oral three times a day  insulin lispro (ADMELOG) corrective regimen sliding scale   SubCutaneous three times a day before meals  insulin lispro (ADMELOG) corrective regimen sliding scale   SubCutaneous at bedtime  isosorbide   mononitrate ER Tablet (IMDUR) 30 milliGRAM(s) Oral daily  lactated ringers. 1000 milliLiter(s) (100 mL/Hr) IV Continuous <Continuous>  lidocaine   4% Patch 1 Patch Transdermal every 24 hours  traZODone 25 milliGRAM(s) Oral at bedtime    MEDICATIONS  (PRN):  acetaminophen     Tablet .. 650 milliGRAM(s) Oral every 6 hours PRN Temp greater or equal to 38C (100.4F), Mild Pain (1 - 3)  diphenhydrAMINE 25 milliGRAM(s) Oral every 6 hours PRN Rash and/or Itching  meclizine 12.5 milliGRAM(s) Oral daily PRN Dizziness      Allergies    penicillin (Swelling)    Intolerances        Objective:   Vital Signs Last 24 Hrs  T(C): 36.8 (31 Aug 2023 10:38), Max: 37.8 (30 Aug 2023 21:12)  T(F): 98.2 (31 Aug 2023 10:38), Max: 100 (30 Aug 2023 21:12)  HR: 84 (31 Aug 2023 10:38) (84 - 95)  BP: 133/70 (31 Aug 2023 10:38) (121/69 - 133/70)  BP(mean): --  RR: 18 (31 Aug 2023 10:38) (16 - 18)  SpO2: 98% (31 Aug 2023 10:38) (96% - 98%)    Parameters below as of 31 Aug 2023 10:38  Patient On (Oxygen Delivery Method): room air        General Exam:   General appearance: No acute distress                 Cardiovascular: Pedal dorsalis pulses intact bilaterally    Neurological Exam:  Mental Status: Orientated to self, date and place.  Attention intact.  No dysarthria, aphasia or neglect.  Knowledge intact.  Registration intact.  Short and long term memory grossly intact.      Cranial Nerves: CN I - not tested.  PERRL, EOMI, VFF, no nystagmus or diplopia.  No APD.  Fundi not visualized bilaterally.  CN V1-3 intact to light touch and pinprick.  No facial asymmetry.  Hearing intact to finger rub bilaterally.  Tongue, uvula and palate midline.  Sternocleidomastoid and Trapezius intact bilaterally.    Motor:   Tone: normal.                  Strength: intact throughout  Pronator drift: none                 Dysmeria: None to finger-nose-finger or heel-shin-heel  No truncal ataxia.    Tremor: No resting, postural or action tremor.  No myoclonus.    Sensation: intact to light touch, pinprick, vibration and proprioception    Deep Tendon Reflexes: 1+ bilateral biceps, triceps, brachioradialis, knee and ankle  Toes flexor bilaterally    Gait: normal and stable.      Other:    08-31    141  |  109<H>  |  33<H>  ----------------------------<  149<H>  4.2   |  26  |  1.80<H>    Ca    8.8      31 Aug 2023 07:05  Phos  3.4     08-31  Mg     2.5     08-31    TPro  7.0  /  Alb  3.2<L>  /  TBili  0.3  /  DBili  x   /  AST  21  /  ALT  21  /  AlkPhos  48  08-31 08-31    141  |  109<H>  |  33<H>  ----------------------------<  149<H>  4.2   |  26  |  1.80<H>    Ca    8.8      31 Aug 2023 07:05  Phos  3.4     08-31  Mg     2.5     08-31    TPro  7.0  /  Alb  3.2<L>  /  TBili  0.3  /  DBili  x   /  AST  21  /  ALT  21  /  AlkPhos  48  08-31    LIVER FUNCTIONS - ( 31 Aug 2023 07:05 )  Alb: 3.2 g/dL / Pro: 7.0 g/dL / ALK PHOS: 48 U/L / ALT: 21 U/L DA / AST: 21 U/L / GGT: x             Radiology    1EEG Classification / Summary:  Abnormal EEG in the awake, drowsy states.   -Frequent bilateral independent frontotemporal focal slowing.  -No epileptiform abnormalities are captured.     Clinical Impression:  -Bilateral frontotemporal focal cerebral dysfunction may be nonspecific or can be structural or functional in etiology.          < from: MR Head No Cont (08.30.23 @ 12:06) >  IMPRESSION: No acute intracranial hemorrhage or evidence of acute   ischemia.    Similar-appearing moderate severity chronic white matter microvascular   type changes.    < end of copied text >         Neurology Follow up note    Name  DANETTE OLIVAREZ    Subjective:  patient has no new c/o    Review of Systems:  Constitutional: no fever       Respiratory:                no cough              MEDICATIONS  (STANDING):  allopurinol 100 milliGRAM(s) Oral daily  ammonium lactate 12% Lotion 1 Application(s) Topical two times a day  aspirin  chewable 81 milliGRAM(s) Oral daily  atorvastatin 40 milliGRAM(s) Oral at bedtime  heparin   Injectable 5000 Unit(s) SubCutaneous every 12 hours  hydrALAZINE 10 milliGRAM(s) Oral three times a day  insulin lispro (ADMELOG) corrective regimen sliding scale   SubCutaneous three times a day before meals  insulin lispro (ADMELOG) corrective regimen sliding scale   SubCutaneous at bedtime  isosorbide   mononitrate ER Tablet (IMDUR) 30 milliGRAM(s) Oral daily  lactated ringers. 1000 milliLiter(s) (100 mL/Hr) IV Continuous <Continuous>  lidocaine   4% Patch 1 Patch Transdermal every 24 hours  traZODone 25 milliGRAM(s) Oral at bedtime    MEDICATIONS  (PRN):  acetaminophen     Tablet .. 650 milliGRAM(s) Oral every 6 hours PRN Temp greater or equal to 38C (100.4F), Mild Pain (1 - 3)  diphenhydrAMINE 25 milliGRAM(s) Oral every 6 hours PRN Rash and/or Itching  meclizine 12.5 milliGRAM(s) Oral daily PRN Dizziness      Allergies    penicillin (Swelling)    Intolerances        Objective:   Vital Signs Last 24 Hrs  T(C): 36.8 (31 Aug 2023 10:38), Max: 37.8 (30 Aug 2023 21:12)  T(F): 98.2 (31 Aug 2023 10:38), Max: 100 (30 Aug 2023 21:12)  HR: 84 (31 Aug 2023 10:38) (84 - 95)  BP: 133/70 (31 Aug 2023 10:38) (121/69 - 133/70)  BP(mean): --  RR: 18 (31 Aug 2023 10:38) (16 - 18)  SpO2: 98% (31 Aug 2023 10:38) (96% - 98%)    Parameters below as of 31 Aug 2023 10:38  Patient On (Oxygen Delivery Method): room air        General Exam:   General appearance: No acute distress                 Cardiovascular: Pedal dorsalis pulses intact bilaterally    Neurological Exam:  Mental Status: Orientated to self, date and place.  Attention intact.  No dysarthria, aphasia or neglect.      Cranial Nerves: CN I - not tested.  PERRL, EOMI, VFF, no nystagmus or diplopia.  No APD.  Fundi not visualized bilaterally.  CN V1-3 intact to light touch.  No facial asymmetry.     Other:    08-31    141  |  109<H>  |  33<H>  ----------------------------<  149<H>  4.2   |  26  |  1.80<H>    Ca    8.8      31 Aug 2023 07:05  Phos  3.4     08-31  Mg     2.5     08-31    TPro  7.0  /  Alb  3.2<L>  /  TBili  0.3  /  DBili  x   /  AST  21  /  ALT  21  /  AlkPhos  48  08-31 08-31    141  |  109<H>  |  33<H>  ----------------------------<  149<H>  4.2   |  26  |  1.80<H>    Ca    8.8      31 Aug 2023 07:05  Phos  3.4     08-31  Mg     2.5     08-31    TPro  7.0  /  Alb  3.2<L>  /  TBili  0.3  /  DBili  x   /  AST  21  /  ALT  21  /  AlkPhos  48  08-31    LIVER FUNCTIONS - ( 31 Aug 2023 07:05 )  Alb: 3.2 g/dL / Pro: 7.0 g/dL / ALK PHOS: 48 U/L / ALT: 21 U/L DA / AST: 21 U/L / GGT: x             Radiology    1EEG Classification / Summary:  Abnormal EEG in the awake, drowsy states.   -Frequent bilateral independent frontotemporal focal slowing.  -No epileptiform abnormalities are captured.     Clinical Impression:  -Bilateral frontotemporal focal cerebral dysfunction may be nonspecific or can be structural or functional in etiology.          < from: MR Head No Cont (08.30.23 @ 12:06) >  IMPRESSION: No acute intracranial hemorrhage or evidence of acute   ischemia.    Similar-appearing moderate severity chronic white matter microvascular   type changes.    < end of copied text >

## 2023-08-31 NOTE — PROGRESS NOTE ADULT - SUBJECTIVE AND OBJECTIVE BOX
[facility] - JUAN Woodruff / JUAN Bang / DONNY Asencio/ JUAN Segura/ JUAN Thornton/ ESSENCE Miguel / JANIE Palacio / CHUCKY Aquino  ---------------------------------------------------------------------------------------------------------------  seen and examined today for ckd  Interval : NAD  VITALS:  T(F): 99.5 (08-31-23 @ 05:16), Max: 100 (08-30-23 @ 21:12)  HR: 85 (08-31-23 @ 05:16)  BP: 121/69 (08-31-23 @ 05:16)  RR: 17 (08-31-23 @ 05:16)  SpO2: 97% (08-31-23 @ 05:16)  Wt(kg): --    Physical Exam :-  Constitutional: NAD  Neck: Supple.  Respiratory: Bilateral equal breath sounds,  Cardiovascular: S1, S2 normal,  Gastrointestinal: Bowel Sounds present, soft, non tender.  Extremities: No edema  Neurological: Alert and Oriented x 3, no focal deficits  Psychiatric: Normal mood, normal affect  Data:-  Allergies :   penicillin (Swelling)    Hospital Medications:   MEDICATIONS  (STANDING):  allopurinol 200 milliGRAM(s) Oral daily  ammonium lactate 12% Lotion 1 Application(s) Topical two times a day  aspirin  chewable 81 milliGRAM(s) Oral daily  atorvastatin 40 milliGRAM(s) Oral at bedtime  heparin   Injectable 5000 Unit(s) SubCutaneous every 12 hours  hydrALAZINE 10 milliGRAM(s) Oral three times a day  insulin lispro (ADMELOG) corrective regimen sliding scale   SubCutaneous three times a day before meals  insulin lispro (ADMELOG) corrective regimen sliding scale   SubCutaneous at bedtime  isosorbide   mononitrate ER Tablet (IMDUR) 30 milliGRAM(s) Oral daily  lactated ringers. 1000 milliLiter(s) (100 mL/Hr) IV Continuous <Continuous>  lidocaine   4% Patch 1 Patch Transdermal every 24 hours  traZODone 25 milliGRAM(s) Oral at bedtime    08-31    141  |  109<H>  |  33<H>  ----------------------------<  149<H>  4.2   |  26  |  1.80<H>    Ca    8.8      31 Aug 2023 07:05  Phos  3.4     08-31  Mg     2.5     08-31    TPro  7.0  /  Alb  3.2<L>  /  TBili  0.3  /  DBili      /  AST  21  /  ALT  21  /  AlkPhos  48  08-31    Creatinine Trend: 1.80 <--, 1.99 <--, 1.78 <--, 1.78 <--                        9.5    6.39  )-----------( 221      ( 31 Aug 2023 07:05 )             30.2

## 2023-08-31 NOTE — PROGRESS NOTE ADULT - PROBLEM SELECTOR PLAN 6
Pt w pmh of HTN on chlorthalidone, valsartan, ISMN, and hydralazine   - continued on Isorsobide and hydralazine, hold ACE/thiazide in setting of DENIS   - Monitor vitals

## 2023-08-31 NOTE — PROGRESS NOTE ADULT - ASSESSMENT
Likely seizure in patient with DM who does not check her FS at home on insulin and noted to be hypoglycemia which can cause seizure.   MRI brain and EEG unremarakble for seziure foci.  Patient should fu with her endocrinologist for DM management given hypoglycemia.     pls call back with questions   Likely seizure in patient with DM who does not check her FS at home on insulin and noted to be hypoglycemia which can cause seizure.   MRI brain and EEG unremarkable for seizure foci.  Patient should fu with her endocrinologist for DM management given hypoglycemia.     pls call back with questions

## 2023-09-01 ENCOUNTER — TRANSCRIPTION ENCOUNTER (OUTPATIENT)
Age: 78
End: 2023-09-01

## 2023-09-01 VITALS
OXYGEN SATURATION: 97 % | RESPIRATION RATE: 18 BRPM | TEMPERATURE: 98 F | SYSTOLIC BLOOD PRESSURE: 128 MMHG | HEART RATE: 82 BPM | DIASTOLIC BLOOD PRESSURE: 78 MMHG

## 2023-09-01 LAB
ALBUMIN SERPL ELPH-MCNC: 3.4 G/DL — LOW (ref 3.5–5)
ALP SERPL-CCNC: 52 U/L — SIGNIFICANT CHANGE UP (ref 40–120)
ALT FLD-CCNC: 21 U/L DA — SIGNIFICANT CHANGE UP (ref 10–60)
ANION GAP SERPL CALC-SCNC: 5 MMOL/L — SIGNIFICANT CHANGE UP (ref 5–17)
AST SERPL-CCNC: 21 U/L — SIGNIFICANT CHANGE UP (ref 10–40)
BASOPHILS # BLD AUTO: 0.06 K/UL — SIGNIFICANT CHANGE UP (ref 0–0.2)
BASOPHILS NFR BLD AUTO: 1.1 % — SIGNIFICANT CHANGE UP (ref 0–2)
BILIRUB SERPL-MCNC: 0.4 MG/DL — SIGNIFICANT CHANGE UP (ref 0.2–1.2)
BUN SERPL-MCNC: 31 MG/DL — HIGH (ref 7–18)
CALCIUM SERPL-MCNC: 8.9 MG/DL — SIGNIFICANT CHANGE UP (ref 8.4–10.5)
CHLORIDE SERPL-SCNC: 107 MMOL/L — SIGNIFICANT CHANGE UP (ref 96–108)
CO2 SERPL-SCNC: 28 MMOL/L — SIGNIFICANT CHANGE UP (ref 22–31)
CREAT SERPL-MCNC: 1.71 MG/DL — HIGH (ref 0.5–1.3)
EGFR: 31 ML/MIN/1.73M2 — LOW
EOSINOPHIL # BLD AUTO: 0.39 K/UL — SIGNIFICANT CHANGE UP (ref 0–0.5)
EOSINOPHIL NFR BLD AUTO: 7 % — HIGH (ref 0–6)
GLUCOSE BLDC GLUCOMTR-MCNC: 156 MG/DL — HIGH (ref 70–99)
GLUCOSE BLDC GLUCOMTR-MCNC: 188 MG/DL — HIGH (ref 70–99)
GLUCOSE SERPL-MCNC: 231 MG/DL — HIGH (ref 70–99)
HCT VFR BLD CALC: 32.9 % — LOW (ref 34.5–45)
HGB BLD-MCNC: 10.3 G/DL — LOW (ref 11.5–15.5)
IMM GRANULOCYTES NFR BLD AUTO: 0.2 % — SIGNIFICANT CHANGE UP (ref 0–0.9)
LYMPHOCYTES # BLD AUTO: 1.46 K/UL — SIGNIFICANT CHANGE UP (ref 1–3.3)
LYMPHOCYTES # BLD AUTO: 26.4 % — SIGNIFICANT CHANGE UP (ref 13–44)
MAGNESIUM SERPL-MCNC: 2.5 MG/DL — SIGNIFICANT CHANGE UP (ref 1.6–2.6)
MCHC RBC-ENTMCNC: 31 PG — SIGNIFICANT CHANGE UP (ref 27–34)
MCHC RBC-ENTMCNC: 31.3 GM/DL — LOW (ref 32–36)
MCV RBC AUTO: 99.1 FL — SIGNIFICANT CHANGE UP (ref 80–100)
MONOCYTES # BLD AUTO: 0.65 K/UL — SIGNIFICANT CHANGE UP (ref 0–0.9)
MONOCYTES NFR BLD AUTO: 11.7 % — SIGNIFICANT CHANGE UP (ref 2–14)
NEUTROPHILS # BLD AUTO: 2.97 K/UL — SIGNIFICANT CHANGE UP (ref 1.8–7.4)
NEUTROPHILS NFR BLD AUTO: 53.6 % — SIGNIFICANT CHANGE UP (ref 43–77)
NRBC # BLD: 0 /100 WBCS — SIGNIFICANT CHANGE UP (ref 0–0)
PHOSPHATE SERPL-MCNC: 3.2 MG/DL — SIGNIFICANT CHANGE UP (ref 2.5–4.5)
PLATELET # BLD AUTO: 219 K/UL — SIGNIFICANT CHANGE UP (ref 150–400)
POTASSIUM SERPL-MCNC: 4.5 MMOL/L — SIGNIFICANT CHANGE UP (ref 3.5–5.3)
POTASSIUM SERPL-SCNC: 4.5 MMOL/L — SIGNIFICANT CHANGE UP (ref 3.5–5.3)
PROT SERPL-MCNC: 7.7 G/DL — SIGNIFICANT CHANGE UP (ref 6–8.3)
RBC # BLD: 3.32 M/UL — LOW (ref 3.8–5.2)
RBC # FLD: 15 % — HIGH (ref 10.3–14.5)
SODIUM SERPL-SCNC: 140 MMOL/L — SIGNIFICANT CHANGE UP (ref 135–145)
WBC # BLD: 5.54 K/UL — SIGNIFICANT CHANGE UP (ref 3.8–10.5)
WBC # FLD AUTO: 5.54 K/UL — SIGNIFICANT CHANGE UP (ref 3.8–10.5)

## 2023-09-01 PROCEDURE — 71250 CT THORAX DX C-: CPT | Mod: MA

## 2023-09-01 PROCEDURE — 84100 ASSAY OF PHOSPHORUS: CPT

## 2023-09-01 PROCEDURE — 84300 ASSAY OF URINE SODIUM: CPT

## 2023-09-01 PROCEDURE — 84443 ASSAY THYROID STIM HORMONE: CPT

## 2023-09-01 PROCEDURE — 83935 ASSAY OF URINE OSMOLALITY: CPT

## 2023-09-01 PROCEDURE — 86803 HEPATITIS C AB TEST: CPT

## 2023-09-01 PROCEDURE — 80048 BASIC METABOLIC PNL TOTAL CA: CPT

## 2023-09-01 PROCEDURE — 85025 COMPLETE CBC W/AUTO DIFF WBC: CPT

## 2023-09-01 PROCEDURE — 72170 X-RAY EXAM OF PELVIS: CPT

## 2023-09-01 PROCEDURE — 93306 TTE W/DOPPLER COMPLETE: CPT

## 2023-09-01 PROCEDURE — 87077 CULTURE AEROBIC IDENTIFY: CPT

## 2023-09-01 PROCEDURE — 70551 MRI BRAIN STEM W/O DYE: CPT

## 2023-09-01 PROCEDURE — 71045 X-RAY EXAM CHEST 1 VIEW: CPT

## 2023-09-01 PROCEDURE — 97530 THERAPEUTIC ACTIVITIES: CPT

## 2023-09-01 PROCEDURE — 72125 CT NECK SPINE W/O DYE: CPT | Mod: MA

## 2023-09-01 PROCEDURE — 84439 ASSAY OF FREE THYROXINE: CPT

## 2023-09-01 PROCEDURE — 86140 C-REACTIVE PROTEIN: CPT

## 2023-09-01 PROCEDURE — 87150 DNA/RNA AMPLIFIED PROBE: CPT

## 2023-09-01 PROCEDURE — 36415 COLL VENOUS BLD VENIPUNCTURE: CPT

## 2023-09-01 PROCEDURE — 83036 HEMOGLOBIN GLYCOSYLATED A1C: CPT

## 2023-09-01 PROCEDURE — 95816 EEG AWAKE AND DROWSY: CPT

## 2023-09-01 PROCEDURE — 95957 EEG DIGITAL ANALYSIS: CPT

## 2023-09-01 PROCEDURE — 99239 HOSP IP/OBS DSCHRG MGMT >30: CPT

## 2023-09-01 PROCEDURE — 96365 THER/PROPH/DIAG IV INF INIT: CPT

## 2023-09-01 PROCEDURE — 97116 GAIT TRAINING THERAPY: CPT

## 2023-09-01 PROCEDURE — 82962 GLUCOSE BLOOD TEST: CPT

## 2023-09-01 PROCEDURE — 87040 BLOOD CULTURE FOR BACTERIA: CPT

## 2023-09-01 PROCEDURE — 87086 URINE CULTURE/COLONY COUNT: CPT

## 2023-09-01 PROCEDURE — 97110 THERAPEUTIC EXERCISES: CPT

## 2023-09-01 PROCEDURE — 83605 ASSAY OF LACTIC ACID: CPT

## 2023-09-01 PROCEDURE — 85730 THROMBOPLASTIN TIME PARTIAL: CPT

## 2023-09-01 PROCEDURE — 81001 URINALYSIS AUTO W/SCOPE: CPT

## 2023-09-01 PROCEDURE — 81003 URINALYSIS AUTO W/O SCOPE: CPT

## 2023-09-01 PROCEDURE — 96375 TX/PRO/DX INJ NEW DRUG ADDON: CPT

## 2023-09-01 PROCEDURE — 82570 ASSAY OF URINE CREATININE: CPT

## 2023-09-01 PROCEDURE — 0225U NFCT DS DNA&RNA 21 SARSCOV2: CPT

## 2023-09-01 PROCEDURE — 97162 PT EVAL MOD COMPLEX 30 MIN: CPT

## 2023-09-01 PROCEDURE — 85652 RBC SED RATE AUTOMATED: CPT

## 2023-09-01 PROCEDURE — 83735 ASSAY OF MAGNESIUM: CPT

## 2023-09-01 PROCEDURE — 85027 COMPLETE CBC AUTOMATED: CPT

## 2023-09-01 PROCEDURE — 80053 COMPREHEN METABOLIC PANEL: CPT

## 2023-09-01 PROCEDURE — 74176 CT ABD & PELVIS W/O CONTRAST: CPT | Mod: MA

## 2023-09-01 PROCEDURE — 70450 CT HEAD/BRAIN W/O DYE: CPT | Mod: MA

## 2023-09-01 PROCEDURE — 99285 EMERGENCY DEPT VISIT HI MDM: CPT | Mod: 25

## 2023-09-01 PROCEDURE — 82550 ASSAY OF CK (CPK): CPT

## 2023-09-01 PROCEDURE — 84484 ASSAY OF TROPONIN QUANT: CPT

## 2023-09-01 PROCEDURE — 85610 PROTHROMBIN TIME: CPT

## 2023-09-01 PROCEDURE — 93005 ELECTROCARDIOGRAM TRACING: CPT

## 2023-09-01 RX ORDER — CHLORTHALIDONE 50 MG
1 TABLET ORAL
Refills: 0 | DISCHARGE

## 2023-09-01 RX ORDER — VALSARTAN 80 MG/1
1 TABLET ORAL
Refills: 0 | DISCHARGE

## 2023-09-01 RX ORDER — DIPHENHYDRAMINE HCL 50 MG
1 CAPSULE ORAL
Qty: 0 | Refills: 0 | DISCHARGE
Start: 2023-09-01

## 2023-09-01 RX ORDER — LINAGLIPTIN 5 MG/1
1 TABLET, FILM COATED ORAL
Qty: 30 | Refills: 0
Start: 2023-09-01 | End: 2023-09-30

## 2023-09-01 RX ORDER — GLIMEPIRIDE 1 MG
1 TABLET ORAL
Refills: 0 | DISCHARGE

## 2023-09-01 RX ADMIN — LIDOCAINE 1 PATCH: 4 CREAM TOPICAL at 12:22

## 2023-09-01 RX ADMIN — LIDOCAINE 1 PATCH: 4 CREAM TOPICAL at 00:32

## 2023-09-01 RX ADMIN — Medication 100 MILLIGRAM(S): at 12:22

## 2023-09-01 RX ADMIN — HEPARIN SODIUM 5000 UNIT(S): 5000 INJECTION INTRAVENOUS; SUBCUTANEOUS at 05:42

## 2023-09-01 RX ADMIN — Medication 2: at 12:21

## 2023-09-01 RX ADMIN — Medication 2: at 08:31

## 2023-09-01 RX ADMIN — ISOSORBIDE MONONITRATE 30 MILLIGRAM(S): 60 TABLET, EXTENDED RELEASE ORAL at 12:22

## 2023-09-01 RX ADMIN — Medication 1 APPLICATION(S): at 05:42

## 2023-09-01 RX ADMIN — Medication 650 MILLIGRAM(S): at 15:33

## 2023-09-01 RX ADMIN — Medication 10 MILLIGRAM(S): at 05:42

## 2023-09-01 RX ADMIN — Medication 81 MILLIGRAM(S): at 12:22

## 2023-09-01 NOTE — PROGRESS NOTE ADULT - ASSESSMENT
77M PMhx HTN, CAD, Depression, Type 2 DM, CKD, pulmonary fibrosis presents s/p syncopal episode at home 2 days ago, she went to bed Saturday night, and woke up on the floor the next morning (she didn't know how she got there).    CKD 4  Prior baseline 1.3 back in 2020  current baseline around 1.7  higher today, variations expected  plan:  monitor  DASH diet  daily BMP    HTN  resume home regimen  monitor    Syncope  per primary team      For any question, call:  Cell # [phone]  Pager # [phone]  Callback # [phone]

## 2023-09-01 NOTE — DISCHARGE NOTE NURSING/CASE MANAGEMENT/SOCIAL WORK - PATIENT PORTAL LINK FT
You can access the FollowMyHealth Patient Portal offered by [facility] by registering at the following website: [email or web address]. By joining [employer]’s FollowMyHealth portal, you will also be able to view your health information using other applications (apps) compatible with our system.

## 2023-09-01 NOTE — PROGRESS NOTE ADULT - PROBLEM SELECTOR PLAN 3
BCx from 8/28 growing gram + cocci in cluster  - final result: coagulase negative staph  - BCx reordered due to possible contamination/lack of constitutional sxs  - No wbc, no identifiable source for now   - Hold abx until repeat BCx results are available   - BCx sent on 8/31  - RVP, UA negative, CXR normal  - ESR elevated at 67  - CRP elevated at 18  - ID following, Dr. Rodriguez

## 2023-09-01 NOTE — PROGRESS NOTE ADULT - PROVIDER SPECIALTY LIST ADULT
Endocrinology
Cardiology
Endocrinology
Nephrology
Nephrology
Neurology
Internal Medicine
Nephrology
Internal Medicine

## 2023-09-01 NOTE — DISCHARGE NOTE NURSING/CASE MANAGEMENT/SOCIAL WORK - NSDCPEFALRISK_GEN_ALL_CORE
For information on Fall & Injury Prevention, visit: [email or web address] OR  [email or web address] OR  https://www.cdc.gov/steadi/patient.html

## 2023-09-01 NOTE — PROGRESS NOTE ADULT - SUBJECTIVE AND OBJECTIVE BOX
[facility] - JUAN Woodruff / JUAN Bang / DONNY Asencio/ JUAN Segura/ JUAN Thornton/ ESSENCE Miguel / JANIE Palacio / CHUCKY Aquino  ---------------------------------------------------------------------------------------------------------------  seen and examined today for CKD  Interval : NAD  VITALS:  T(F): 98.4 (09-01-23 @ 05:12), Max: 99 (09-01-23 @ 01:00)  HR: 83 (09-01-23 @ 05:12)  BP: 127/71 (09-01-23 @ 05:12)  RR: 17 (09-01-23 @ 05:12)  SpO2: 98% (09-01-23 @ 05:12)  Wt(kg): --    Physical Exam :-  Constitutional: NAD  Neck: Supple.  Respiratory: Bilateral equal breath sounds,  Cardiovascular: S1, S2 normal,  Gastrointestinal: Bowel Sounds present, soft, non tender.  Extremities: No edema  Neurological: Alert and Oriented x 3, no focal deficits  Psychiatric: Normal mood, normal affect  Data:-  Allergies :   penicillin (Swelling)    Hospital Medications:   MEDICATIONS  (STANDING):  allopurinol 100 milliGRAM(s) Oral daily  ammonium lactate 12% Lotion 1 Application(s) Topical two times a day  aspirin  chewable 81 milliGRAM(s) Oral daily  atorvastatin 40 milliGRAM(s) Oral at bedtime  heparin   Injectable 5000 Unit(s) SubCutaneous every 12 hours  hydrALAZINE 10 milliGRAM(s) Oral three times a day  insulin lispro (ADMELOG) corrective regimen sliding scale   SubCutaneous three times a day before meals  insulin lispro (ADMELOG) corrective regimen sliding scale   SubCutaneous at bedtime  isosorbide   mononitrate ER Tablet (IMDUR) 30 milliGRAM(s) Oral daily  lactated ringers. 1000 milliLiter(s) (100 mL/Hr) IV Continuous <Continuous>  lidocaine   4% Patch 1 Patch Transdermal every 24 hours  traZODone 25 milliGRAM(s) Oral at bedtime    09-01    140  |  107  |  31<H>  ----------------------------<  231<H>  4.5   |  28  |  1.71<H>    Ca    8.9      01 Sep 2023 09:15  Phos  3.2     09-01  Mg     2.5     09-01    TPro  7.7  /  Alb  3.4<L>  /  TBili  0.4  /  DBili      /  AST  21  /  ALT  21  /  AlkPhos  52  09-01    Creatinine Trend: 1.71 <--, 1.80 <--, 1.99 <--, 1.78 <--, 1.78 <--                        10.3   5.54  )-----------( 219      ( 01 Sep 2023 09:15 )             32.9

## 2023-09-02 LAB
CULTURE RESULTS: SIGNIFICANT CHANGE UP
SPECIMEN SOURCE: SIGNIFICANT CHANGE UP

## 2023-09-05 LAB
CULTURE RESULTS: SIGNIFICANT CHANGE UP
CULTURE RESULTS: SIGNIFICANT CHANGE UP
SPECIMEN SOURCE: SIGNIFICANT CHANGE UP
SPECIMEN SOURCE: SIGNIFICANT CHANGE UP

## 2023-09-27 NOTE — PHYSICAL EXAM
[de-identified] : \par Physical Examination\par General: well nourished, in no acute distress, alert and oriented to person, place and time\par Psychiatric: normal mood and affect, no abnormal movements or speech patterns\par Eyes: vision intact With glasses\par Throat: no thyromegaly\par Lymph: no enlarged nodes, no lymphedema in extremity\par Respiratory: no wheezing, no shortness of breath with ambulation\par Cardiac: no cardiac leg swelling, 2+ peripheral pulses\par Neurology: normal gross sensation in extremities to light touch\par Abdomen: soft, non-tender, tympanic, no masses\par \par Musculoskeletal Examination\par Ambulation	 +  antalgic gait,   - assistive devices\par \par Knee			Right			Left\par General\par      Swelling/Deformity	normal			normal	\par      Skin			normal			normal\par      Erythema		-			-\par      Standing Alignment	valgus			valgus\par      Effusion		trace			trace\par Range of Motion\par      Hip			full painless ROM		full painless ROM\par      Knee Flexion		120			120\par      Knee Extension	5			5\par \par Palpation\par      Medial Joint Line	+			+\par      Medial Fem Condyle	-			-\par      Lateral Joint Line	-			-\par      Quad Tendon		-			-\par      Patella Tendon	-			-\par      Medial Patella		-			-\par      Lateral Patella 	-			-\par      Posterior Knee	-			-\par   \par Strength Examination/Atrophy\par      Hip Flexors 		5+			5+\par      Quadriceps		5+			5+\par      Hamstring		5+			5+\par      Tibialis Anterior	5+			5+\par      Achilles/Soleus	5+			5+\par Sensation\par      Deep Peroneal	normal			normal\par      Superficial Peroneal 	normal			normal\par      Sural  		normal			normal\par      Posterior Tibial 	normal			normal\par      Saphneous 		normal			normal\par Pulses\par      DP			2+			2+\par  [de-identified] : 5 views of the affected bilateral knee (standing AP, flexing standing AP, 30degree flexed lateral, 0degree lateral, sunrise view)\par were ordered, obtained and evaluated by myself today and\par demonstrate:\par \par RIGHT\par There is severe lateral weightbearing asymmetric narrowing\par Small to moderate osteophytic lipping\par Trace to small suprapatellar effusion\par Small to moderate lateral osteophytes with mild patellofemoral joint space loss without evidence of tilt [or] subluxation on sunrise view\par Normal soft tissue density\par Otherwise normal osseous bone structure without fracture or dislocation\par \par LEFT\par There ismoderate lateral weightbearing asymmetric narrowing\par Small to osteophytic lipping\par Trace suprapatellar effusion\par Small to moderate lateral osteophytes with mild patellofemoral joint space loss without evidence of tilt [or] subluxation on sunrise view\par Normal soft tissue density\par Otherwise normal osseous bone structure without fracture or dislocation Detail Level: Detailed no Size Of Lesion: 13x6

## 2024-02-02 ENCOUNTER — INPATIENT (INPATIENT)
Facility: HOSPITAL | Age: 79
LOS: 2 days | Discharge: HOME | DRG: 683 | End: 2024-02-05
Attending: STUDENT IN AN ORGANIZED HEALTH CARE EDUCATION/TRAINING PROGRAM | Admitting: STUDENT IN AN ORGANIZED HEALTH CARE EDUCATION/TRAINING PROGRAM
Payer: COMMERCIAL

## 2024-02-02 VITALS
WEIGHT: 123.02 LBS | OXYGEN SATURATION: 96 % | HEART RATE: 101 BPM | HEIGHT: 61 IN | RESPIRATION RATE: 17 BRPM | SYSTOLIC BLOOD PRESSURE: 168 MMHG | TEMPERATURE: 98 F | DIASTOLIC BLOOD PRESSURE: 79 MMHG

## 2024-02-02 DIAGNOSIS — K57.92 DIVERTICULITIS OF INTESTINE, PART UNSPECIFIED, WITHOUT PERFORATION OR ABSCESS WITHOUT BLEEDING: ICD-10-CM

## 2024-02-02 DIAGNOSIS — F32.A DEPRESSION, UNSPECIFIED: ICD-10-CM

## 2024-02-02 DIAGNOSIS — E11.9 TYPE 2 DIABETES MELLITUS WITHOUT COMPLICATIONS: ICD-10-CM

## 2024-02-02 DIAGNOSIS — E78.5 HYPERLIPIDEMIA, UNSPECIFIED: ICD-10-CM

## 2024-02-02 DIAGNOSIS — N20.0 CALCULUS OF KIDNEY: ICD-10-CM

## 2024-02-02 DIAGNOSIS — Z29.9 ENCOUNTER FOR PROPHYLACTIC MEASURES, UNSPECIFIED: ICD-10-CM

## 2024-02-02 DIAGNOSIS — N17.9 ACUTE KIDNEY FAILURE, UNSPECIFIED: ICD-10-CM

## 2024-02-02 DIAGNOSIS — K80.20 CALCULUS OF GALLBLADDER WITHOUT CHOLECYSTITIS WITHOUT OBSTRUCTION: ICD-10-CM

## 2024-02-02 DIAGNOSIS — I25.10 ATHEROSCLEROTIC HEART DISEASE OF NATIVE CORONARY ARTERY WITHOUT ANGINA PECTORIS: ICD-10-CM

## 2024-02-02 DIAGNOSIS — I10 ESSENTIAL (PRIMARY) HYPERTENSION: ICD-10-CM

## 2024-02-02 LAB
ALBUMIN SERPL ELPH-MCNC: 3.6 G/DL — SIGNIFICANT CHANGE UP (ref 3.5–5)
ALP SERPL-CCNC: 54 U/L — SIGNIFICANT CHANGE UP (ref 40–120)
ALT FLD-CCNC: 31 U/L DA — SIGNIFICANT CHANGE UP (ref 10–60)
ANION GAP SERPL CALC-SCNC: 6 MMOL/L — SIGNIFICANT CHANGE UP (ref 5–17)
APPEARANCE UR: CLEAR — SIGNIFICANT CHANGE UP
AST SERPL-CCNC: 34 U/L — SIGNIFICANT CHANGE UP (ref 10–40)
BACTERIA # UR AUTO: ABNORMAL /HPF
BASOPHILS # BLD AUTO: 0.06 K/UL — SIGNIFICANT CHANGE UP (ref 0–0.2)
BASOPHILS NFR BLD AUTO: 0.7 % — SIGNIFICANT CHANGE UP (ref 0–2)
BILIRUB DIRECT SERPL-MCNC: <0.1 MG/DL — SIGNIFICANT CHANGE UP (ref 0–0.3)
BILIRUB INDIRECT FLD-MCNC: >0.4 MG/DL — SIGNIFICANT CHANGE UP (ref 0.2–1)
BILIRUB SERPL-MCNC: 0.5 MG/DL — SIGNIFICANT CHANGE UP (ref 0.2–1.2)
BILIRUB UR-MCNC: NEGATIVE — SIGNIFICANT CHANGE UP
BUN SERPL-MCNC: 18 MG/DL — SIGNIFICANT CHANGE UP (ref 7–18)
CALCIUM SERPL-MCNC: 9.5 MG/DL — SIGNIFICANT CHANGE UP (ref 8.4–10.5)
CHLORIDE SERPL-SCNC: 106 MMOL/L — SIGNIFICANT CHANGE UP (ref 96–108)
CO2 SERPL-SCNC: 25 MMOL/L — SIGNIFICANT CHANGE UP (ref 22–31)
COLOR SPEC: YELLOW — SIGNIFICANT CHANGE UP
CREAT ?TM UR-MCNC: 111 MG/DL — SIGNIFICANT CHANGE UP
CREAT SERPL-MCNC: 2.2 MG/DL — HIGH (ref 0.5–1.3)
DIFF PNL FLD: NEGATIVE — SIGNIFICANT CHANGE UP
EGFR: 23 ML/MIN/1.73M2 — LOW
EOSINOPHIL # BLD AUTO: 0.1 K/UL — SIGNIFICANT CHANGE UP (ref 0–0.5)
EOSINOPHIL NFR BLD AUTO: 1.2 % — SIGNIFICANT CHANGE UP (ref 0–6)
EPI CELLS # UR: PRESENT
GLUCOSE SERPL-MCNC: 117 MG/DL — HIGH (ref 70–99)
GLUCOSE UR QL: >=1000 MG/DL
GRAN CASTS # UR COMP ASSIST: PRESENT
HCT VFR BLD CALC: 35.8 % — SIGNIFICANT CHANGE UP (ref 34.5–45)
HGB BLD-MCNC: 11.6 G/DL — SIGNIFICANT CHANGE UP (ref 11.5–15.5)
HYALINE CASTS # UR AUTO: PRESENT
IMM GRANULOCYTES NFR BLD AUTO: 0.2 % — SIGNIFICANT CHANGE UP (ref 0–0.9)
KETONES UR-MCNC: ABNORMAL MG/DL
LACTATE SERPL-SCNC: 1.5 MMOL/L — SIGNIFICANT CHANGE UP (ref 0.7–2)
LEUKOCYTE ESTERASE UR-ACNC: NEGATIVE — SIGNIFICANT CHANGE UP
LIDOCAIN IGE QN: 34 U/L — SIGNIFICANT CHANGE UP (ref 13–75)
LYMPHOCYTES # BLD AUTO: 1.97 K/UL — SIGNIFICANT CHANGE UP (ref 1–3.3)
LYMPHOCYTES # BLD AUTO: 23.6 % — SIGNIFICANT CHANGE UP (ref 13–44)
MCHC RBC-ENTMCNC: 31.2 PG — SIGNIFICANT CHANGE UP (ref 27–34)
MCHC RBC-ENTMCNC: 32.4 GM/DL — SIGNIFICANT CHANGE UP (ref 32–36)
MCV RBC AUTO: 96.2 FL — SIGNIFICANT CHANGE UP (ref 80–100)
MONOCYTES # BLD AUTO: 0.74 K/UL — SIGNIFICANT CHANGE UP (ref 0–0.9)
MONOCYTES NFR BLD AUTO: 8.9 % — SIGNIFICANT CHANGE UP (ref 2–14)
NEUTROPHILS # BLD AUTO: 5.44 K/UL — SIGNIFICANT CHANGE UP (ref 1.8–7.4)
NEUTROPHILS NFR BLD AUTO: 65.4 % — SIGNIFICANT CHANGE UP (ref 43–77)
NITRITE UR-MCNC: NEGATIVE — SIGNIFICANT CHANGE UP
NRBC # BLD: 0 /100 WBCS — SIGNIFICANT CHANGE UP (ref 0–0)
PH UR: 5 — SIGNIFICANT CHANGE UP (ref 5–8)
PLATELET # BLD AUTO: 214 K/UL — SIGNIFICANT CHANGE UP (ref 150–400)
POTASSIUM SERPL-MCNC: 5.2 MMOL/L — SIGNIFICANT CHANGE UP (ref 3.5–5.3)
POTASSIUM SERPL-SCNC: 5.2 MMOL/L — SIGNIFICANT CHANGE UP (ref 3.5–5.3)
POTASSIUM UR-SCNC: 25 MMOL/L — SIGNIFICANT CHANGE UP
PROT ?TM UR-MCNC: 92 MG/DL — HIGH (ref 0–12)
PROT SERPL-MCNC: 8.1 G/DL — SIGNIFICANT CHANGE UP (ref 6–8.3)
PROT UR-MCNC: 100 MG/DL
PROT/CREAT UR-RTO: 0.8 RATIO — HIGH (ref 0–0.2)
RAPID RVP RESULT: SIGNIFICANT CHANGE UP
RBC # BLD: 3.72 M/UL — LOW (ref 3.8–5.2)
RBC # FLD: 16.5 % — HIGH (ref 10.3–14.5)
RBC CASTS # UR COMP ASSIST: 2 /HPF — SIGNIFICANT CHANGE UP (ref 0–4)
SARS-COV-2 RNA SPEC QL NAA+PROBE: SIGNIFICANT CHANGE UP
SODIUM SERPL-SCNC: 137 MMOL/L — SIGNIFICANT CHANGE UP (ref 135–145)
SODIUM UR-SCNC: 63 MMOL/L — SIGNIFICANT CHANGE UP
SP GR SPEC: 1.02 — SIGNIFICANT CHANGE UP (ref 1–1.03)
UROBILINOGEN FLD QL: 0.2 MG/DL — SIGNIFICANT CHANGE UP (ref 0.2–1)
WBC # BLD: 8.33 K/UL — SIGNIFICANT CHANGE UP (ref 3.8–10.5)
WBC # FLD AUTO: 8.33 K/UL — SIGNIFICANT CHANGE UP (ref 3.8–10.5)
WBC UR QL: 6 /HPF — HIGH (ref 0–5)

## 2024-02-02 PROCEDURE — 99223 1ST HOSP IP/OBS HIGH 75: CPT | Mod: GC

## 2024-02-02 PROCEDURE — 99285 EMERGENCY DEPT VISIT HI MDM: CPT

## 2024-02-02 PROCEDURE — 74176 CT ABD & PELVIS W/O CONTRAST: CPT | Mod: 26,MA

## 2024-02-02 RX ORDER — SIMVASTATIN 20 MG/1
1 TABLET, FILM COATED ORAL
Refills: 0 | DISCHARGE

## 2024-02-02 RX ORDER — DIPHENHYDRAMINE HCL 50 MG
25 CAPSULE ORAL ONCE
Refills: 0 | Status: COMPLETED | OUTPATIENT
Start: 2024-02-02 | End: 2024-02-02

## 2024-02-02 RX ORDER — HYDRALAZINE HCL 50 MG
10 TABLET ORAL THREE TIMES A DAY
Refills: 0 | Status: DISCONTINUED | OUTPATIENT
Start: 2024-02-02 | End: 2024-02-05

## 2024-02-02 RX ORDER — ATORVASTATIN CALCIUM 80 MG/1
40 TABLET, FILM COATED ORAL AT BEDTIME
Refills: 0 | Status: DISCONTINUED | OUTPATIENT
Start: 2024-02-02 | End: 2024-02-05

## 2024-02-02 RX ORDER — DILTIAZEM HCL 120 MG
1 CAPSULE, EXT RELEASE 24 HR ORAL
Refills: 0 | DISCHARGE

## 2024-02-02 RX ORDER — ISOSORBIDE MONONITRATE 60 MG/1
1 TABLET, EXTENDED RELEASE ORAL
Refills: 0 | DISCHARGE

## 2024-02-02 RX ORDER — ATORVASTATIN CALCIUM 80 MG/1
1 TABLET, FILM COATED ORAL
Refills: 0 | DISCHARGE

## 2024-02-02 RX ORDER — DILTIAZEM HCL 120 MG
240 CAPSULE, EXT RELEASE 24 HR ORAL DAILY
Refills: 0 | Status: DISCONTINUED | OUTPATIENT
Start: 2024-02-02 | End: 2024-02-05

## 2024-02-02 RX ORDER — HEPARIN SODIUM 5000 [USP'U]/ML
5000 INJECTION INTRAVENOUS; SUBCUTANEOUS EVERY 8 HOURS
Refills: 0 | Status: DISCONTINUED | OUTPATIENT
Start: 2024-02-02 | End: 2024-02-05

## 2024-02-02 RX ORDER — ISOSORBIDE MONONITRATE 60 MG/1
30 TABLET, EXTENDED RELEASE ORAL DAILY
Refills: 0 | Status: DISCONTINUED | OUTPATIENT
Start: 2024-02-02 | End: 2024-02-05

## 2024-02-02 RX ORDER — ALLOPURINOL 300 MG
1 TABLET ORAL
Refills: 0 | DISCHARGE

## 2024-02-02 RX ORDER — ONDANSETRON 8 MG/1
4 TABLET, FILM COATED ORAL ONCE
Refills: 0 | Status: COMPLETED | OUTPATIENT
Start: 2024-02-02 | End: 2024-02-02

## 2024-02-02 RX ORDER — DAPAGLIFLOZIN 10 MG/1
1 TABLET, FILM COATED ORAL
Refills: 0 | DISCHARGE

## 2024-02-02 RX ORDER — METRONIDAZOLE 500 MG
500 TABLET ORAL ONCE
Refills: 0 | Status: COMPLETED | OUTPATIENT
Start: 2024-02-02 | End: 2024-02-02

## 2024-02-02 RX ORDER — SODIUM CHLORIDE 9 MG/ML
1000 INJECTION, SOLUTION INTRAVENOUS
Refills: 0 | Status: DISCONTINUED | OUTPATIENT
Start: 2024-02-02 | End: 2024-02-05

## 2024-02-02 RX ORDER — CIPROFLOXACIN LACTATE 400MG/40ML
200 VIAL (ML) INTRAVENOUS ONCE
Refills: 0 | Status: COMPLETED | OUTPATIENT
Start: 2024-02-02 | End: 2024-02-02

## 2024-02-02 RX ORDER — ALLOPURINOL 300 MG
0 TABLET ORAL
Refills: 0 | DISCHARGE

## 2024-02-02 RX ORDER — TRAZODONE HCL 50 MG
50 TABLET ORAL DAILY
Refills: 0 | Status: DISCONTINUED | OUTPATIENT
Start: 2024-02-02 | End: 2024-02-05

## 2024-02-02 RX ORDER — ALLOPURINOL 300 MG
100 TABLET ORAL
Refills: 0 | Status: DISCONTINUED | OUTPATIENT
Start: 2024-02-02 | End: 2024-02-05

## 2024-02-02 RX ORDER — NINTEDANIB 100 MG/1
1 CAPSULE ORAL
Refills: 0 | DISCHARGE

## 2024-02-02 RX ORDER — SODIUM CHLORIDE 9 MG/ML
1000 INJECTION INTRAMUSCULAR; INTRAVENOUS; SUBCUTANEOUS ONCE
Refills: 0 | Status: COMPLETED | OUTPATIENT
Start: 2024-02-02 | End: 2024-02-02

## 2024-02-02 RX ORDER — HYDRALAZINE HCL 50 MG
1 TABLET ORAL
Refills: 0 | DISCHARGE

## 2024-02-02 RX ORDER — CIPROFLOXACIN LACTATE 400MG/40ML
400 VIAL (ML) INTRAVENOUS EVERY 12 HOURS
Refills: 0 | Status: DISCONTINUED | OUTPATIENT
Start: 2024-02-02 | End: 2024-02-05

## 2024-02-02 RX ORDER — METRONIDAZOLE 500 MG
500 TABLET ORAL EVERY 8 HOURS
Refills: 0 | Status: DISCONTINUED | OUTPATIENT
Start: 2024-02-02 | End: 2024-02-05

## 2024-02-02 RX ADMIN — ONDANSETRON 4 MILLIGRAM(S): 8 TABLET, FILM COATED ORAL at 14:45

## 2024-02-02 RX ADMIN — Medication 25 MILLIGRAM(S): at 22:57

## 2024-02-02 RX ADMIN — Medication 10 MILLIGRAM(S): at 22:31

## 2024-02-02 RX ADMIN — SODIUM CHLORIDE 1000 MILLILITER(S): 9 INJECTION INTRAMUSCULAR; INTRAVENOUS; SUBCUTANEOUS at 15:45

## 2024-02-02 RX ADMIN — SODIUM CHLORIDE 1000 MILLILITER(S): 9 INJECTION INTRAMUSCULAR; INTRAVENOUS; SUBCUTANEOUS at 14:45

## 2024-02-02 RX ADMIN — HEPARIN SODIUM 5000 UNIT(S): 5000 INJECTION INTRAVENOUS; SUBCUTANEOUS at 22:31

## 2024-02-02 RX ADMIN — ATORVASTATIN CALCIUM 40 MILLIGRAM(S): 80 TABLET, FILM COATED ORAL at 22:31

## 2024-02-02 RX ADMIN — Medication 200 MILLIGRAM(S): at 17:07

## 2024-02-02 RX ADMIN — SODIUM CHLORIDE 75 MILLILITER(S): 9 INJECTION, SOLUTION INTRAVENOUS at 19:44

## 2024-02-02 RX ADMIN — Medication 100 MILLIGRAM(S): at 16:07

## 2024-02-02 RX ADMIN — Medication 100 MILLIGRAM(S): at 19:43

## 2024-02-02 NOTE — ED PROVIDER NOTE - PROGRESS NOTE DETAILS
Patient workup found to have uncomplicated diverticulitis.  Given her ability to tolerate limited if any p.o. to date over the last week and her age along with generalized weakness will admit for further management.

## 2024-02-02 NOTE — ED ADULT TRIAGE NOTE - CHIEF COMPLAINT QUOTE
patient sent by PCP for lower abdominal pain with NVD x 1 week patient sent by PCP for lower abdominal pain with NVD x 1 week. patient also reports coffee ground vomit and stool

## 2024-02-02 NOTE — H&P ADULT - PROBLEM SELECTOR PLAN 2
p/w Cr 2.2  baseline Cr 1.7  - likely prerenal 2/2 poor PO intake and diarrhea iso diverticulitis.  - urine studies.   - IVF.

## 2024-02-02 NOTE — H&P ADULT - HISTORY OF PRESENT ILLNESS
77F, from home, ambulates with cane, with PMhx HTN, T2DM, CAD, pulmonary fibrosis, depression who presents with 1 week duration of lower abdominal pain. Associated with nausea and 3-4 episodes of nonbloody diarrhea per day. Abdominal pain described as churning, comes and goes. Worse after food. No new medications or foods. No recent travel or laxative use. No recent abx or PPI use. Never happened before. For past 3 days, took Tylenol every 4 hours with minimal improvement of abd pain. Endorses dizziness, mild headache, runny nose and nonproductive cough that started this week. Denies fever, chest pain, palpitations, SOB, dysuria, numbness/tingling in extremities.

## 2024-02-02 NOTE — ED ADULT NURSE REASSESSMENT NOTE - NS ED NURSE REASSESS COMMENT FT1
pt moved to private room, placed on contact precautions. pt aware stool sample ordered. pt rpts itching to arms and back, no hives noted. no airway involvement. pt requesting benadryl. NP messaged for orders.
rpt received, assumed care 1930. pt A&OX4. pt resting in bed in NAD att. pt denies abd pain. bed locked and in lowest position for safety. p admit bed.

## 2024-02-02 NOTE — H&P ADULT - PROBLEM SELECTOR PLAN 1
p/w 1 week duration of lower abdominal pain. Associated with nausea and 3-4 episodes of nonbloody diarrhea per day.  VSS, no leukocytosis.   CT abd pelvis - Pericolonic stranding associated with some proximal sigmoid diverticula consistent with acute diverticulitis.  s/p cipro, flagyll in ED.  s/p 1 L NS bolus in ED.    - c/w cipro and flagyl.   - c/w IVF.  - Zofran PRN for nasuea. p/w 1 week duration of lower abdominal pain. Associated with nausea and 3-4 episodes of nonbloody diarrhea per day.  VSS, no leukocytosis.   CT abd pelvis - Pericolonic stranding associated with some proximal sigmoid diverticula consistent with acute diverticulitis.  s/p cipro, flagyll in ED.  s/p 1 L NS bolus in ED.    - c/w cipro and flagyl.   - c/w IVF.  - Zofran PRN for nasuea.  - stool studies, O&P, GI PCR. p/w 1 week duration of lower abdominal pain. Associated with nausea and 3-4 episodes of nonbloody diarrhea per day.  VSS, no leukocytosis.   CT abd pelvis - Pericolonic stranding associated with some proximal sigmoid diverticula consistent with acute diverticulitis.  s/p cipro, flagyll in ED.  s/p 1 L NS bolus in ED.    - c/w cipro and flagyl.   - c/w IVF.  - Zofran PRN for nasuea.  - stool studies, O&P, GI PCR, C diff.

## 2024-02-02 NOTE — H&P ADULT - NSHPPHYSICALEXAM_GEN_ALL_CORE
GENERAL: NAD  HEAD:  Atraumatic, Normocephalic  EYES: EOMI, PERRLA, conjunctiva and sclera clear  ENMT: No tonsillar erythema, exudates, or enlargement, +dry mucous membranes.  NECK: Supple, normal appearance, No JVD; Normal thyroid; Trachea midline  NERVOUS SYSTEM:  Alert & Oriented X3,  Motor Strength 5/5 B/L upper and lower extremities, sensation intact  CHEST/LUNG: Lungs clear to auscultation bilaterally, No rales, rhonchi, wheezing   HEART: Regular rate and rhythm; No murmurs, rubs, or gallops  ABDOMEN: +TTP LLQ, RLQ, no rebound or guarding. Soft, Nontender, Nondistended; Bowel sounds present  EXTREMITIES:  2+ Peripheral Pulses, No clubbing, cyanosis, or edema  LYMPH: No lymphadenopathy noted  SKIN: No rashes or lesions;  Good capillary refill

## 2024-02-02 NOTE — ED ADULT NURSE NOTE - OBJECTIVE STATEMENT
Patient presented to the ED complaining of lower abdominal pain with nausea, vomiting, and diarrhea with black stool according to patient for 1 week. Patient states she feels dehydrated due to not being able to keep anything down.

## 2024-02-02 NOTE — H&P ADULT - PROBLEM SELECTOR PLAN 7
CT abd pelvis - showing cholelithiasis.   - no RUQ pain currently or suspicion for cholecystitis.   - monitor for now.

## 2024-02-02 NOTE — ED PROVIDER NOTE - OBJECTIVE STATEMENT
78-year-old woman with a past medical history of hypertension, diabetes, interstitial lung disease, multiple sclerosis presenting complaining of 1 week of lower abdominal pain associated with bloody nonbilious vomiting and nonbloody diarrhea.  She tried some Pepto-Bismol at home and reports that her symptoms improved for about 1 hour and then rapidly returned.  She saw her primary care doctor today who wanted her to present to the emergency department for evaluation.

## 2024-02-02 NOTE — ED PROVIDER NOTE - PHYSICAL EXAMINATION
Exam:  General: Patient well appearing, vital signs within normal limits  HEENT: airway patent with moist mucous membranes  Cardiac: RRR S1/S2 with strong peripheral pulses  Respiratory: lungs clear without respiratory distress  GI: abdomen soft, diffusely tender throughout lower quadrants, non distended  Neuro: no gross neurologic deficits  Skin: warm, well perfused  Psych: normal mood and affect

## 2024-02-02 NOTE — ED ADULT NURSE NOTE - CAS TRG GENERAL AIRWAY, MLM
The patient denies complaints.  She has recovered well from her appendectomy.  Her exam is normal.  Her abdomen is soft and nontender.  Her incisions are healing beautifully.  Some loose Dermabond was peeled away and removed.  We are going to repeat a TSH today.  She is instructed to continue her oral iron therapy.  I gave her instructions on taking this.  I will repeat a CBC at her next visit.  She needs to make a follow-up appointment with General surgery and I encouraged her to do this.  She is also going to schedule a meet and greet appointment with Dr. Coleman.  She will follow up with me in 4 weeks.  
Patent

## 2024-02-02 NOTE — H&P ADULT - ATTENDING COMMENTS
Patient was seen and examined at bedside. Presents with diarrhea, abd pain, n/v with weight loss. Denies fever, chills, blood in stool. Never had any colonoscopy. Denies any urinary symptoms     ICU Vital Signs Last 24 Hrs  T(C): 36.8 (02 Feb 2024 15:39), Max: 36.8 (02 Feb 2024 11:21)  T(F): 98.3 (02 Feb 2024 15:39), Max: 98.3 (02 Feb 2024 15:39)  HR: 85 (02 Feb 2024 15:39) (85 - 101)  BP: 138/71 (02 Feb 2024 15:39) (138/71 - 168/79)  BP(mean): --  ABP: --  ABP(mean): --  RR: 17 (02 Feb 2024 15:39) (17 - 17)  SpO2: 96% (02 Feb 2024 15:39) (96% - 96%)    O2 Parameters below as of 02 Feb 2024 15:39  Patient On (Oxygen Delivery Method): room air    P/E:  NAD  AAOx3, no focal deficit   CTABL  S1S2 WNL  Abd soft, non tender on my exam, BS present   BLLE no edema or calf tenderness     Labs noted     A/P:  Acute sigmoid diverticulitis   DENIS  Non obstructive nephrolithiasis  HTN  HLD  DM  Pulm fibrosis   CAD    Plan:   Cont Cipro and flagyl, cont IVF; already tolerated solid food in ER, was eating chicken and rice without any abd pain- although no diet was ordered in EMR  Send stool studies, including C. diff , although low suspicion   GI f/u as outpatient for colonoscopy  Monitor renal function after IVF  Cont home meds for BP with holding parameters  ISS   Cont home meds for CAD, HLD  Rest of the management as above    Discussed the plan with MAR

## 2024-02-02 NOTE — ED ADULT NURSE NOTE - CHIEF COMPLAINT QUOTE
patient sent by PCP for lower abdominal pain with NVD x 1 week. patient also reports coffee ground vomit and stool

## 2024-02-02 NOTE — ED PROVIDER NOTE - CLINICAL SUMMARY MEDICAL DECISION MAKING FREE TEXT BOX
Patient presenting with 1 week of GI symptoms with diffuse lower abdominal tenderness and difficulty tolerating p.o.  Plan for labs, IV fluids, antiemetics, CT abdomen pelvis and reevaluate.

## 2024-02-02 NOTE — H&P ADULT - NSHPREVIEWOFSYSTEMS_GEN_ALL_CORE
CONSTITUTIONAL: No fever, weight loss, or fatigue  RESPIRATORY: +cough. No wheezing, chills, or hemoptysis; No shortness of breath  CARDIOVASCULAR: +dizziness. No chest pain, palpitations, or leg swelling  GASTROINTESTINAL: +abdominal pain, +diarrhea. No nausea, vomiting, or hematemesis. No melena or hematochezia.  GENITOURINARY: No dysuria or hematuria, urinary frequency  NEUROLOGICAL: No headaches, memory loss, loss of strength, numbness, or tremors  ENDOCRINE: No polyuria, polydipsia, or heat/cold intolerance  MUSKULOSKELETAL: No muscle aches, joint pains  HEME: no easy bruisability, no tender or enlarged lymph nodes  SKIN: No itching, burning, rashes, or lesions.

## 2024-02-02 NOTE — ED ADULT NURSE NOTE - NSFALLRISKINTERV_ED_ALL_ED

## 2024-02-02 NOTE — ED ADULT NURSE NOTE - NSFALLRISKASMT_ED_ALL_ED_DT
Telephone Encounter by Harshal Moon at 07/03/17 07:54 AM     Author:  Nidia Beal, [facility] Service:  (none) Author Type:  Certified Medical Assistant     Filed:  07/03/17 07:54 AM Encounter Date:  7/1/2017 Status:  Signed     :  Harshal Moon (Certified Medical Assistant)       From: Marysol Truong  To: Steve Saucedo., MD  Sent: 7/1/2017  9:51 AM CDT  Subject: Medication Questions    Hi,    Feeling better with Cymbalta 30mg. Will continue with the last refill of   30 mg  bottle. Therapy going well, less pain and stiffness able to more   things. Definitely much better than 3 months ago. I'll update you in in couple weeks for a refill of Cymbalta, if I continue   to feel good just stay on 30mg? Revision History        Date/Time User Provider Type Action    > 07/03/17 07:54 AM Harshal Moon Certified Medical Assistant Sign    Attribution information within the note text is not available.
02-Feb-2024 12:36

## 2024-02-03 LAB
A1C WITH ESTIMATED AVERAGE GLUCOSE RESULT: 6.6 % — HIGH (ref 4–5.6)
ANION GAP SERPL CALC-SCNC: 7 MMOL/L — SIGNIFICANT CHANGE UP (ref 5–17)
BUN SERPL-MCNC: 13 MG/DL — SIGNIFICANT CHANGE UP (ref 7–18)
CALCIUM SERPL-MCNC: 9 MG/DL — SIGNIFICANT CHANGE UP (ref 8.4–10.5)
CHLORIDE SERPL-SCNC: 107 MMOL/L — SIGNIFICANT CHANGE UP (ref 96–108)
CO2 SERPL-SCNC: 26 MMOL/L — SIGNIFICANT CHANGE UP (ref 22–31)
CREAT SERPL-MCNC: 1.72 MG/DL — HIGH (ref 0.5–1.3)
EGFR: 30 ML/MIN/1.73M2 — LOW
ESTIMATED AVERAGE GLUCOSE: 143 MG/DL — HIGH (ref 68–114)
GI PCR PANEL: SIGNIFICANT CHANGE UP
GLUCOSE SERPL-MCNC: 136 MG/DL — HIGH (ref 70–99)
HCT VFR BLD CALC: 35.9 % — SIGNIFICANT CHANGE UP (ref 34.5–45)
HGB BLD-MCNC: 11.4 G/DL — LOW (ref 11.5–15.5)
MAGNESIUM SERPL-MCNC: 2.1 MG/DL — SIGNIFICANT CHANGE UP (ref 1.6–2.6)
MCHC RBC-ENTMCNC: 30.4 PG — SIGNIFICANT CHANGE UP (ref 27–34)
MCHC RBC-ENTMCNC: 31.8 GM/DL — LOW (ref 32–36)
MCV RBC AUTO: 95.7 FL — SIGNIFICANT CHANGE UP (ref 80–100)
NRBC # BLD: 0 /100 WBCS — SIGNIFICANT CHANGE UP (ref 0–0)
OSMOLALITY UR: 517 MOSM/KG — SIGNIFICANT CHANGE UP (ref 50–1200)
PHOSPHATE SERPL-MCNC: 3.1 MG/DL — SIGNIFICANT CHANGE UP (ref 2.5–4.5)
PLATELET # BLD AUTO: 201 K/UL — SIGNIFICANT CHANGE UP (ref 150–400)
POTASSIUM SERPL-MCNC: 3.7 MMOL/L — SIGNIFICANT CHANGE UP (ref 3.5–5.3)
POTASSIUM SERPL-SCNC: 3.7 MMOL/L — SIGNIFICANT CHANGE UP (ref 3.5–5.3)
RBC # BLD: 3.75 M/UL — LOW (ref 3.8–5.2)
RBC # FLD: 16.9 % — HIGH (ref 10.3–14.5)
SODIUM SERPL-SCNC: 140 MMOL/L — SIGNIFICANT CHANGE UP (ref 135–145)
WBC # BLD: 6.91 K/UL — SIGNIFICANT CHANGE UP (ref 3.8–10.5)
WBC # FLD AUTO: 6.91 K/UL — SIGNIFICANT CHANGE UP (ref 3.8–10.5)

## 2024-02-03 PROCEDURE — 99232 SBSQ HOSP IP/OBS MODERATE 35: CPT | Mod: GC

## 2024-02-03 RX ADMIN — Medication 240 MILLIGRAM(S): at 06:18

## 2024-02-03 RX ADMIN — Medication 100 MILLIGRAM(S): at 12:17

## 2024-02-03 RX ADMIN — SODIUM CHLORIDE 75 MILLILITER(S): 9 INJECTION, SOLUTION INTRAVENOUS at 04:05

## 2024-02-03 RX ADMIN — Medication 10 MILLIGRAM(S): at 21:24

## 2024-02-03 RX ADMIN — Medication 200 MILLIGRAM(S): at 17:01

## 2024-02-03 RX ADMIN — Medication 100 MILLIGRAM(S): at 04:02

## 2024-02-03 RX ADMIN — Medication 100 MILLIGRAM(S): at 06:18

## 2024-02-03 RX ADMIN — ATORVASTATIN CALCIUM 40 MILLIGRAM(S): 80 TABLET, FILM COATED ORAL at 21:23

## 2024-02-03 RX ADMIN — HEPARIN SODIUM 5000 UNIT(S): 5000 INJECTION INTRAVENOUS; SUBCUTANEOUS at 06:18

## 2024-02-03 RX ADMIN — ISOSORBIDE MONONITRATE 30 MILLIGRAM(S): 60 TABLET, EXTENDED RELEASE ORAL at 12:16

## 2024-02-03 RX ADMIN — Medication 10 MILLIGRAM(S): at 14:08

## 2024-02-03 RX ADMIN — Medication 10 MILLIGRAM(S): at 17:03

## 2024-02-03 RX ADMIN — Medication 100 MILLIGRAM(S): at 17:02

## 2024-02-03 RX ADMIN — HEPARIN SODIUM 5000 UNIT(S): 5000 INJECTION INTRAVENOUS; SUBCUTANEOUS at 14:08

## 2024-02-03 RX ADMIN — Medication 50 MILLIGRAM(S): at 12:16

## 2024-02-03 RX ADMIN — Medication 200 MILLIGRAM(S): at 06:19

## 2024-02-03 RX ADMIN — HEPARIN SODIUM 5000 UNIT(S): 5000 INJECTION INTRAVENOUS; SUBCUTANEOUS at 21:23

## 2024-02-03 RX ADMIN — Medication 10 MILLIGRAM(S): at 06:18

## 2024-02-03 RX ADMIN — Medication 100 MILLIGRAM(S): at 21:23

## 2024-02-03 RX ADMIN — Medication 10 MILLIGRAM(S): at 21:25

## 2024-02-03 RX ADMIN — Medication 100 MILLIGRAM(S): at 14:08

## 2024-02-03 NOTE — CONSULT NOTE ADULT - ASSESSMENT
1. Abdominal pain  2. Acute sigmoid diverticulitis  3. Colitis less likely  4. Anemia  5. No evidence of acute GI bleeding  6. Cholelithiasis (asymptomatic)    Suggestions:    1. Continue antibiotics  2. Monitor electrolytes  3. Protonix daily  4. Avoid NSAID  5. Monitor H/H  6. Transfuse PRBC as needed  7. Diet as tolerated  8. DVT prophylaxis

## 2024-02-03 NOTE — PROGRESS NOTE ADULT - ATTENDING SUPERVISION STATEMENT
Resident A-T Advancement Flap Text: The defect edges were debeveled with a #15 scalpel blade.  Given the location of the defect, shape of the defect and the proximity to free margins an A-T advancement flap was deemed most appropriate.  Using a sterile surgical marker, an appropriate advancement flap was drawn incorporating the defect and placing the expected incisions within the relaxed skin tension lines where possible.    The area thus outlined was incised deep to adipose tissue with a #15 scalpel blade.  The skin margins were undermined to an appropriate distance in all directions utilizing iris scissors.

## 2024-02-03 NOTE — PROGRESS NOTE ADULT - ATTENDING COMMENTS
Patient was seen and examined at bedside. Abd pain has resolved, had one BM with formed stool today. Tolerating diet     ICU Vital Signs Last 24 Hrs  T(C): 37.4 (03 Feb 2024 14:32), Max: 37.4 (03 Feb 2024 14:32)  T(F): 99.3 (03 Feb 2024 14:32), Max: 99.3 (03 Feb 2024 14:32)  HR: 80 (03 Feb 2024 14:32) (76 - 80)  BP: 139/65 (03 Feb 2024 14:32) (122/63 - 159/82)  BP(mean): --  ABP: --  ABP(mean): --  RR: 17 (03 Feb 2024 14:32) (17 - 18)  SpO2: 95% (03 Feb 2024 14:32) (95% - 99%)    O2 Parameters below as of 03 Feb 2024 14:32  Patient On (Oxygen Delivery Method): room air      P/E:  NAD  AAOx3, no focal deficit   CTABL  S1S2 WNL  Abd soft, non tender, BS present   BLLE no edema or calf tenderness     Labs noted     A/P:  Acute sigmoid diverticulitis   DENIS  Non obstructive nephrolithiasis  HTN  HLD  DM  Pulm fibrosis   CAD    Plan:   Symptoms resolved  Cont Cipro and flagyl, cont IVF; stool studies neg so far  GI f/u as outpatient for colonoscopy  Renal function improving after IVF  Cont home meds for BP with holding parameters  ISS   Cont home meds for CAD, HLD  Rest of the management as above

## 2024-02-03 NOTE — PROGRESS NOTE ADULT - PROBLEM SELECTOR PLAN 2
p/w Cr 2.2  baseline Cr 1.7  - likely prerenal 2/2 poor PO intake and diarrhea iso diverticulitis.  - f/u urine studies.   - s/p IVF.  - Cr downtrending 1.7, now back at baseline.

## 2024-02-03 NOTE — PROGRESS NOTE ADULT - PROBLEM SELECTOR PLAN 1
p/w 1 week duration of lower abdominal pain. Associated with nausea and 3-4 episodes of nonbloody diarrhea per day.  VSS, no leukocytosis.   CT abd pelvis - Pericolonic stranding associated with some proximal sigmoid diverticula consistent with acute diverticulitis.  s/p cipro, flagyll in ED.  s/p 1 L NS bolus in ED.    - c/w cipro and flagyl.   - c/w IVF.  - Zofran PRN for nasuea.  - f/u stool studies, O&P, GI PCR, C diff.

## 2024-02-03 NOTE — CHART NOTE - NSCHARTNOTEFT_GEN_A_CORE
EVENT:   2/2/24, 10: 45 pm, patient c/o itching and requested Benadryl.   HPI:  77F, from home, ambulates with cane, with PMH HTN, T2DM, CAD, pulmonary fibrosis, depression who presents with 1 week duration of lower abdominal pain. Associated with nausea and 3-4 episodes of nonbloody diarrhea per day. Abdominal pain described as churning, comes and goes. Worse after food. No new medications or foods. No recent travel or laxative use. No recent Abx or PPI use. Never happened before. For past 3 days, took Tylenol every 4 hours with minimal improvement of abd pain. Endorses dizziness, mild headache, runny nose and nonproductive cough that started this week.   OBJECTIVE:  Vital Signs Last 24 Hrs  T(C): 37.2 (03 Feb 2024 02:45), Max: 37.3 (02 Feb 2024 20:06)  T(F): 99 (03 Feb 2024 02:45), Max: 99.2 (02 Feb 2024 20:06)  HR: 78 (03 Feb 2024 02:45) (76 - 101)  BP: 136/68 (03 Feb 2024 02:45) (122/63 - 168/79)  BP(mean): --  RR: 18 (03 Feb 2024 02:45) (17 - 18)  SpO2: 97% (03 Feb 2024 02:45) (96% - 98%)    Parameters below as of 03 Feb 2024 02:45  Patient On (Oxygen Delivery Method): room air        FOCUSED PHYSICAL EXAM:    LABS:                        11.6   8.33  )-----------( 214      ( 02 Feb 2024 13:05 )             35.8     02-02    137  |  106  |  18  ----------------------------<  117<H>  5.2   |  25  |  2.20<H>    Ca    9.5      02 Feb 2024 13:05    TPro  8.1  /  Alb  3.6  /  TBili  0.5  /  DBili  <0.1  /  AST  34  /  ALT  31  /  AlkPhos      PLAN:   - Benadryl 25 mg po x 1 dose for c/o itching.     FOLLOW UP / RESULT:   - Re-evaluate patient comfort level,   - Continue supportive care and treatment.

## 2024-02-04 LAB
ANION GAP SERPL CALC-SCNC: 6 MMOL/L — SIGNIFICANT CHANGE UP (ref 5–17)
BUN SERPL-MCNC: 10 MG/DL — SIGNIFICANT CHANGE UP (ref 7–18)
CALCIUM SERPL-MCNC: 8.7 MG/DL — SIGNIFICANT CHANGE UP (ref 8.4–10.5)
CHLORIDE SERPL-SCNC: 108 MMOL/L — SIGNIFICANT CHANGE UP (ref 96–108)
CO2 SERPL-SCNC: 27 MMOL/L — SIGNIFICANT CHANGE UP (ref 22–31)
CREAT SERPL-MCNC: 1.52 MG/DL — HIGH (ref 0.5–1.3)
EGFR: 35 ML/MIN/1.73M2 — LOW
GLUCOSE SERPL-MCNC: 103 MG/DL — HIGH (ref 70–99)
HCT VFR BLD CALC: 31.2 % — LOW (ref 34.5–45)
HGB BLD-MCNC: 10 G/DL — LOW (ref 11.5–15.5)
MAGNESIUM SERPL-MCNC: 2 MG/DL — SIGNIFICANT CHANGE UP (ref 1.6–2.6)
MCHC RBC-ENTMCNC: 30.9 PG — SIGNIFICANT CHANGE UP (ref 27–34)
MCHC RBC-ENTMCNC: 32.1 GM/DL — SIGNIFICANT CHANGE UP (ref 32–36)
MCV RBC AUTO: 96.3 FL — SIGNIFICANT CHANGE UP (ref 80–100)
NRBC # BLD: 0 /100 WBCS — SIGNIFICANT CHANGE UP (ref 0–0)
PHOSPHATE SERPL-MCNC: 2.8 MG/DL — SIGNIFICANT CHANGE UP (ref 2.5–4.5)
PLATELET # BLD AUTO: 202 K/UL — SIGNIFICANT CHANGE UP (ref 150–400)
POTASSIUM SERPL-MCNC: 3.9 MMOL/L — SIGNIFICANT CHANGE UP (ref 3.5–5.3)
POTASSIUM SERPL-SCNC: 3.9 MMOL/L — SIGNIFICANT CHANGE UP (ref 3.5–5.3)
RBC # BLD: 3.24 M/UL — LOW (ref 3.8–5.2)
RBC # FLD: 16.4 % — HIGH (ref 10.3–14.5)
SODIUM SERPL-SCNC: 141 MMOL/L — SIGNIFICANT CHANGE UP (ref 135–145)
WBC # BLD: 7.57 K/UL — SIGNIFICANT CHANGE UP (ref 3.8–10.5)
WBC # FLD AUTO: 7.57 K/UL — SIGNIFICANT CHANGE UP (ref 3.8–10.5)

## 2024-02-04 PROCEDURE — 99232 SBSQ HOSP IP/OBS MODERATE 35: CPT | Mod: GC

## 2024-02-04 RX ADMIN — Medication 100 MILLIGRAM(S): at 05:12

## 2024-02-04 RX ADMIN — Medication 200 MILLIGRAM(S): at 18:42

## 2024-02-04 RX ADMIN — Medication 10 MILLIGRAM(S): at 21:41

## 2024-02-04 RX ADMIN — Medication 240 MILLIGRAM(S): at 05:11

## 2024-02-04 RX ADMIN — Medication 10 MILLIGRAM(S): at 21:42

## 2024-02-04 RX ADMIN — Medication 100 MILLIGRAM(S): at 05:11

## 2024-02-04 RX ADMIN — ISOSORBIDE MONONITRATE 30 MILLIGRAM(S): 60 TABLET, EXTENDED RELEASE ORAL at 12:52

## 2024-02-04 RX ADMIN — Medication 10 MILLIGRAM(S): at 05:11

## 2024-02-04 RX ADMIN — SODIUM CHLORIDE 75 MILLILITER(S): 9 INJECTION, SOLUTION INTRAVENOUS at 05:24

## 2024-02-04 RX ADMIN — HEPARIN SODIUM 5000 UNIT(S): 5000 INJECTION INTRAVENOUS; SUBCUTANEOUS at 21:41

## 2024-02-04 RX ADMIN — Medication 100 MILLIGRAM(S): at 18:42

## 2024-02-04 RX ADMIN — HEPARIN SODIUM 5000 UNIT(S): 5000 INJECTION INTRAVENOUS; SUBCUTANEOUS at 05:12

## 2024-02-04 RX ADMIN — Medication 200 MILLIGRAM(S): at 05:12

## 2024-02-04 RX ADMIN — ATORVASTATIN CALCIUM 40 MILLIGRAM(S): 80 TABLET, FILM COATED ORAL at 21:41

## 2024-02-04 RX ADMIN — Medication 10 MILLIGRAM(S): at 13:55

## 2024-02-04 RX ADMIN — Medication 100 MILLIGRAM(S): at 13:51

## 2024-02-04 RX ADMIN — Medication 100 MILLIGRAM(S): at 21:45

## 2024-02-04 RX ADMIN — Medication 50 MILLIGRAM(S): at 12:52

## 2024-02-04 RX ADMIN — SODIUM CHLORIDE 75 MILLILITER(S): 9 INJECTION, SOLUTION INTRAVENOUS at 23:45

## 2024-02-04 NOTE — PROGRESS NOTE ADULT - PSYCHIATRIC
details… normal affect/alert and oriented x3/normal behavior Libtayo Pregnancy And Lactation Text: This medication is contraindicated in pregnancy and when breast feeding.

## 2024-02-05 ENCOUNTER — TRANSCRIPTION ENCOUNTER (OUTPATIENT)
Age: 79
End: 2024-02-05

## 2024-02-05 VITALS
TEMPERATURE: 98 F | HEART RATE: 83 BPM | DIASTOLIC BLOOD PRESSURE: 69 MMHG | OXYGEN SATURATION: 97 % | SYSTOLIC BLOOD PRESSURE: 113 MMHG | RESPIRATION RATE: 18 BRPM

## 2024-02-05 LAB
ANION GAP SERPL CALC-SCNC: 7 MMOL/L — SIGNIFICANT CHANGE UP (ref 5–17)
BUN SERPL-MCNC: 10 MG/DL — SIGNIFICANT CHANGE UP (ref 7–18)
CALCIUM SERPL-MCNC: 8.6 MG/DL — SIGNIFICANT CHANGE UP (ref 8.4–10.5)
CHLORIDE SERPL-SCNC: 108 MMOL/L — SIGNIFICANT CHANGE UP (ref 96–108)
CO2 SERPL-SCNC: 26 MMOL/L — SIGNIFICANT CHANGE UP (ref 22–31)
CREAT SERPL-MCNC: 1.5 MG/DL — HIGH (ref 0.5–1.3)
CULTURE RESULTS: SIGNIFICANT CHANGE UP
EGFR: 36 ML/MIN/1.73M2 — LOW
GLUCOSE SERPL-MCNC: 131 MG/DL — HIGH (ref 70–99)
HCT VFR BLD CALC: 28.7 % — LOW (ref 34.5–45)
HCT VFR BLD CALC: 30.9 % — LOW (ref 34.5–45)
HGB BLD-MCNC: 10 G/DL — LOW (ref 11.5–15.5)
HGB BLD-MCNC: 9.1 G/DL — LOW (ref 11.5–15.5)
MAGNESIUM SERPL-MCNC: 1.8 MG/DL — SIGNIFICANT CHANGE UP (ref 1.6–2.6)
MCHC RBC-ENTMCNC: 30.6 PG — SIGNIFICANT CHANGE UP (ref 27–34)
MCHC RBC-ENTMCNC: 31.6 PG — SIGNIFICANT CHANGE UP (ref 27–34)
MCHC RBC-ENTMCNC: 31.7 GM/DL — LOW (ref 32–36)
MCHC RBC-ENTMCNC: 32.4 GM/DL — SIGNIFICANT CHANGE UP (ref 32–36)
MCV RBC AUTO: 96.6 FL — SIGNIFICANT CHANGE UP (ref 80–100)
MCV RBC AUTO: 97.8 FL — SIGNIFICANT CHANGE UP (ref 80–100)
NRBC # BLD: 0 /100 WBCS — SIGNIFICANT CHANGE UP (ref 0–0)
NRBC # BLD: 0 /100 WBCS — SIGNIFICANT CHANGE UP (ref 0–0)
PHOSPHATE SERPL-MCNC: 3 MG/DL — SIGNIFICANT CHANGE UP (ref 2.5–4.5)
PLATELET # BLD AUTO: 176 K/UL — SIGNIFICANT CHANGE UP (ref 150–400)
PLATELET # BLD AUTO: 197 K/UL — SIGNIFICANT CHANGE UP (ref 150–400)
POTASSIUM SERPL-MCNC: 3.9 MMOL/L — SIGNIFICANT CHANGE UP (ref 3.5–5.3)
POTASSIUM SERPL-SCNC: 3.9 MMOL/L — SIGNIFICANT CHANGE UP (ref 3.5–5.3)
RBC # BLD: 2.97 M/UL — LOW (ref 3.8–5.2)
RBC # BLD: 3.16 M/UL — LOW (ref 3.8–5.2)
RBC # FLD: 16.6 % — HIGH (ref 10.3–14.5)
RBC # FLD: 16.8 % — HIGH (ref 10.3–14.5)
SODIUM SERPL-SCNC: 141 MMOL/L — SIGNIFICANT CHANGE UP (ref 135–145)
SPECIMEN SOURCE: SIGNIFICANT CHANGE UP
WBC # BLD: 7.41 K/UL — SIGNIFICANT CHANGE UP (ref 3.8–10.5)
WBC # BLD: 7.88 K/UL — SIGNIFICANT CHANGE UP (ref 3.8–10.5)
WBC # FLD AUTO: 7.41 K/UL — SIGNIFICANT CHANGE UP (ref 3.8–10.5)
WBC # FLD AUTO: 7.88 K/UL — SIGNIFICANT CHANGE UP (ref 3.8–10.5)

## 2024-02-05 PROCEDURE — 96365 THER/PROPH/DIAG IV INF INIT: CPT

## 2024-02-05 PROCEDURE — 80048 BASIC METABOLIC PNL TOTAL CA: CPT

## 2024-02-05 PROCEDURE — 85027 COMPLETE CBC AUTOMATED: CPT

## 2024-02-05 PROCEDURE — 83935 ASSAY OF URINE OSMOLALITY: CPT

## 2024-02-05 PROCEDURE — 83036 HEMOGLOBIN GLYCOSYLATED A1C: CPT

## 2024-02-05 PROCEDURE — 83690 ASSAY OF LIPASE: CPT

## 2024-02-05 PROCEDURE — 83735 ASSAY OF MAGNESIUM: CPT

## 2024-02-05 PROCEDURE — 83605 ASSAY OF LACTIC ACID: CPT

## 2024-02-05 PROCEDURE — 96375 TX/PRO/DX INJ NEW DRUG ADDON: CPT

## 2024-02-05 PROCEDURE — 87507 IADNA-DNA/RNA PROBE TQ 12-25: CPT

## 2024-02-05 PROCEDURE — 82570 ASSAY OF URINE CREATININE: CPT

## 2024-02-05 PROCEDURE — 84100 ASSAY OF PHOSPHORUS: CPT

## 2024-02-05 PROCEDURE — 81001 URINALYSIS AUTO W/SCOPE: CPT

## 2024-02-05 PROCEDURE — 87177 OVA AND PARASITES SMEARS: CPT

## 2024-02-05 PROCEDURE — 0225U NFCT DS DNA&RNA 21 SARSCOV2: CPT

## 2024-02-05 PROCEDURE — 80076 HEPATIC FUNCTION PANEL: CPT

## 2024-02-05 PROCEDURE — 99239 HOSP IP/OBS DSCHRG MGMT >30: CPT | Mod: GC

## 2024-02-05 PROCEDURE — 36415 COLL VENOUS BLD VENIPUNCTURE: CPT

## 2024-02-05 PROCEDURE — 96361 HYDRATE IV INFUSION ADD-ON: CPT

## 2024-02-05 PROCEDURE — 99285 EMERGENCY DEPT VISIT HI MDM: CPT

## 2024-02-05 PROCEDURE — 84300 ASSAY OF URINE SODIUM: CPT

## 2024-02-05 PROCEDURE — 74176 CT ABD & PELVIS W/O CONTRAST: CPT | Mod: MA

## 2024-02-05 PROCEDURE — 87046 STOOL CULTR AEROBIC BACT EA: CPT

## 2024-02-05 PROCEDURE — 84133 ASSAY OF URINE POTASSIUM: CPT

## 2024-02-05 PROCEDURE — 87045 FECES CULTURE AEROBIC BACT: CPT

## 2024-02-05 PROCEDURE — 84156 ASSAY OF PROTEIN URINE: CPT

## 2024-02-05 PROCEDURE — 85025 COMPLETE CBC W/AUTO DIFF WBC: CPT

## 2024-02-05 RX ORDER — METRONIDAZOLE 500 MG
1 TABLET ORAL
Qty: 12 | Refills: 0
Start: 2024-02-05 | End: 2024-02-08

## 2024-02-05 RX ORDER — ACETAMINOPHEN 500 MG
650 TABLET ORAL EVERY 6 HOURS
Refills: 0 | Status: DISCONTINUED | OUTPATIENT
Start: 2024-02-05 | End: 2024-02-05

## 2024-02-05 RX ORDER — POLYETHYLENE GLYCOL 3350 17 G/17G
17 POWDER, FOR SOLUTION ORAL DAILY
Refills: 0 | Status: DISCONTINUED | OUTPATIENT
Start: 2024-02-05 | End: 2024-02-05

## 2024-02-05 RX ORDER — CIPROFLOXACIN LACTATE 400MG/40ML
1 VIAL (ML) INTRAVENOUS
Qty: 8 | Refills: 0
Start: 2024-02-05 | End: 2024-02-08

## 2024-02-05 RX ORDER — POLYETHYLENE GLYCOL 3350 17 G/17G
17 POWDER, FOR SOLUTION ORAL
Qty: 1 | Refills: 0
Start: 2024-02-05 | End: 2024-03-05

## 2024-02-05 RX ORDER — SENNA PLUS 8.6 MG/1
2 TABLET ORAL
Qty: 60 | Refills: 0
Start: 2024-02-05 | End: 2024-03-05

## 2024-02-05 RX ORDER — SENNA PLUS 8.6 MG/1
2 TABLET ORAL AT BEDTIME
Refills: 0 | Status: DISCONTINUED | OUTPATIENT
Start: 2024-02-05 | End: 2024-02-05

## 2024-02-05 RX ADMIN — Medication 650 MILLIGRAM(S): at 12:42

## 2024-02-05 RX ADMIN — Medication 200 MILLIGRAM(S): at 05:22

## 2024-02-05 RX ADMIN — SODIUM CHLORIDE 75 MILLILITER(S): 9 INJECTION, SOLUTION INTRAVENOUS at 05:31

## 2024-02-05 RX ADMIN — Medication 100 MILLIGRAM(S): at 05:21

## 2024-02-05 RX ADMIN — ISOSORBIDE MONONITRATE 30 MILLIGRAM(S): 60 TABLET, EXTENDED RELEASE ORAL at 12:43

## 2024-02-05 RX ADMIN — HEPARIN SODIUM 5000 UNIT(S): 5000 INJECTION INTRAVENOUS; SUBCUTANEOUS at 05:20

## 2024-02-05 RX ADMIN — Medication 10 MILLIGRAM(S): at 12:42

## 2024-02-05 RX ADMIN — Medication 50 MILLIGRAM(S): at 12:43

## 2024-02-05 RX ADMIN — Medication 10 MILLIGRAM(S): at 05:21

## 2024-02-05 RX ADMIN — Medication 240 MILLIGRAM(S): at 05:21

## 2024-02-05 RX ADMIN — Medication 10 MILLIGRAM(S): at 12:44

## 2024-02-05 RX ADMIN — POLYETHYLENE GLYCOL 3350 17 GRAM(S): 17 POWDER, FOR SOLUTION ORAL at 12:42

## 2024-02-05 NOTE — DISCHARGE NOTE PROVIDER - HOSPITAL COURSE
77F, from home, ambulates with cane, with PMhx HTN, T2DM, CAD, pulmonary fibrosis, depression who presented with 1 week duration of lower abdominal pain. Associated with nausea and 3-4 episodes of nonbloody diarrhea per day. Abdominal pain described as churning, comes and goes. Worse after food. No new medications or foods. No recent travel or laxative use. No recent abx or PPI use. Never happened before. For past 3 days, took Tylenol every 4 hours with minimal improvement of abd pain. In the ED: vital signs stable, BMP notable for DENIS on CKD with sCr of 2.2. CT abdomen showed acute uncomplicated sigmoid diverticulitis. Cholelithiasis. Nonobstructive right nephrolithiasis. Admitted to medicine for acute diverticulitis. Continued on home medications with the exception of Farxiga and Ofev. Farxiga held in the setting of DENIS. Continued on sliding scale insulin. Started on Cipro 400mg IV q12 and Flagyl 500mg IV q8. Received 1L NS bolus in the ED and continued on LR 75cc/hr for DENIS on CKD. Serum creatinine improved to 1.5 at time of discharge. GI PCR and stool culture were negative. GI Dr. Serna consulted who recommended outpatient colonoscopy in 6-8 weeks. Patient improved clinically with no further episodes of diarrhea over a 48hr period.     Patient is stable for discharge and is advised to follow up with PCP as outpatient.  Please refer to patient's complete medical chart with documents for a full hospital course, for this is only a brief summary.

## 2024-02-05 NOTE — PROGRESS NOTE ADULT - NEGATIVE NEUROLOGICAL SYMPTOMS
no focal seizures/no syncope/no tremors/no vertigo/no loss of sensation/no headache
no focal seizures/no syncope/no tremors/no vertigo/no loss of sensation/no headache

## 2024-02-05 NOTE — DISCHARGE NOTE PROVIDER - PROVIDER TOKENS
PROVIDER:[TOKEN:[[id number]],FOLLOWUP:[1 month]] PROVIDER:[TOKEN:[[id number]],FOLLOWUP:[1 month]],PROVIDER:[TOKEN:[[id number]],FOLLOWUP:[1 week],ESTABLISHEDPATIENT:[T]]

## 2024-02-05 NOTE — DISCHARGE NOTE NURSING/CASE MANAGEMENT/SOCIAL WORK - NURSING SECTION COMPLETE
Problem: Altered physiologic condition related to immediate post-delivery state and potential for bleeding/hemorrhage  Goal: Patient physiologically stable as evidenced by normal lochia, palpable uterine involution and vital signs within normal limits  Outcome: PROGRESSING AS EXPECTED  Fundus firm @ U, lochia rubra minimal. V/S within defined limits.     Problem: Alteration in comfort related to episiotomy, vaginal repair and/or after birth pains  Goal: Patient verbalizes acceptable pain level  Outcome: PROGRESSING AS EXPECTED  Patient verbalized acceptable pain level @ this time. Will be medicated as needed.        Patient/Caregiver provided printed discharge information.

## 2024-02-05 NOTE — PROGRESS NOTE ADULT - SUBJECTIVE AND OBJECTIVE BOX
Patient is a 78y old  Female who presents with a chief complaint of abdominal pain (04 Feb 2024 13:51)    Patient was seen and examined at bedside   Reports had two loose BM and mild abd pain    INTERVAL HPI/OVERNIGHT EVENTS:  T(C): 36.6 (02-04-24 @ 14:45), Max: 36.9 (02-03-24 @ 20:36)  HR: 76 (02-04-24 @ 14:45) (75 - 82)  BP: 154/70 (02-04-24 @ 14:45) (113/62 - 158/77)  RR: 18 (02-04-24 @ 14:45) (18 - 18)  SpO2: 98% (02-04-24 @ 14:45) (95% - 98%)  Wt(kg): --  I&O's Summary      REVIEW OF SYSTEMS: denies fever, chills, SOB, palpitations, chest pain,  nausea, vomitting, constipation, dizziness    MEDICATIONS  (STANDING):  allopurinol 100 milliGRAM(s) Oral two times a day  atorvastatin 40 milliGRAM(s) Oral at bedtime  busPIRone 10 milliGRAM(s) Oral three times a day  ciprofloxacin   IVPB 400 milliGRAM(s) IV Intermittent every 12 hours  diltiazem    milliGRAM(s) Oral daily  heparin   Injectable 5000 Unit(s) SubCutaneous every 8 hours  hydrALAZINE 10 milliGRAM(s) Oral three times a day  isosorbide   mononitrate ER Tablet (IMDUR) 30 milliGRAM(s) Oral daily  lactated ringers. 1000 milliLiter(s) (75 mL/Hr) IV Continuous <Continuous>  metroNIDAZOLE  IVPB 500 milliGRAM(s) IV Intermittent every 8 hours  traZODone 50 milliGRAM(s) Oral daily    MEDICATIONS  (PRN):      PHYSICAL EXAM:  GENERAL: NAD  NERVOUS SYSTEM:  Alert & Oriented X3, Good concentration;  no focal deficit   CHEST/LUNG: Clear to auscultation  bilaterally; No rales, rhonchi, wheezing, or rubs  HEART: Regular rate and rhythm; No murmurs, rubs, or gallops  ABDOMEN: Soft, mild lower abd tenderness, Nondistended; Bowel sounds present  EXTREMITIES:  2+ Peripheral Pulses, No clubbing, cyanosis, or edema  SKIN: No rashes or lesions      LABS:                        10.0   7.57  )-----------( 202      ( 04 Feb 2024 06:22 )             31.2     02-04    141  |  108  |  10  ----------------------------<  103<H>  3.9   |  27  |  1.52<H>    Ca    8.7      04 Feb 2024 06:22  Phos  2.8     02-04  Mg     2.0     02-04        Urinalysis Basic - ( 04 Feb 2024 06:22 )    Color: x / Appearance: x / SG: x / pH: x  Gluc: 103 mg/dL / Ketone: x  / Bili: x / Urobili: x   Blood: x / Protein: x / Nitrite: x   Leuk Esterase: x / RBC: x / WBC x   Sq Epi: x / Non Sq Epi: x / Bacteria: x      CAPILLARY BLOOD GLUCOSE        
[   ] ICU                                          [   ] CCU                                      [ X  ] Medical Floor    Patient is a 78 year old female with abdominal pain. GI consulted to evaluate.         78 year old female from home, ambulates with cane, with past medical history significant for HTN, T2DM, CAD, pulmonary fibrosis, and depression who presented to the emergency room with one week history of progressively worsening 8/10 intensity non radiating crampy LLQ abdominal pain associated with nausea, non bloody vomiting and diarrhea. patient denies hematemesis, hematochezia, melena, fever, chills, chest pain, SOB, hematuria, dysuria, recent antibiotic intake or travelling.      Patient appears comfortable. No new complaints reported, No abdominal pain, N/V, hematemesis, hematochezia, melena, fever, chills, chest pain, SOB, cough or diarrhea reported.      PAIN MANAGEMENT:  Pain Scale:                8 /10  Pain Location:         PAST MEDICAL HISTORY    CAD (Coronary Artery Disease)    Coronary Stent    HTN (Hypertension)    Diabetes Mellitus Type II    Diabetic Neuropathy    Diabetic Nephropathy    Hypercholesterolemia    OA (Osteoarthritis) of Knee    GERD (Gastroesophageal Reflux Disease)    Anemia    Pudendal Ulcer    Depression    PUD (Peptic Ulcer Disease)    Heart Attack    Renal insufficiency    Sleep apnea, unspecified type        PAST SURGICAL HISTORY    Hip replacement    Bowel obstruction    Stented coronary artery    Cataract extraction        Allergies    penicillin (Swelling)    Intolerances  None          SOCIAL HISTORY  Advanced Directives:       [ X ] Full Code       [  ] DNR  Marital Status:         [  ] M      [ X ] S      [  ] D       [  ] W  Children:       [X  ] Yes      [  ] No  Occupation:        [  ] Employed       [ X ] Unemployed       [  ] Retired  Diet:       [X  ] Regular       [  ] PEG feeding          [  ] NG tube feeding  Drug Use:           [ X ] Patient denied          [  ] Yes  Alcohol:           [ X ] No             [  ] Yes (socially)         [  ] Yes (chronic)  Tobacco:           [  ] Yes           [ X ] No      FAMILY HISTORY  [ X ] Heart Disease            [ X ] Diabetes             [ X ] HTN             [  ] Colon Cancer             [  ] Stomach Cancer              [  ] Pancreatic Cancer          VITALS   Vital Signs Last 24 Hrs  T(C): 37.1 (02-05-24 @ 05:51), Max: 37.4 (02-04-24 @ 21:39)  T(F): 98.8 (02-05-24 @ 05:51), Max: 99.3 (02-04-24 @ 21:39)  HR: 78 (02-05-24 @ 05:51) (74 - 78)  BP: 124/63 (02-05-24 @ 05:51) (124/63 - 154/70)  BP(mean): 82 (02-04-24 @ 21:39) (82 - 82)  RR: 17 (02-05-24 @ 05:51) (17 - 18)  SpO2: 94% (02-05-24 @ 05:51) (94% - 98%)        MEDICATIONS  (STANDING):  allopurinol 100 milliGRAM(s) Oral two times a day  atorvastatin 40 milliGRAM(s) Oral at bedtime  busPIRone 10 milliGRAM(s) Oral three times a day  ciprofloxacin   IVPB 400 milliGRAM(s) IV Intermittent every 12 hours  diltiazem    milliGRAM(s) Oral daily  heparin   Injectable 5000 Unit(s) SubCutaneous every 8 hours  hydrALAZINE 10 milliGRAM(s) Oral three times a day  isosorbide   mononitrate ER Tablet (IMDUR) 30 milliGRAM(s) Oral daily  lactated ringers. 1000 milliLiter(s) (75 mL/Hr) IV Continuous <Continuous>  metroNIDAZOLE  IVPB 500 milliGRAM(s) IV Intermittent every 8 hours  polyethylene glycol 3350 17 Gram(s) Oral daily  senna 2 Tablet(s) Oral at bedtime  traZODone 50 milliGRAM(s) Oral daily    MEDICATIONS  (PRN):  acetaminophen     Tablet .. 650 milliGRAM(s) Oral every 6 hours PRN Temp greater or equal to 38C (100.4F), Mild Pain (1 - 3)                            9.1    7.41  )-----------( 176      ( 05 Feb 2024 06:50 )             28.7       02-05    141  |  108  |  10  ----------------------------<  131<H>  3.9   |  26  |  1.50<H>    Ca    8.6      05 Feb 2024 06:50  Phos  3.0     02-05  Mg     1.8     02-05        
PGY-1 Progress Note discussed with attending.    PAGER #: [[phone]] TILL 5:00 PM  PLEASE CONTACT ON CALL TEAM:  - On Call Team (Please refer to Poli) FROM 5:00 PM - 8:30PM  - Nightfloat Team FROM 8:30 -7:30 AM      INTERVAL HPI/OVERNIGHT EVENTS: Patient seen and examined at bedside. Resting comfortably. No acute overnight events. 1 episode loose stool overnight. None this morning. Abd pain improving.       REVIEW OF SYSTEMS:  CONSTITUTIONAL: No fever, weight loss, or fatigue.  RESPIRATORY: No cough, wheezing, chills, or hemoptysis. No shortness of breath.  CARDIOVASCULAR: No chest pain, palpitations, dizziness, or leg swelling.  GASTROINTESTINAL: +abdominal pain. No nausea, vomiting, or hematemesis. No diarrhea or constipation. No melena or hematochezia.  GENITOURINARY: No dysuria or hematuria, urinary frequency.  NEUROLOGICAL: No headaches, memory loss, loss of strength, numbness, or tremors.  SKIN: No itching, burning, rashes, or lesions.    Vital Signs Last 24 Hrs  T(C): 36.9 (03 Feb 2024 05:17), Max: 37.3 (02 Feb 2024 20:06)  T(F): 98.4 (03 Feb 2024 05:17), Max: 99.2 (02 Feb 2024 20:06)  HR: 77 (03 Feb 2024 05:17) (76 - 85)  BP: 147/74 (03 Feb 2024 05:17) (122/63 - 159/82)  BP(mean): --  RR: 18 (03 Feb 2024 05:17) (17 - 18)  SpO2: 99% (03 Feb 2024 05:17) (96% - 99%)    Parameters below as of 03 Feb 2024 05:17  Patient On (Oxygen Delivery Method): room air        PHYSICAL EXAMINATION:  GENERAL: NAD   HEAD:  Atraumatic, Normocephalic.  EYES:  Conjunctiva and sclera clear.  NECK: Supple, No JVD, Normal thyroid.  CHEST/LUNG: Clear to auscultation. Clear to percussion bilaterally. No rales, rhonchi, wheezing, or rubs.  HEART: Regular rate and rhythm. No murmurs, rubs, or gallops.  ABDOMEN: Soft, Nontender, Nondistended. Bowel sounds present.  NERVOUS SYSTEM:  Alert & Oriented X3  EXTREMITIES:  2+ Peripheral Pulses. No clubbing, cyanosis, or edema.  SKIN: Warm, dry.                          11.4   6.91  )-----------( 201      ( 03 Feb 2024 07:48 )             35.9     02-03    140  |  107  |  13  ----------------------------<  136<H>  3.7   |  26  |  1.72<H>    Ca    9.0      03 Feb 2024 07:48  Phos  3.1     02-03  Mg     2.1     02-03    TPro  8.1  /  Alb  3.6  /  TBili  0.5  /  DBili  <0.1  /  AST  34  /  ALT  31  /  AlkPhos  54  02-02    LIVER FUNCTIONS - ( 02 Feb 2024 13:05 )  Alb: 3.6 g/dL / Pro: 8.1 g/dL / ALK PHOS: 54 U/L / ALT: 31 U/L DA / AST: 34 U/L / GGT: x                   CAPILLARY BLOOD GLUCOSE:      RADIOLOGY & ADDITIONAL TESTS:              
[   ] ICU                                          [   ] CCU                                      [ X  ] Medical Floor    Patient is a 78 year old female with abdominal pain. GI consulted to evaluate.         78 year old female from home, ambulates with cane, with past medical history significant for HTN, T2DM, CAD, pulmonary fibrosis, and depression who presented to the emergency room with one week history of progressively worsening 8/10 intensity non radiating crampy LLQ abdominal pain associated with nausea, non bloody vomiting and diarrhea. patient denies hematemesis, hematochezia, melena, fever, chills, chest pain, SOB, hematuria, dysuria, recent antibiotic intake or travelling.      Patient appears comfortable. No new complaints reported, No abdominal pain, N/V, hematemesis, hematochezia, melena, fever, chills, chest pain, SOB, cough or diarrhea reported.      PAIN MANAGEMENT:  Pain Scale:                8 /10  Pain Location:         PAST MEDICAL HISTORY    CAD (Coronary Artery Disease)    Coronary Stent    HTN (Hypertension)    Diabetes Mellitus Type II    Diabetic Neuropathy    Diabetic Nephropathy    Hypercholesterolemia    OA (Osteoarthritis) of Knee    GERD (Gastroesophageal Reflux Disease)    Anemia    Pudendal Ulcer    Depression    PUD (Peptic Ulcer Disease)    Heart Attack    Renal insufficiency    Sleep apnea, unspecified type        PAST SURGICAL HISTORY    Hip replacement    Bowel obstruction    Stented coronary artery    Cataract extraction        Allergies    penicillin (Swelling)    Intolerances  None          SOCIAL HISTORY  Advanced Directives:       [ X ] Full Code       [  ] DNR  Marital Status:         [  ] M      [ X ] S      [  ] D       [  ] W  Children:       [X  ] Yes      [  ] No  Occupation:        [  ] Employed       [ X ] Unemployed       [  ] Retired  Diet:       [X  ] Regular       [  ] PEG feeding          [  ] NG tube feeding  Drug Use:           [ X ] Patient denied          [  ] Yes  Alcohol:           [ X ] No             [  ] Yes (socially)         [  ] Yes (chronic)  Tobacco:           [  ] Yes           [ X ] No      FAMILY HISTORY  [ X ] Heart Disease            [ X ] Diabetes             [ X ] HTN             [  ] Colon Cancer             [  ] Stomach Cancer              [  ] Pancreatic Cancer        VITALS   Vital Signs Last 24 Hrs  T(C): 36.3 (02-04-24 @ 04:47), Max: 37.4 (02-03-24 @ 14:32)  T(F): 97.3 (02-04-24 @ 04:47), Max: 99.3 (02-03-24 @ 14:32)  HR: 82 (02-04-24 @ 04:47) (75 - 82)  BP: 158/77 (02-04-24 @ 04:47) (113/62 - 158/77)  BP(mean): 78 (02-03-24 @ 20:36) (78 - 78)  RR: 18 (02-04-24 @ 04:47) (17 - 18)  SpO2: 97% (02-04-24 @ 04:47) (95% - 97%)      MEDICATIONS  (STANDING):  allopurinol 100 milliGRAM(s) Oral two times a day  atorvastatin 40 milliGRAM(s) Oral at bedtime  busPIRone 10 milliGRAM(s) Oral three times a day  ciprofloxacin   IVPB 400 milliGRAM(s) IV Intermittent every 12 hours  diltiazem    milliGRAM(s) Oral daily  heparin   Injectable 5000 Unit(s) SubCutaneous every 8 hours  hydrALAZINE 10 milliGRAM(s) Oral three times a day  isosorbide   mononitrate ER Tablet (IMDUR) 30 milliGRAM(s) Oral daily  lactated ringers. 1000 milliLiter(s) (75 mL/Hr) IV Continuous <Continuous>  metroNIDAZOLE  IVPB 500 milliGRAM(s) IV Intermittent every 8 hours  traZODone 50 milliGRAM(s) Oral daily    MEDICATIONS  (PRN):                            10.0   7.57  )-----------( 202      ( 04 Feb 2024 06:22 )             31.2       02-04    141  |  108  |  10  ----------------------------<  103<H>  3.9   |  27  |  1.52<H>    Ca    8.7      04 Feb 2024 06:22  Phos  2.8     02-04  Mg     2.0     02-04

## 2024-02-05 NOTE — DISCHARGE NOTE PROVIDER - CARE PROVIDER_API CALL
James Serna  Gastroenterology  [address] [place] [address], [address]  [place] [address]  Phone: [phone]  Fax: [phone]  Follow Up Time: 1 month   James Serna  Gastroenterology  [address] [place] [address], [address]  [place] [address]  Phone: [phone]  Fax: [phone]  Follow Up Time: 1 month    Denice Gaspar  Internal Medicine  [address] [place] [id number]  Phone: [phone]  Fax: [phone]  Established Patient  Follow Up Time: 1 week

## 2024-02-05 NOTE — PROGRESS NOTE ADULT - NEGATIVE GASTROINTESTINAL SYMPTOMS
no change in bowel habits/no melena/no hematochezia/no steatorrhea/no jaundice/no hiccoughs
no change in bowel habits/no melena/no hematochezia/no steatorrhea/no jaundice/no hiccoughs

## 2024-02-05 NOTE — DISCHARGE NOTE PROVIDER - NSDCMRMEDTOKEN_GEN_ALL_CORE_FT
allopurinol 100 mg oral tablet: orally 2 times a day  atorvastatin 40 mg oral tablet: 1 tab(s) orally once a day (at bedtime)  busPIRone 15 mg oral tablet: 1 tab(s) orally 3 times a day  Cipro 500 mg oral tablet: 1 tab(s) orally 2 times a day  DilTIAZem (Eqv-Dilacor XR) 240 mg/24 hours oral capsule, extended release: 1 cap(s) orally once a day  Farxiga 5 mg oral tablet: 1 tab(s) orally once a day  hydrALAZINE 10 mg oral tablet: 1 tab(s) orally 3 times a day  isosorbide mononitrate 30 mg oral tablet, extended release: 1 tab(s) orally once a day  metroNIDAZOLE 500 mg oral tablet: 1 tab(s) orally 3 times a day  Ofev 150 mg oral capsule: 1 cap(s) orally twice a day after breakfast and lunch  polyethylene glycol 3350 oral powder for reconstitution: 17 gram(s) orally once a day as needed for  constipation  senna leaf extract oral tablet: 2 tab(s) orally once a day (at bedtime) as needed for  constipation  traZODone 50 mg oral tablet: 1 tab(s) orally once a day

## 2024-02-05 NOTE — DISCHARGE NOTE NURSING/CASE MANAGEMENT/SOCIAL WORK - PATIENT PORTAL LINK FT
You can access the FollowMyHealth Patient Portal offered by [facility] by registering at the following website: [email or web address]. By joining [facility]’s FollowMyHealth portal, you will also be able to view your health information using other applications (apps) compatible with our system.

## 2024-02-05 NOTE — PROGRESS NOTE ADULT - NEGATIVE RESPIRATORY AND THORAX SYMPTOMS
no wheezing/no dyspnea/no cough/no hemoptysis/no pleuritic chest pain
no wheezing/no dyspnea/no cough/no hemoptysis/no pleuritic chest pain

## 2024-02-05 NOTE — PROGRESS NOTE ADULT - ASSESSMENT
Acute sigmoid diverticulitis   DENIS  Anemia  Non obstructive nephrolithiasis  HTN  HLD  DM  Pulm fibrosis   CAD    Plan:   Cont Cipro and flagyl, cont IVF; stool studies neg so far  Renal function improving after IVF  Hb dropped but patient denies any black stool, will monitor for now  Cont home meds for BP with holding parameters  GI f/u as outpatient for colonoscopy  ISS   Cont home meds for CAD, HLD  Possible dc tomorrow         
77F, from home, ambulates with cane, with PMhx HTN, T2DM, CAD, CKD, pulmonary fibrosis, depression who presents with 1 week duration of lower abdominal pain. Associated with nausea and 3-4 episodes of nonbloody diarrhea per day. VSS, no leukocytosis. Found to have DENIS, and CT abd pelvis showing Pericolonic stranding associated with some proximal sigmoid diverticula consistent with acute diverticulitis. Admitted for acute diverticulitis and DENIS on CKD.  
1. Abdominal pain  2. Acute sigmoid diverticulitis  3. Colitis less likely  4. Anemia  5. No evidence of acute GI bleeding  6. Cholelithiasis (asymptomatic)    Suggestions:    1. Continue antibiotics  2. Monitor electrolytes  3. Protonix daily  4. Avoid NSAID  5. Monitor H/H  6. Transfuse PRBC as needed  7. Diet as tolerated  8. Colonoscopy in 6-8 weeks out patient  9. DVT prophylaxis 
1. Abdominal pain  2. Acute sigmoid diverticulitis  3. Colitis less likely  4. Anemia(drop in H/H)  5. No evidence of acute GI bleeding  6. Cholelithiasis (asymptomatic)    Suggestions:    1. Continue antibiotics  2. Monitor electrolytes  3. Protonix daily  4. Avoid NSAID  5. Monitor H/H  6. Transfuse PRBC as needed  7. Diet as tolerated  8. Colonoscopy in 6-8 weeks out patient  9. DVT prophylaxis

## 2024-02-05 NOTE — PROGRESS NOTE ADULT - RESPIRATORY
clear to auscultation bilaterally/no wheezes/no rales/no rhonchi/no respiratory distress/no use of accessory muscles/no subcutaneous emphysema/breath sounds equal/good air movement/respirations non-labored
clear to auscultation bilaterally/no wheezes/no rales/no rhonchi/no respiratory distress/no use of accessory muscles/no subcutaneous emphysema/breath sounds equal/good air movement/respirations non-labored

## 2024-02-05 NOTE — DISCHARGE NOTE PROVIDER - ATTENDING DISCHARGE PHYSICAL EXAMINATION:
Patient was seen and examined at bedside   Denies any new complains     ICU Vital Signs Last 24 Hrs  T(C): 36.8 (05 Feb 2024 14:30), Max: 37.4 (04 Feb 2024 21:39)  T(F): 98.3 (05 Feb 2024 14:30), Max: 99.3 (04 Feb 2024 21:39)  HR: 83 (05 Feb 2024 14:30) (74 - 83)  BP: 113/69 (05 Feb 2024 14:30) (113/69 - 125/64)  BP(mean): 82 (04 Feb 2024 21:39) (82 - 82)  ABP: --  ABP(mean): --  RR: 18 (05 Feb 2024 14:30) (17 - 18)  SpO2: 97% (05 Feb 2024 14:30) (94% - 97%)    O2 Parameters below as of 05 Feb 2024 14:30  Patient On (Oxygen Delivery Method): room air    P/E:  GENERAL: NAD  NERVOUS SYSTEM:  Alert & Oriented X3, Good concentration;  no focal deficit   CHEST/LUNG: Clear to auscultation  bilaterally; No rales, rhonchi, wheezing, or rubs  HEART: Regular rate and rhythm; No murmurs, rubs, or gallops  ABDOMEN: Soft, no abd tenderness, Nondistended; Bowel sounds present  EXTREMITIES:  2+ Peripheral Pulses, No clubbing, cyanosis, or edema  SKIN: No rashes or lesions    labs noted     A/P:  Cont Cipro and flagyl on dc to complete total 7days of dc; stool studies neg so far  Renal function improved  Hb dropped but stable on repeat CBC. Will followup with GI as out patient for outpatient colonoscopy  Cont home meds for BP with holding parameters  Cont home meds for DM, GFR noted   Cont home meds for CAD, HLD

## 2024-02-05 NOTE — PROGRESS NOTE ADULT - NEGATIVE ENMT SYMPTOMS
no hearing difficulty/no ear pain/no tinnitus/no vertigo/no sinus symptoms/no nasal congestion/no nose bleeds/no gum bleeding/no dry mouth/no throat pain/no dysphagia
no hearing difficulty/no ear pain/no tinnitus/no vertigo/no sinus symptoms/no nasal congestion/no nose bleeds/no gum bleeding/no dry mouth/no throat pain/no dysphagia

## 2024-02-05 NOTE — PROGRESS NOTE ADULT - NEGATIVE GENERAL GENITOURINARY SYMPTOMS
no hematuria/no renal colic/no flank pain L/no flank pain R/no incontinence/no dysuria
no hematuria/no renal colic/no flank pain L/no flank pain R/no incontinence/no dysuria

## 2024-02-05 NOTE — DISCHARGE NOTE PROVIDER - NSDCCPCAREPLAN_GEN_ALL_CORE_FT
PRINCIPAL DISCHARGE DIAGNOSIS  Diagnosis: Acute diverticulitis  Assessment and Plan of Treatment: You came to the hospital concerned for abdominal pain and diarrhea. A CT scan of your abdomen showed acute diverticulitis of the sigmoid colon. Diverticulitis occurs when pouches form in the wall of the colon and become inflamed or infected. You were treated with IV antibiotics while in the hospital. PLEASE CONTINUE TO TAKE CIPROFLOXACIN 500MG TWICE A DAY and FLAGYL 500MG THREE TIMES A DAY FOR 4 DAYS to complete a 7 day course. Gastroenterology was consulted and recommended that you schedule an appointment to have a  COLONOSCOPY IN 6-8 WEEKS. Please follow up with your primary care doctor in a week from discharge to adjust medications as needed and discuss further management.        SECONDARY DISCHARGE DIAGNOSES  Diagnosis: Acute kidney injury superimposed on CKD  Assessment and Plan of Treatment:     Diagnosis: Cholelithiasis  Assessment and Plan of Treatment:     Diagnosis: T2DM (type 2 diabetes mellitus)  Assessment and Plan of Treatment:     Diagnosis: HTN (hypertension)  Assessment and Plan of Treatment:     Diagnosis: HLD (hyperlipidemia)  Assessment and Plan of Treatment:     Diagnosis: Depression  Assessment and Plan of Treatment:      PRINCIPAL DISCHARGE DIAGNOSIS  Diagnosis: Acute diverticulitis  Assessment and Plan of Treatment: You came to the hospital concerned for abdominal pain and diarrhea. A CT scan of your abdomen showed acute diverticulitis of the sigmoid colon. Diverticulitis occurs when pouches form in the wall of the colon and become inflamed or infected. You were treated with IV antibiotics while in the hospital. PLEASE CONTINUE TO TAKE CIPROFLOXACIN 500MG TWICE A DAY and FLAGYL 500MG THREE TIMES A DAY FOR 4 DAYS to complete a 7 day course. Gastroenterology was consulted and recommended that you schedule an appointment to have a  COLONOSCOPY IN 6-8 WEEKS. Please follow up with your primary care doctor in a week from discharge to adjust medications as needed and discuss further management.        SECONDARY DISCHARGE DIAGNOSES  Diagnosis: Acute kidney injury superimposed on CKD  Assessment and Plan of Treatment: You were diagnosed with acute kidney injury superimposed on known chronic kidney disease. DENIS (acute kidney injury) is a condition in which the kidneys suddenly can't filter waste from the blood. Symptoms include decreased urinary output, swelling due to fluid retention, nausea, fatigue, and shortness of breath. Sometimes symptoms may be subtle or may not appear at all. A marker of your kidney function is your serum creatinine which elevates when the kidney gets injured. Your creatinine was elevated and then improved down to baseline. You were treated with IV fluids until it normalized. Your last serum creatinine level was 1.5. Please continue to take your medications as PRESCRIBED and follow up with your primary care doctor in a week from discharge to adjust medications as needed and discuss further management.      Diagnosis: Pulmonary fibrosis  Assessment and Plan of Treatment: You have a history of pulmonary fibrosis, a form of interstitial lung disease. It happens because of damage between the air sacs in the lung. The damage scars the lung and causes breathing problems. Upon discharge please resume taking your home medication OFEV as prescribed. Please follow up with your primary care doctor or pulmonologist in a week from discharge to adjust medications as needed and discuss further management.      Diagnosis: Cholelithiasis  Assessment and Plan of Treatment: A CT scan of your abdomen which diagnosed acute diverticulitis incidentally showed a stone in the gall bladder, known as cholelithiasis. Gallstones form when cholesterol and other substances found in bile make stones. They can also form if the gallbladder doesn't empty as it should. People who are overweight or who are trying to lose weight quickly are more likely to get gallstones. Most people who have gallstones don't have symptoms. Symptoms usually occur if the stone is obstructing or infected. Your CT scan showed no signs of obstruction or inflammation.  Talk to your doctor if you develop right upper quadrant abdominal pain associated with fever or yellowining of the skin and eyes. Please continue to take your medications as PRESCRIBED and follow up with your primary care doctor in a week from discharge to adjust medications as needed and discuss further management.      Diagnosis: T2DM (type 2 diabetes mellitus)  Assessment and Plan of Treatment: You have a history of diabetes.   Your HbA1c was 6.6 during this admission.  You need to continue monitoring your blood sugar levels closely and maintain healthy lifestyle by eating healthy diabetic regimen, weight loss and exercise regularly as tolerated.  Please continue to take your home medications as prescribed.   Please follow up with your PCP/Endocrinologist within a week of discharge.      Diagnosis: HTN (hypertension)  Assessment and Plan of Treatment: You have a history of Hypertension. .   On this admission, your Blood Pressure was adequately controlled with your home medications.   Your blood pressure target is 120-140/80-90. To care for your blood pressure at home maintain a healthy lifestyle, eat a low salt diet, avoid fatty food, try to lose weight, and exercise regularly or stay active as tolerated 30 mins X 3 times per week.  Notify your doctor if you have any of the following symptoms:   (Dizziness, Lightheadedness, Blurry vision, Headache, Chest pain, Shortness of breath.)  Please continue taking your home medications and follow-up with your PCP in 1 week from discharge to adjust medications as needed.

## 2024-02-05 NOTE — PROGRESS NOTE ADULT - NEGATIVE CARDIOVASCULAR SYMPTOMS
no chest pain/no palpitations/no orthopnea/no peripheral edema
no chest pain/no palpitations/no orthopnea/no peripheral edema

## 2024-02-05 NOTE — PROGRESS NOTE ADULT - NEGATIVE GENERAL SYMPTOMS
no fever/no chills/no sweating/no anorexia/no polyphagia/no polyuria/no polydipsia
no fever/no chills/no sweating/no anorexia/no polyphagia/no polyuria/no polydipsia

## 2024-02-05 NOTE — DISCHARGE NOTE PROVIDER - NSDCCAREPROVSEEN_GEN_ALL_CORE_FT
Buffy, Anu Badillo, Vishal Cabrera, Wendy Blue, Alyssa Serna, James Rodriguez, Keyana Leon, Vijaya Gutiérrez, Jose Alfredo

## 2024-02-06 LAB
CULTURE RESULTS: SIGNIFICANT CHANGE UP
SPECIMEN SOURCE: SIGNIFICANT CHANGE UP

## 2024-08-01 ENCOUNTER — OUTPATIENT (OUTPATIENT)
Dept: OUTPATIENT SERVICES | Facility: HOSPITAL | Age: 79
LOS: 1 days | End: 2024-08-01
Payer: MEDICARE

## 2024-08-01 ENCOUNTER — RESULT REVIEW (OUTPATIENT)
Age: 79
End: 2024-08-01

## 2024-08-01 DIAGNOSIS — R07.89 OTHER CHEST PAIN: ICD-10-CM

## 2024-08-01 PROCEDURE — 93017 CV STRESS TEST TRACING ONLY: CPT

## 2024-08-01 PROCEDURE — A9502: CPT

## 2024-08-01 PROCEDURE — 93016 CV STRESS TEST SUPVJ ONLY: CPT | Mod: MC

## 2024-08-01 PROCEDURE — 78452 HT MUSCLE IMAGE SPECT MULT: CPT | Mod: MC

## 2024-08-01 PROCEDURE — 78452 HT MUSCLE IMAGE SPECT MULT: CPT | Mod: 26,MC

## 2024-08-01 PROCEDURE — 93018 CV STRESS TEST I&R ONLY: CPT | Mod: MC

## 2024-08-06 NOTE — PATIENT PROFILE ADULT - NSPROPTRIGHTCAREGIVER_GEN_A_NUR
----- Message from Ann Massey sent at 8/6/2024 12:24 PM EDT -----  Regarding: [facility] Appointment Request  [facility] Appointment Request    Patient needs appointment for [facility] Appointment Type: Annual Visit. Physical.    Patient Requested Dates(s): September 23 or After September 13.  Patient Requested Time: Any time.  Provider Name: Samir Santamaria MD      Reason for Appointment Request: Established Patient - Available appointments did not meet patient need. The patient already have an appointment on September 20, but needed to reschedule because the patient will be out of town, but there is no availability of appointment upon re-scheduling.  --------------------------------------------------------------------------------------------------------------------------    Relationship to Patient: Self     Call Back Information: OK to leave message on voicemail  Preferred Call Back Number: Phone:  [phone]               
declines

## 2025-06-09 NOTE — CONSULT NOTE ADULT - PROBLEM SELECTOR RECOMMENDATION 5
English
- Hb dropped 13-->10, no signs of bleeding likely patient was hemoconcentrated and responded to iv fluids. Baseline is 10-11.   - please send occult feces   - anemia panel in am   - repeat CBC for bleeding episodes or abnormal vital signs